# Patient Record
Sex: FEMALE | Race: ASIAN | NOT HISPANIC OR LATINO | Employment: FULL TIME | URBAN - METROPOLITAN AREA
[De-identification: names, ages, dates, MRNs, and addresses within clinical notes are randomized per-mention and may not be internally consistent; named-entity substitution may affect disease eponyms.]

---

## 2017-08-01 LAB — PAP SMEAR - HIM PATIENT REPORTED: NEGATIVE

## 2017-08-22 ENCOUNTER — TRANSFERRED RECORDS (OUTPATIENT)
Dept: HEALTH INFORMATION MANAGEMENT | Facility: CLINIC | Age: 42
End: 2017-08-22

## 2017-08-28 ENCOUNTER — TRANSFERRED RECORDS (OUTPATIENT)
Dept: HEALTH INFORMATION MANAGEMENT | Facility: CLINIC | Age: 42
End: 2017-08-28

## 2018-04-26 ENCOUNTER — TRANSFERRED RECORDS (OUTPATIENT)
Dept: HEALTH INFORMATION MANAGEMENT | Facility: CLINIC | Age: 43
End: 2018-04-26

## 2019-07-31 ENCOUNTER — OFFICE VISIT (OUTPATIENT)
Dept: FAMILY MEDICINE | Facility: CLINIC | Age: 44
End: 2019-07-31
Payer: COMMERCIAL

## 2019-07-31 VITALS
RESPIRATION RATE: 14 BRPM | BODY MASS INDEX: 20.59 KG/M2 | OXYGEN SATURATION: 100 % | TEMPERATURE: 97.4 F | HEIGHT: 64 IN | DIASTOLIC BLOOD PRESSURE: 90 MMHG | SYSTOLIC BLOOD PRESSURE: 158 MMHG | WEIGHT: 120.6 LBS | HEART RATE: 87 BPM

## 2019-07-31 DIAGNOSIS — N83.202 CYST OF LEFT OVARY: ICD-10-CM

## 2019-07-31 DIAGNOSIS — Z00.00 ROUTINE GENERAL MEDICAL EXAMINATION AT A HEALTH CARE FACILITY: Primary | ICD-10-CM

## 2019-07-31 DIAGNOSIS — B35.1 ONYCHOMYCOSIS: ICD-10-CM

## 2019-07-31 DIAGNOSIS — I10 BENIGN ESSENTIAL HYPERTENSION: ICD-10-CM

## 2019-07-31 DIAGNOSIS — Z80.3 FAMILY HISTORY OF BREAST CANCER IN FIRST DEGREE RELATIVE: ICD-10-CM

## 2019-07-31 LAB
ALBUMIN SERPL-MCNC: 3.6 G/DL (ref 3.4–5)
ALP SERPL-CCNC: 54 U/L (ref 40–150)
ALT SERPL W P-5'-P-CCNC: 16 U/L (ref 0–50)
ANION GAP SERPL CALCULATED.3IONS-SCNC: 10 MMOL/L (ref 3–14)
AST SERPL W P-5'-P-CCNC: 9 U/L (ref 0–45)
BASOPHILS # BLD AUTO: 0 10E9/L (ref 0–0.2)
BASOPHILS NFR BLD AUTO: 0.3 %
BILIRUB SERPL-MCNC: 0.5 MG/DL (ref 0.2–1.3)
BUN SERPL-MCNC: 10 MG/DL (ref 7–30)
CALCIUM SERPL-MCNC: 8.5 MG/DL (ref 8.5–10.1)
CHLORIDE SERPL-SCNC: 107 MMOL/L (ref 94–109)
CHOLEST SERPL-MCNC: 191 MG/DL
CO2 SERPL-SCNC: 25 MMOL/L (ref 20–32)
CREAT SERPL-MCNC: 0.5 MG/DL (ref 0.52–1.04)
DIFFERENTIAL METHOD BLD: NORMAL
EOSINOPHIL # BLD AUTO: 0.1 10E9/L (ref 0–0.7)
EOSINOPHIL NFR BLD AUTO: 0.8 %
ERYTHROCYTE [DISTWIDTH] IN BLOOD BY AUTOMATED COUNT: 13.2 % (ref 10–15)
GFR SERPL CREATININE-BSD FRML MDRD: >90 ML/MIN/{1.73_M2}
GLUCOSE SERPL-MCNC: 92 MG/DL (ref 70–99)
HCT VFR BLD AUTO: 38.8 % (ref 35–47)
HDLC SERPL-MCNC: 62 MG/DL
HGB BLD-MCNC: 12.7 G/DL (ref 11.7–15.7)
LDLC SERPL CALC-MCNC: 106 MG/DL
LYMPHOCYTES # BLD AUTO: 2.1 10E9/L (ref 0.8–5.3)
LYMPHOCYTES NFR BLD AUTO: 29.9 %
MCH RBC QN AUTO: 30.7 PG (ref 26.5–33)
MCHC RBC AUTO-ENTMCNC: 32.7 G/DL (ref 31.5–36.5)
MCV RBC AUTO: 94 FL (ref 78–100)
MONOCYTES # BLD AUTO: 0.6 10E9/L (ref 0–1.3)
MONOCYTES NFR BLD AUTO: 8 %
NEUTROPHILS # BLD AUTO: 4.3 10E9/L (ref 1.6–8.3)
NEUTROPHILS NFR BLD AUTO: 61 %
NONHDLC SERPL-MCNC: 129 MG/DL
PLATELET # BLD AUTO: 322 10E9/L (ref 150–450)
POTASSIUM SERPL-SCNC: 4.2 MMOL/L (ref 3.4–5.3)
PROT SERPL-MCNC: 7.7 G/DL (ref 6.8–8.8)
RBC # BLD AUTO: 4.14 10E12/L (ref 3.8–5.2)
SODIUM SERPL-SCNC: 142 MMOL/L (ref 133–144)
TRIGL SERPL-MCNC: 117 MG/DL
TSH SERPL DL<=0.005 MIU/L-ACNC: 1.56 MU/L (ref 0.4–4)
WBC # BLD AUTO: 7.1 10E9/L (ref 4–11)

## 2019-07-31 PROCEDURE — 99386 PREV VISIT NEW AGE 40-64: CPT | Performed by: NURSE PRACTITIONER

## 2019-07-31 PROCEDURE — 80053 COMPREHEN METABOLIC PANEL: CPT | Performed by: NURSE PRACTITIONER

## 2019-07-31 PROCEDURE — 84443 ASSAY THYROID STIM HORMONE: CPT | Performed by: NURSE PRACTITIONER

## 2019-07-31 PROCEDURE — 85025 COMPLETE CBC W/AUTO DIFF WBC: CPT | Performed by: NURSE PRACTITIONER

## 2019-07-31 PROCEDURE — 80061 LIPID PANEL: CPT | Performed by: NURSE PRACTITIONER

## 2019-07-31 PROCEDURE — 36415 COLL VENOUS BLD VENIPUNCTURE: CPT | Performed by: NURSE PRACTITIONER

## 2019-07-31 RX ORDER — CICLOPIROX 80 MG/ML
SOLUTION TOPICAL
Qty: 6 ML | Refills: 6 | Status: SHIPPED | OUTPATIENT
Start: 2019-07-31 | End: 2020-07-17

## 2019-07-31 RX ORDER — LISINOPRIL 5 MG/1
5 TABLET ORAL DAILY
Qty: 30 TABLET | Refills: 0 | Status: SHIPPED | OUTPATIENT
Start: 2019-07-31 | End: 2019-08-29

## 2019-07-31 SDOH — HEALTH STABILITY: MENTAL HEALTH: HOW OFTEN DO YOU HAVE A DRINK CONTAINING ALCOHOL?: NEVER

## 2019-07-31 ASSESSMENT — ENCOUNTER SYMPTOMS
PALPITATIONS: 0
BREAST MASS: 0
HEARTBURN: 0
CONSTIPATION: 0
PARESTHESIAS: 0
COUGH: 0
DYSURIA: 0
NAUSEA: 0
DIARRHEA: 0
ABDOMINAL PAIN: 0
HEMATURIA: 0
SHORTNESS OF BREATH: 0
NERVOUS/ANXIOUS: 0
MYALGIAS: 0
CHILLS: 0
ARTHRALGIAS: 0
HEMATOCHEZIA: 0
HEADACHES: 0
JOINT SWELLING: 0
FREQUENCY: 0
WEAKNESS: 0
DIZZINESS: 0
EYE PAIN: 0
SORE THROAT: 0
FEVER: 0

## 2019-07-31 ASSESSMENT — MIFFLIN-ST. JEOR: SCORE: 1193.07

## 2019-07-31 NOTE — LETTER
August 2, 2019      Piyush Feldman  [address] [address] [facility] [address]               Hi Piyush,   Your lab results are as below:   1)  TSH (thyroid level) 1.56 which is normal (range 0.4-4)   2)  Cholesterol is normal at 191, your LDL (bad cholesterol) and your HDL (good cholesterol) are also  within normal range. Continue to follow a low cholesterol diet and we will recheck this in 1 year.   3)  Glucose is normal at 92 (normal fasting is <100).   4)  CBC (checks for anemia and infection) is normal.          Resulted Orders   CBC with platelets differential   Result Value Ref Range    WBC 7.1 4.0 - 11.0 10e9/L    RBC Count 4.14 3.8 - 5.2 10e12/L    Hemoglobin 12.7 11.7 - 15.7 g/dL    Hematocrit 38.8 35.0 - 47.0 %    MCV 94 78 - 100 fl    MCH 30.7 26.5 - 33.0 pg    MCHC 32.7 31.5 - 36.5 g/dL    RDW 13.2 10.0 - 15.0 %    Platelet Count 322 150 - 450 10e9/L    % Neutrophils 61.0 %    % Lymphocytes 29.9 %    % Monocytes 8.0 %    % Eosinophils 0.8 %    % Basophils 0.3 %    Absolute Neutrophil 4.3 1.6 - 8.3 10e9/L    Absolute Lymphocytes 2.1 0.8 - 5.3 10e9/L    Absolute Monocytes 0.6 0.0 - 1.3 10e9/L    Absolute Eosinophils 0.1 0.0 - 0.7 10e9/L    Absolute Basophils 0.0 0.0 - 0.2 10e9/L    Diff Method Automated Method    Comprehensive metabolic panel   Result Value Ref Range    Sodium 142 133 - 144 mmol/L    Potassium 4.2 3.4 - 5.3 mmol/L    Chloride 107 94 - 109 mmol/L    Carbon Dioxide 25 20 - 32 mmol/L    Anion Gap 10 3 - 14 mmol/L    Glucose 92 70 - 99 mg/dL    Urea Nitrogen 10 7 - 30 mg/dL    Creatinine 0.50 (L) 0.52 - 1.04 mg/dL    GFR Estimate >90 >60 mL/min/[1.73_m2]      Comment:      Non  GFR Calc  Starting 12/18/2018, serum creatinine based estimated GFR (eGFR) will be   calculated using the Chronic Kidney Disease Epidemiology Collaboration   (CKD-EPI) equation.      GFR Estimate If Black >90 >60 mL/min/[1.73_m2]      Comment:       GFR Calc  Starting 12/18/2018, serum  creatinine based estimated GFR (eGFR) will be   calculated using the Chronic Kidney Disease Epidemiology Collaboration   (CKD-EPI) equation.      Calcium 8.5 8.5 - 10.1 mg/dL    Bilirubin Total 0.5 0.2 - 1.3 mg/dL    Albumin 3.6 3.4 - 5.0 g/dL    Protein Total 7.7 6.8 - 8.8 g/dL    Alkaline Phosphatase 54 40 - 150 U/L    ALT 16 0 - 50 U/L    AST 9 0 - 45 U/L   Lipid panel reflex to direct LDL Fasting   Result Value Ref Range    Cholesterol 191 <200 mg/dL    Triglycerides 117 <150 mg/dL    HDL Cholesterol 62 >49 mg/dL    LDL Cholesterol Calculated 106 (H) <100 mg/dL      Comment:      Above desirable:  100-129 mg/dl  Borderline High:  130-159 mg/dL  High:             160-189 mg/dL  Very high:       >189 mg/dl      Non HDL Cholesterol 129 <130 mg/dL   TSH with free T4 reflex   Result Value Ref Range    TSH 1.56 0.40 - 4.00 mU/L       If you have any questions or concerns, please call the clinic at the number listed above.       Sincerely,        Susan Haase, APRN CNP

## 2019-07-31 NOTE — PROGRESS NOTES
SUBJECTIVE:   CC: Piyush Feldman is an 43 year old woman who presents for preventive health visit.     Healthy Habits:     Getting at least 3 servings of Calcium per day:  Yes    Bi-annual eye exam:  Yes    Dental care twice a year:  Yes    Sleep apnea or symptoms of sleep apnea:  None    Diet:  Regular (no restrictions)    Frequency of exercise:  1 day/week    Duration of exercise:  15-30 minutes    Taking medications regularly:  Yes    Medication side effects:  None    PHQ-2 Total Score: 0    Additional concerns today:  Yes  1. Hypertension: has been elevated in the past, has never been on BP medication. Mother with history of HTN and stroke.  Denies headache, vision disturbance, chest pain.    2. Menses:  Every month, dysmenorrhea, taking aleve with slight improvement.    3.  Ovarian cyst:  Left ovarian cyst removed in ,last US 1 year ago with enlarged cyst, takes  Qlaira OCP for left ovarian cyst control.  Would like to establish care with a GYN.   4.  Cervical cancer screening:  Last pap in  2017, NIL  5. Mammogram:  Sister  of breast cancer at age 46, her last mammogram completed in 2017.   Social: moved to the  form the [facility] in the last year. Is an RN at [employer].   , 1 daughter age 16.      Today's PHQ-2 Score:   PHQ-2 (  [employer]) 2019   Q1: Little interest or pleasure in doing things 0   Q2: Feeling down, depressed or hopeless 0   PHQ-2 Score 0   Q1: Little interest or pleasure in doing things Not at all   Q2: Feeling down, depressed or hopeless Not at all   PHQ-2 Score 0       Abuse: Current or Past(Physical, Sexual or Emotional)- No  Do you feel safe in your environment? YES    Social History     Tobacco Use     Smoking status: Never Smoker     Smokeless tobacco: Never Used   Substance Use Topics     Alcohol use: Never     Frequency: Never         Alcohol Use 2019   Prescreen: >3 drinks/day or >7 drinks/week? Not Applicable       Reviewed orders with patient.   Reviewed health maintenance and updated orders accordingly - Yes  BP Readings from Last 3 Encounters:   07/31/19 (!) 176/91    Wt Readings from Last 3 Encounters:   07/31/19 54.7 kg (120 lb 9.6 oz)                  There is no problem list on file for this patient.    No past surgical history on file.    Social History     Tobacco Use     Smoking status: Never Smoker     Smokeless tobacco: Never Used   Substance Use Topics     Alcohol use: Never     Frequency: Never     No family history on file.      Current Outpatient Medications   Medication Sig Dispense Refill     ciclopirox (PENLAC) 8 % external solution Apply to adjacent skin and affected nails daily.  Remove with alcohol every 7 days, then repeat. 6 mL 6     lisinopril (PRINIVIL/ZESTRIL) 5 MG tablet Take 1 tablet (5 mg) by mouth daily 30 tablet 0     No Known Allergies    Mammogram Screening: Patient under age 50, mutual decision reflected in health maintenance.      Pertinent mammograms are reviewed under the imaging tab.  History of abnormal Pap smear: NO - age 30- 65 PAP every 3 years recommended     Reviewed and updated as needed this visit by clinical staff  Tobacco  Allergies  Soc Hx        Reviewed and updated as needed this visit by Provider            Review of Systems   Constitutional: Negative for chills and fever.   HENT: Negative for congestion, ear pain, hearing loss and sore throat.    Eyes: Negative for pain and visual disturbance.   Respiratory: Negative for cough and shortness of breath.    Cardiovascular: Negative for chest pain, palpitations and peripheral edema.   Gastrointestinal: Negative for abdominal pain, constipation, diarrhea, heartburn, hematochezia and nausea.   Breasts:  Negative for tenderness, breast mass and discharge.   Genitourinary: Negative for dysuria, frequency, genital sores, hematuria, pelvic pain, urgency, vaginal bleeding and vaginal discharge.   Musculoskeletal: Negative for arthralgias, joint swelling and  "myalgias.   Skin: Negative for rash.   Neurological: Negative for dizziness, weakness, headaches and paresthesias.   Psychiatric/Behavioral: Negative for mood changes. The patient is not nervous/anxious.       OBJECTIVE:   BP (!) 158/90   Pulse 87   Temp 97.4  F (36.3  C) (Oral)   Resp 14   Ht 1.619 m (5' 3.75\")   Wt 54.7 kg (120 lb 9.6 oz)   LMP 06/29/2019 (Exact Date)   SpO2 100%   BMI 20.86 kg/m    Physical Exam  GENERAL: healthy, alert and no distress  EYES: Eyes grossly normal to inspection, PERRL and conjunctivae and sclerae normal  HENT: ear canals and TM's normal, nose and mouth without ulcers or lesions  NECK: no adenopathy, no asymmetry, masses, or scars and thyroid normal to palpation  RESP: lungs clear to auscultation - no rales, rhonchi or wheezes  BREAST: normal without masses, tenderness or nipple discharge and no palpable axillary masses or adenopathy  CV: regular rate and rhythm, normal S1 S2, no S3 or S4, no murmur, click or rub, no peripheral edema and peripheral pulses strong  ABDOMEN: soft, nontender, no hepatosplenomegaly, no masses and bowel sounds normal  MS: no gross musculoskeletal defects noted, no edema  SKIN: no suspicious lesions or rashes  NEURO: Normal strength and tone, mentation intact and speech normal  PSYCH: mentation appears normal, affect normal/bright        ASSESSMENT/PLAN:   Piyush was seen today for physical.    Diagnoses and all orders for this visit:    Routine general medical examination at a health care facility  -     CBC with platelets differential  -     Comprehensive metabolic panel  -     Lipid panel reflex to direct LDL Fasting  -     TSH with free T4 reflex    Benign essential hypertension  -     lisinopril (PRINIVIL/ZESTRIL) 5 MG tablet; Take 1 tablet (5 mg) by mouth daily    Cyst of left ovary:  Set up an appointment with GYN for further evaluation, past US result from 2018 scanned into chart.     Family history of breast cancer in first degree " "relative:  sister diagnosed and  of breast cancer at age 46, no genetic testing completed. Another sister also diagnosed with breast cancer and had bilateral mastectomy.     Onychomycosis  -     ciclopirox (PENLAC) 8 % external solution; Apply to adjacent skin and affected nails daily.  Remove with alcohol every 7 days, then repeat.    COUNSELING:  Reviewed preventive health counseling, as reflected in patient instructions       Regular exercise       Healthy diet/nutrition    Estimated body mass index is 20.86 kg/m  as calculated from the following:    Height as of this encounter: 1.619 m (5' 3.75\").    Weight as of this encounter: 54.7 kg (120 lb 9.6 oz).         reports that she has never smoked. She has never used smokeless tobacco.      Counseling Resources:  ATP IV Guidelines  Pooled Cohorts Equation Calculator  Breast Cancer Risk Calculator  FRAX Risk Assessment  ICSI Preventive Guidelines  Dietary Guidelines for Americans, 2010  USDA's MyPlate  ASA Prophylaxis  Lung CA Screening  Follow up in 4 weeks, sooner as needed.   Susan Haase, APRN [facility]"

## 2019-08-12 ENCOUNTER — ALLIED HEALTH/NURSE VISIT (OUTPATIENT)
Dept: NURSING | Facility: CLINIC | Age: 44
End: 2019-08-12
Payer: COMMERCIAL

## 2019-08-12 VITALS — SYSTOLIC BLOOD PRESSURE: 138 MMHG | DIASTOLIC BLOOD PRESSURE: 78 MMHG

## 2019-08-12 DIAGNOSIS — Z80.3 FAMILY HISTORY OF BREAST CANCER IN FIRST DEGREE RELATIVE: Primary | ICD-10-CM

## 2019-08-12 DIAGNOSIS — Z01.30 BP CHECK: Primary | ICD-10-CM

## 2019-08-12 PROCEDURE — 99207 ZZC NO CHARGE NURSE ONLY: CPT

## 2019-08-12 NOTE — PROGRESS NOTES
Piyush Feldman is a 43 year old patient who comes in today for a Blood Pressure check.  Initial BP:  BP (!) 140/91 (BP Location: Right arm, Patient Position: Chair, Cuff Size: Adult Regular)    Second time manual bp was 138/78 okay per Megan RUEDA. Notified pt to make a  follow up appointment.  Data Unavailable  Disposition: Carol Iverson

## 2019-08-13 ENCOUNTER — TELEPHONE (OUTPATIENT)
Dept: ONCOLOGY | Facility: CLINIC | Age: 44
End: 2019-08-13

## 2019-08-13 NOTE — TELEPHONE ENCOUNTER
ONCOLOGY INTAKE: Records Information      APPT INFORMATION:  Referring provider:  Susan Haase  Referring provider s clinic:  [facility]  Reason for visit/diagnosis:  Family history of breast cancer in first degree relative [Z80.3]  Has patient been notified of appointment date and time?: NA    RECORDS INFORMATION:  Were the records received with the referral (via [employer])? Internal Referral    ADDITIONAL INFORMATION:  Left VM with hours and phone. Will try again on [id number] if Pt has not been scheduled. Referral in [facility].

## 2019-08-29 DIAGNOSIS — I10 BENIGN ESSENTIAL HYPERTENSION: ICD-10-CM

## 2019-08-30 RX ORDER — LISINOPRIL 5 MG/1
TABLET ORAL
Qty: 30 TABLET | Refills: 0 | Status: SHIPPED | OUTPATIENT
Start: 2019-08-30 | End: 2019-10-01

## 2019-08-30 NOTE — TELEPHONE ENCOUNTER
Next 5 appointments (look out 90 days)    Sep 03, 2019  9:55 AM CDT  Office Visit with Susan Rachele Haase, APRN CNP, CR EXAM ROOM 11  [facility] [address] [place] [id number] [phone]   Sep 20, 2019  2:00 PM CDT  Office Visit with Janice Galvez MD  [facility] [address] [place] [id number] [phone]

## 2019-09-20 ENCOUNTER — OFFICE VISIT (OUTPATIENT)
Dept: OBGYN | Facility: CLINIC | Age: 44
End: 2019-09-20
Payer: COMMERCIAL

## 2019-09-20 VITALS
HEART RATE: 103 BPM | DIASTOLIC BLOOD PRESSURE: 89 MMHG | SYSTOLIC BLOOD PRESSURE: 146 MMHG | HEIGHT: 64 IN | RESPIRATION RATE: 12 BRPM | BODY MASS INDEX: 20.66 KG/M2 | WEIGHT: 121 LBS

## 2019-09-20 DIAGNOSIS — N83.202 LEFT OVARIAN CYST: ICD-10-CM

## 2019-09-20 DIAGNOSIS — Z00.00 ANNUAL PHYSICAL EXAM: ICD-10-CM

## 2019-09-20 DIAGNOSIS — Z87.42 HISTORY OF ENDOMETRIOSIS: Primary | ICD-10-CM

## 2019-09-20 PROCEDURE — 99213 OFFICE O/P EST LOW 20 MIN: CPT | Mod: 25 | Performed by: OBSTETRICS & GYNECOLOGY

## 2019-09-20 PROCEDURE — 99386 PREV VISIT NEW AGE 40-64: CPT | Performed by: OBSTETRICS & GYNECOLOGY

## 2019-09-20 RX ORDER — ACETAMINOPHEN AND CODEINE PHOSPHATE 120; 12 MG/5ML; MG/5ML
0.35 SOLUTION ORAL DAILY
Qty: 84 TABLET | Refills: 3 | Status: SHIPPED | OUTPATIENT
Start: 2019-09-20 | End: 2019-12-16

## 2019-09-20 ASSESSMENT — MIFFLIN-ST. JEOR: SCORE: 1194.88

## 2019-09-20 NOTE — PROGRESS NOTES
SUBJECTIVE:                                                      Piyush is a 43 year old  female who presents for annual exam.     Patient's last menstrual period was 2019 (exact date).. Menses are regular q 28-30 days and crampy lasting 5-6 days.  Using oral contraceptives for contraception.  She is not currently considering pregnancy.  Besides routine health maintenance,  she would like to discuss family history breast cancer, personal history of endometriosis, and possibly planning pregnancy for next year.  She is status post left ovarian cystectomy for endometriosis in 2016. Follow up US in 2018 showed a complex left ovarian cyst again suspicious for possible endometrioma. This is from an ultrasound report from the [facility], which she brought with her today.  GYNECOLOGIC HISTORY:    Piyush is sexually active with 1 male partner(s) and is currently in a monogamous relationship. This is her second marriage, and they have no children together. She has a 17 year old daughter from a prior marriage.     History sexually transmitted infections:No STD history    History of abnormal Pap smear: NO - age 30-65 PAP every 5 years with negative HPV co-testing recommended  Family history of breast CA: Yes (Please explain): sister, age 46, . Referral for high risk cancer clinic is pending already.  Family history of uterine/ovarian CA: No    Family history of colon CA: No      HISTORY:  OB History    Para Term  AB Living   1 1 1 0 0 1   SAB TAB Ectopic Multiple Live Births   0 0 0 0 1      # Outcome Date GA Lbr Ronnell/2nd Weight Sex Delivery Anes PTL Lv   1 Term 02    F    MENA     History reviewed. No pertinent past medical history.  Past Surgical History:   Procedure Laterality Date     AS REMOVAL OF OVARIAN CYST(S) Left      Family History   Problem Relation Age of Onset     Breast Cancer Sister 46     Social History     Socioeconomic History     Marital status:       Spouse name: None     Number of children: None     Years of education: None     Highest education level: None   Occupational History     None   Social Needs     Financial resource strain: None     Food insecurity:     Worry: None     Inability: None     Transportation needs:     Medical: None     Non-medical: None   Tobacco Use     Smoking status: Never Smoker     Smokeless tobacco: Never Used   Substance and Sexual Activity     Alcohol use: Never     Frequency: Never     Drug use: Never     Sexual activity: Yes     Partners: Male     Birth control/protection: None   Lifestyle     Physical activity:     Days per week: None     Minutes per session: None     Stress: None   Relationships     Social connections:     Talks on phone: None     Gets together: None     Attends Protestant service: None     Active member of club or organization: None     Attends meetings of clubs or organizations: None     Relationship status: None     Intimate partner violence:     Fear of current or ex partner: None     Emotionally abused: None     Physically abused: None     Forced sexual activity: None   Other Topics Concern     None   Social History Narrative     None       Current Outpatient Medications:      ciclopirox (PENLAC) 8 % external solution, Apply to adjacent skin and affected nails daily.  Remove with alcohol every 7 days, then repeat., Disp: 6 mL, Rfl: 6     lisinopril (PRINIVIL/ZESTRIL) 5 MG tablet, TAKE ONE TABLET BY MOUTH ONCE DAILY, Disp: 30 tablet, Rfl: 0     norethindrone (MICRONOR) 0.35 MG tablet, Take 1 tablet (0.35 mg) by mouth daily, Disp: 84 tablet, Rfl: 3   No Known Allergies    Past medical, surgical, social and family history were reviewed and updated in EPIC.    ROS:   12 point review of systems negative other than symptoms noted below.      OBJECTIVE:                                                      EXAM:  BP (!) 146/89 (BP Location: Right arm, Cuff Size: Adult Regular)   Pulse 103   Resp 12   Ht 1.619 m  "(5' 3.75\")   Wt 54.9 kg (121 lb)   LMP 09/02/2019 (Exact Date)   Breastfeeding? No   BMI 20.93 kg/m     BMI: Body mass index is 20.93 kg/m .  General: Alert and oriented, no distress.  Psychiatric: Mood and affect within normal limits.  Skin: Warm and dry, no lesions, rashes or discolorations.  Neck: Neck supple. Thyroid palpbably normal in size and without nodularity.  Cardiovascular: Regular rate and rhythm, no murmurs, rubs or gallops.   Lungs:  Clear to auscultation bilaterally, breathing is unlabored.  Breasts:  Symmetric, no skin changes.  No dominant masses bilaterally.   Lymph:  No cervical, supraclavicular, infraclavicular, axillary or inguinal lymphadenopathy palpable.   Abdomen: Soft, nontender, no hepatosplenomegaly, no rebound or guarding, no masses, no hernias.   Vulva:  No external lesions, normal female hair distribution, no inguinal adenopathy.    Urethra:  Midline, non-tender, well supported, no discharge  Vagina:  Well-estrogenized, no abnormal discharge, no lesions  Cervix: no lesions, no discharge  Uterus:  retroverted, smooth contour, without enlargement, mobile, without tenderness  Ovaries:  No masses appreciated, non-tender, mobile  Rectal Exam: deferred  Musculoskeletal: extremities normal      COUNSELING:   Reviewed preventive health counseling, as reflected in patient instructions       Regular exercise       Healthy diet/nutrition       Family planning   reports that she has never smoked. She has never used smokeless tobacco.        ASSESSMENT/PLAN:                                                      43 year old female with satisfactory annual exam  (Z87.42) History of endometriosis  (primary encounter diagnosis)  Comment:   Plan: US Pelvic Complete with Transvaginal,         norethindrone (MICRONOR) 0.35 MG tablet        Repeat US. Discussed the impact on fertility, potentially, which may be more significant given her age. She was using combination oral contraceptive pills for " suppression and birth control until 1 month ago. Given her hypertension and use of lisinopril to control this, I prefer progesterone only pills, and she should follow up with her primary care provider regarding coming off lisinopril before she would attempt to conceive. AMA risks discussed, fertility problems with older age discussed. Discussed the differences between the progesterone-only pill and the combination oral contraceptive pills. Specifically, discussed taking this at the same time every day (within a 3 hour window), using back up contraception for 2-4 weeks after starting, using back up contraception for one week if she misses or is late taking a pill, and that there are no placebo pills in the pack. She states her understanding.      (N83.202) Left ovarian cyst  Comment:   Plan: US Pelvic Complete with Transvaginal            (Z00.00) Annual physical exam  Comment:   Plan: MA Screen Bilateral w/Roger, norethindrone         (MICRONOR) 0.35 MG tablet              Janice Galvez MD

## 2019-09-26 ENCOUNTER — ANCILLARY PROCEDURE (OUTPATIENT)
Dept: MAMMOGRAPHY | Facility: CLINIC | Age: 44
End: 2019-09-26
Payer: COMMERCIAL

## 2019-09-26 DIAGNOSIS — Z00.00 ANNUAL PHYSICAL EXAM: ICD-10-CM

## 2019-09-26 PROCEDURE — 77063 BREAST TOMOSYNTHESIS BI: CPT | Mod: TC

## 2019-09-26 PROCEDURE — 77067 SCR MAMMO BI INCL CAD: CPT | Mod: TC

## 2019-10-01 DIAGNOSIS — I10 BENIGN ESSENTIAL HYPERTENSION: ICD-10-CM

## 2019-10-01 RX ORDER — LISINOPRIL 5 MG/1
TABLET ORAL
Qty: 10 TABLET | Refills: 0 | Status: SHIPPED | OUTPATIENT
Start: 2019-10-01 | End: 2019-10-09 | Stop reason: ALTCHOICE

## 2019-10-01 NOTE — TELEPHONE ENCOUNTER
"Last Written Prescription Date:  8/30/19  Last Fill Quantity: 30 tablet,  # refills: 0   Last office visit: 7/31/2019 with prescribing provider:  Haase   Future Office Visit:   Next 5 appointments (look out 90 days)    Oct 09, 2019 10:35 AM CDT  Office Visit with Susan Rachele Haase, ROBEL CNP, CR EXAM ROOM 02  [facility] [address] [place] [id number] [phone]         Requested Prescriptions   Pending Prescriptions Disp Refills     lisinopril (PRINIVIL/ZESTRIL) 5 MG tablet [Pharmacy Med Name: LISINOPRIL 5MG TABS] 30 tablet 0     Sig: TAKE ONE TABLET BY MOUTH ONCE DAILY       ACE Inhibitors (Including Combos) Protocol Failed - 10/1/2019 10:02 AM        Failed - Blood pressure under 140/90 in past 12 months     BP Readings from Last 3 Encounters:   09/20/19 (!) 146/89   08/12/19 138/78   07/31/19 (!) 158/90                 Failed - Normal serum creatinine on file in past 12 months     Recent Labs   Lab Test 07/31/19  0943   CR 0.50*             Passed - Recent (12 mo) or future (30 days) visit within the authorizing provider's specialty     Patient has had an office visit with the authorizing provider or a provider within the authorizing providers department within the previous 12 mos or has a future within next 30 days. See \"Patient Info\" tab in inbasket, or \"Choose Columns\" in Meds & Orders section of the refill encounter.              Passed - Medication is active on med list        Passed - Patient is age 18 or older        Passed - No active pregnancy on record        Passed - Normal serum potassium on file in past 12 months     Recent Labs   Lab Test 07/31/19  0943   POTASSIUM 4.2             Passed - No positive pregnancy test within past 12 months          "

## 2019-10-01 NOTE — TELEPHONE ENCOUNTER
[place] refill given per RN protocol.   Patient has an appt scheduled for: 10/9/2019  Kiana Sandy, RN, BSN

## 2019-10-09 ENCOUNTER — OFFICE VISIT (OUTPATIENT)
Dept: FAMILY MEDICINE | Facility: CLINIC | Age: 44
End: 2019-10-09
Payer: COMMERCIAL

## 2019-10-09 VITALS
HEIGHT: 64 IN | HEART RATE: 99 BPM | RESPIRATION RATE: 20 BRPM | TEMPERATURE: 98.6 F | DIASTOLIC BLOOD PRESSURE: 83 MMHG | OXYGEN SATURATION: 100 % | SYSTOLIC BLOOD PRESSURE: 134 MMHG | BODY MASS INDEX: 20.66 KG/M2 | WEIGHT: 121 LBS

## 2019-10-09 DIAGNOSIS — I10 BENIGN ESSENTIAL HYPERTENSION: ICD-10-CM

## 2019-10-09 PROCEDURE — 99213 OFFICE O/P EST LOW 20 MIN: CPT | Performed by: NURSE PRACTITIONER

## 2019-10-09 RX ORDER — NIFEDIPINE 30 MG/1
30 TABLET, EXTENDED RELEASE ORAL DAILY
Qty: 90 TABLET | Refills: 1 | Status: SHIPPED | OUTPATIENT
Start: 2019-10-09 | End: 2019-12-29

## 2019-10-09 RX ORDER — LISINOPRIL 5 MG/1
5 TABLET ORAL DAILY
Qty: 10 TABLET | Refills: 0 | Status: CANCELLED | OUTPATIENT
Start: 2019-10-09

## 2019-10-09 ASSESSMENT — MIFFLIN-ST. JEOR: SCORE: 1194.88

## 2019-10-09 NOTE — PROGRESS NOTES
"Subjective     Piyush Feldman is a 43 year old female who presents to clinic today for the following health issues:    History of Present Illness        Hypertension: She presents for follow up of hypertension.  She does check blood pressure  regularly outside of the clinic. Outpatient blood pressures have not been over 140/90. She does not follow a low salt diet.    Taking lisinopril 5 mg every day.  Is thinking of pregnancy in the future, is wondering about changing to a different BP medication.    OCP:  Started on progesterone only OCP      Current Outpatient Medications   Medication Sig Dispense Refill     NIFEdipine ER OSMOTIC (PROCARDIA XL) 30 MG 24 hr tablet Take 1 tablet (30 mg) by mouth daily 90 tablet 1     ciclopirox (PENLAC) 8 % external solution Apply to adjacent skin and affected nails daily.  Remove with alcohol every 7 days, then repeat. 6 mL 6     norethindrone (MICRONOR) 0.35 MG tablet Take 1 tablet (0.35 mg) by mouth daily 84 tablet 3     No Known Allergies  BP Readings from Last 3 Encounters:   10/09/19 134/83   09/20/19 (!) 146/89   08/12/19 138/78    Wt Readings from Last 3 Encounters:   10/09/19 54.9 kg (121 lb)   09/20/19 54.9 kg (121 lb)   07/31/19 54.7 kg (120 lb 9.6 oz)      Reviewed and updated as needed this visit by Provider         Review of Systems   ROS COMP: Constitutional, HEENT, cardiovascular, pulmonary, gi and gu systems are negative, except as otherwise noted.      Objective    /83 (BP Location: Right arm, Patient Position: Chair, Cuff Size: Adult Regular)   Pulse 99   Temp 98.6  F (37  C) (Oral)   Resp 20   Ht 1.619 m (5' 3.75\")   Wt 54.9 kg (121 lb)   SpO2 100%   BMI 20.93 kg/m    Body mass index is 20.93 kg/m .  Physical Exam   GENERAL: healthy, alert and no distress  NECK: no adenopathy, no asymmetry, masses, or scars and thyroid normal to palpation  RESP: lungs clear to auscultation - no rales, rhonchi or wheezes  CV: regular rate and rhythm, normal S1 S2, no " S3 or S4, no murmur, click or rub, no peripheral edema  PSYCH: mentation appears normal, affect normal/bright            Assessment & Plan   Assessment      Plan  1. Benign essential hypertension:  /83, well controlled.  Will be trying to get pregnant in the future, will discontinue lisinopril and start on nifedipine which is safe to take during pregnancy.  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     - NIFEdipine ER OSMOTIC (PROCARDIA XL) 30 MG 24 hr tablet; Take 1 tablet (30 mg) by mouth daily  Dispense: 90 tablet; Refill: 1    Return in about 6 months (around 4/9/2020) for Routine Visit.    Susan Haase, APRN [facility]

## 2019-10-15 PROCEDURE — 76856 US EXAM PELVIC COMPLETE: CPT

## 2019-10-15 PROCEDURE — 76830 TRANSVAGINAL US NON-OB: CPT

## 2019-11-01 ENCOUNTER — TELEPHONE (OUTPATIENT)
Dept: OBGYN | Facility: CLINIC | Age: 44
End: 2019-11-01

## 2019-11-01 NOTE — TELEPHONE ENCOUNTER
Pt is scheduled for Dec 17 to go over results from U/s.    She wishes to be seen sooner than this, however there are no spots.      I did go over her results with her briefly.  Let me know if she can be seen on a call day or if she will need to wait for the Dec appt.    Azalia CUNNINGHAM R.N.  [facility]

## 2019-11-05 NOTE — TELEPHONE ENCOUNTER
Baylee, can you please look at Dr. Rivera's afternoon schedule for Friday at [place], and open as requested by her if possible.   Also, can you call the pt and see if she is able to come in on that day?    Thanks.   Pepper

## 2019-11-05 NOTE — TELEPHONE ENCOUNTER
Please add her Friday afternoon--not sure why I am blocked out in the afternoon (Baylee will know, maybe), but unless there is a reason I don't know about/remember, I'd like to open the afternoon in  starting at 130. Please let her know that the appointment is there, not in [place] (I actually think that's where I saw her initially anyway.)    Janice Galvez MD

## 2019-11-06 NOTE — TELEPHONE ENCOUNTER
Patient returned the call and an appointment was scheduled for her with Dr. Galvez on 11/8/19 @ 3:30.

## 2019-11-08 ENCOUNTER — OFFICE VISIT (OUTPATIENT)
Dept: OBGYN | Facility: CLINIC | Age: 44
End: 2019-11-08
Payer: COMMERCIAL

## 2019-11-08 VITALS
WEIGHT: 121 LBS | HEIGHT: 64 IN | HEART RATE: 108 BPM | BODY MASS INDEX: 20.66 KG/M2 | DIASTOLIC BLOOD PRESSURE: 91 MMHG | SYSTOLIC BLOOD PRESSURE: 160 MMHG

## 2019-11-08 DIAGNOSIS — D25.1 INTRAMURAL AND SUBSEROUS LEIOMYOMA OF UTERUS: ICD-10-CM

## 2019-11-08 DIAGNOSIS — D25.2 INTRAMURAL AND SUBSEROUS LEIOMYOMA OF UTERUS: ICD-10-CM

## 2019-11-08 DIAGNOSIS — N80.129 ENDOMETRIOMA OF OVARY: Primary | ICD-10-CM

## 2019-11-08 PROCEDURE — 99214 OFFICE O/P EST MOD 30 MIN: CPT | Performed by: OBSTETRICS & GYNECOLOGY

## 2019-11-08 ASSESSMENT — MIFFLIN-ST. JEOR: SCORE: 1189.88

## 2019-11-08 NOTE — NURSING NOTE
"Chief Complaint   Patient presents with     RECHECK       Initial BP (!) 160/91   Pulse 108   Ht 1.619 m (5' 3.75\")   Wt 54.9 kg (121 lb)   LMP 10/29/2019   Breastfeeding? No   BMI 20.93 kg/m   Estimated body mass index is 20.93 kg/m  as calculated from the following:    Height as of this encounter: 1.619 m (5' 3.75\").    Weight as of this encounter: 54.9 kg (121 lb).  BP completed using cuff size: regular    Questioned patient about current smoking habits.  Pt. has never smoked.          The following HM Due: NONE      The following patient reported/Care Every where data was sent to:  P ABSTRACT QUALITY INITIATIVES [[id number]]  Rossy Sung LPN             "

## 2019-11-08 NOTE — PROGRESS NOTES
SUBJECTIVE:                                                   Piyush Feldman is a 44 year old female who presents to clinic today to follow up for ultrasound results. Please see my last note for complete details.    She has a history of endometriosis, and is status post laparoscopic left ovarian cyst removal in 2016.  She is currently asymptomatic, without significant pain.  She does note slightly heavier periods in the last 6 months or so.  She is still uncertain about plans for future childbearing.    Recent US:    ULTRASOUND - PELVIC GYN     Referring MD: Janice Galvez MD   Primary Clinic: [facility]     ===================================     CLINICAL INFORMATION     Indications for ultrasound: Hx - endometriosis and left ovarian cyst     LMP: 02 Oct 2019    Hormones: OCP     Measurements:  Uterus: 7.6 x 5.9 x 5.9 cm.     Position is retroverted.  Contour is irreg w myomata:   1) anterior 1.7 x 1.0 cm, 2) right 1.9 x 1.5 cm,   3) left 4.2 x 3.2 cm, others smaller.     Endo cav: 2.9 mm         Smooth/regular/wnl  Cervix: Wnl     Right ovary: 3.3 x 2.3 x 2.3 cm. Complex cyst 2.5 x 1.6 x 2.0cm  Left ovary: 5.2 x 4.3 x 4.4 cm. Complex cyst 4.3 x 4.0 x 3.4cm     Cul de sac: no free fluid     ===================================  Complete pelvic ultrasound using realtime   transabdominal and transvaginal scanning.  Bladder appears normal.     Uterine fibroids, not affecting the uterine cavity  Complex bilateral ovarian cyst(s)  Normal endometrial lining  No free fluid in the cul de sac     The right ovary contains a small complex cyst, likely of functional origin.  The left ovary contains a complex cyst consistent with possible endometrioma.      Problem list and histories reviewed & adjusted, as indicated.  Additional history: as documented.    Patient Active Problem List   Diagnosis     Cyst of left ovary     Family history of breast cancer in first degree relative     Benign essential hypertension     Past  Surgical History:   Procedure Laterality Date     AS REMOVAL OF OVARIAN CYST(S) Left 2016      Social History     Tobacco Use     Smoking status: Never Smoker     Smokeless tobacco: Never Used   Substance Use Topics     Alcohol use: Never     Frequency: Never      Problem (# of Occurrences) Relation (Name,Age of Onset)    Breast Cancer (1) Sister (46)            ciclopirox (PENLAC) 8 % external solution, Apply to adjacent skin and affected nails daily.  Remove with alcohol every 7 days, then repeat.  NIFEdipine ER OSMOTIC (PROCARDIA XL) 30 MG 24 hr tablet, Take 1 tablet (30 mg) by mouth daily  norethindrone (MICRONOR) 0.35 MG tablet, Take 1 tablet (0.35 mg) by mouth daily    No current facility-administered medications on file prior to visit.     No Known Allergies    ROS:  5 point ROS negative except as noted above in HPI, including Gen., Resp., CV, GI &  system review.    OBJECTIVE:     No exam was necessary today as this was entirely a counseling appointment.    In-Clinic Test Results:  No results found for this or any previous visit (from the past 24 hour(s)).    ASSESSMENT/PLAN:                                                        ICD-10-CM    1. Endometrioma of ovary N80.1    2. Intramural and subserous leiomyoma of uterus D25.1     D25.2          We reviewed the ultrasound results in detail today.  After long discussion about options for treatment of endometriosis and the natural history of uterine fibroids, she will consider for options:    1.  Because she is potentially interested in conceiving in the very near future, we could elect to manage this expectantly, with a repeat ultrasound in 4 to 6 months.  If there were any changes in size or complexity to the left ovarian lesion, I would recommend moving ahead with surgery at that time.  2.  Second option would be to think about removal of at least the largest fibroid, as that could be contributing to her increase in bleeding with periods over the last 6  months.  This can be combined with the removal of the left ovarian cyst.  3.  Third option, if she is certain that she has completed childbearing, would be laparoscopic hysterectomy with left ovarian cystectomy versus left nephrectomy.  4.  The last option would be laparoscopic evaluation with plans to simply remove the left ovarian cyst or if needed the left ovary and tube, with treatment of any any endometriosis seen at that point in time.    She would like to think over these options, will let me know what her plans are.  If she has any questions or concerns, she will contact me.    Janice Galvez MD  [facility]

## 2019-11-18 ENCOUNTER — TELEPHONE (OUTPATIENT)
Dept: OBGYN | Facility: CLINIC | Age: 44
End: 2019-11-18

## 2019-11-18 NOTE — TELEPHONE ENCOUNTER
Patient calling:  Started Micronor Oct 1 Finished 1 pack.  Started 2nd pack 10/28  Started period 10/28. Very light first few days,  Then heavy,  almost stopped 11/8 (had appt with you)  Bleeding started 11/9  Very light.  stopped 11/14-15 and now heavy last 2 days.  Advised pt to continue pill for now.  Concerned about continued bleeding    Please advise.    Ashli Oleary RN

## 2019-11-19 NOTE — TELEPHONE ENCOUNTER
Plans going forward really depend on whether she is planning to attempt conception (see the plan from my last note), or whether she is interested in any surgical evaluation and treatment. Otherwise, I would continue the micronor for now.    Janice Galvez MD

## 2019-11-20 NOTE — TELEPHONE ENCOUNTER
Patient advised of message below.  Bleeding is less now.  She will continue Micronor for now.  Ashli Oleary RN

## 2019-12-13 ENCOUNTER — TELEPHONE (OUTPATIENT)
Dept: FAMILY MEDICINE | Facility: CLINIC | Age: 44
End: 2019-12-13

## 2019-12-13 NOTE — TELEPHONE ENCOUNTER
Summary:    Patient is due/failing the following:   BP CHECK    Reviewed:  [x] CARE EVERYWHERE  [x] LAST OV NOTE INCLUDING ENDO  [x] FYI TAB  [x] LAST PANEL ENCOUNTER  [x] FUTURE APTS  Action needed:   Patient needs nurse only appointment.    Type of outreach:    Sent [employer] message.                                                                               Jess Malone MA

## 2019-12-16 ENCOUNTER — MYC REFILL (OUTPATIENT)
Dept: OBGYN | Facility: CLINIC | Age: 44
End: 2019-12-16

## 2019-12-16 DIAGNOSIS — Z87.42 HISTORY OF ENDOMETRIOSIS: ICD-10-CM

## 2019-12-16 DIAGNOSIS — Z00.00 ANNUAL PHYSICAL EXAM: ICD-10-CM

## 2019-12-16 RX ORDER — ACETAMINOPHEN AND CODEINE PHOSPHATE 120; 12 MG/5ML; MG/5ML
0.35 SOLUTION ORAL DAILY
Qty: 84 TABLET | Refills: 0 | Status: SHIPPED | OUTPATIENT
Start: 2019-12-16 | End: 2020-07-17

## 2019-12-24 ENCOUNTER — ALLIED HEALTH/NURSE VISIT (OUTPATIENT)
Dept: FAMILY MEDICINE | Facility: CLINIC | Age: 44
End: 2019-12-24
Payer: COMMERCIAL

## 2019-12-24 VITALS — DIASTOLIC BLOOD PRESSURE: 78 MMHG | SYSTOLIC BLOOD PRESSURE: 138 MMHG

## 2019-12-24 DIAGNOSIS — I10 BENIGN ESSENTIAL HYPERTENSION: Primary | ICD-10-CM

## 2019-12-24 PROCEDURE — 99207 ZZC NO CHARGE NURSE ONLY: CPT

## 2020-01-02 ENCOUNTER — MYC REFILL (OUTPATIENT)
Dept: FAMILY MEDICINE | Facility: CLINIC | Age: 45
End: 2020-01-02

## 2020-01-02 DIAGNOSIS — I10 BENIGN ESSENTIAL HYPERTENSION: ICD-10-CM

## 2020-01-02 RX ORDER — NIFEDIPINE 30 MG/1
30 TABLET, EXTENDED RELEASE ORAL DAILY
Qty: 90 TABLET | Refills: 1 | Status: CANCELLED | OUTPATIENT
Start: 2020-01-02

## 2020-01-03 NOTE — TELEPHONE ENCOUNTER
"Requested Prescriptions   Pending Prescriptions Disp Refills     NIFEdipine ER OSMOTIC (PROCARDIA XL) 30 MG 24 hr tablet   90 tablet 1     Sig: Take 1 tablet (30 mg) by mouth daily       Calcium Channel Blockers Protocol  Failed - 1/2/2020  3:50 PM        Failed - Normal serum creatinine on file in past 12 months     Recent Labs   Lab Test 07/31/19  0943   CR 0.50*             Passed - Blood pressure under 140/90 in past 12 months     BP Readings from Last 3 Encounters:   12/24/19 138/78   11/08/19 (!) 160/91   10/09/19 134/83                 Passed - Recent (12 mo) or future (30 days) visit within the authorizing provider's specialty     Patient has had an office visit with the authorizing provider or a provider within the authorizing providers department within the previous 12 mos or has a future within next 30 days. See \"Patient Info\" tab in inbasket, or \"Choose Columns\" in Meds & Orders section of the refill encounter.              Passed - Medication is active on med list        Passed - Patient is age 18 or older        Passed - No active pregnancy on record        Passed - No positive pregnancy test in past 12 months        Medication refused, refill requested too soon. Prescription ordered on 12/31/19 for 90 day supply with 1 additional refill.    "

## 2020-06-26 ENCOUNTER — MYC REFILL (OUTPATIENT)
Dept: FAMILY MEDICINE | Facility: CLINIC | Age: 45
End: 2020-06-26

## 2020-06-26 DIAGNOSIS — I10 BENIGN ESSENTIAL HYPERTENSION: ICD-10-CM

## 2020-06-26 RX ORDER — NIFEDIPINE 30 MG/1
30 TABLET, EXTENDED RELEASE ORAL DAILY
Qty: 90 TABLET | Refills: 0 | Status: SHIPPED | OUTPATIENT
Start: 2020-06-26 | End: 2020-07-17

## 2020-06-26 NOTE — TELEPHONE ENCOUNTER
Left message for patient to call clinic back regarding Alma Delia's message.    Lubna Barbie, CMA

## 2020-06-26 NOTE — TELEPHONE ENCOUNTER
Routing refill request to provider for review/approval because:  Labs out of range:  Creatinine    Creatinine   Date Value Ref Range Status   07/31/2019 0.50 (L) 0.52 - 1.04 mg/dL Final     Britt SAUNDERS RN, BSN

## 2020-06-26 NOTE — TELEPHONE ENCOUNTER
Please let Jaires know she is due for a physical exam 8/1, please ask her to set this up now, arrive fasting for labs, I sent in a 90 day refill on nifedipine, she will need an appt prior to further refills.  Thanks,  Susan Haase, CNP

## 2020-07-16 ASSESSMENT — ENCOUNTER SYMPTOMS
COUGH: 0
ARTHRALGIAS: 0
CHILLS: 0
NERVOUS/ANXIOUS: 0
CONSTIPATION: 0
ABDOMINAL PAIN: 0
JOINT SWELLING: 0
MYALGIAS: 0
DIARRHEA: 0
BREAST MASS: 0
DIZZINESS: 0
HEMATOCHEZIA: 0
HEMATURIA: 0
PARESTHESIAS: 0
FREQUENCY: 0
NAUSEA: 0
DYSURIA: 0
HEADACHES: 0
WEAKNESS: 0
EYE PAIN: 0
HEARTBURN: 0
PALPITATIONS: 0
FEVER: 0
SHORTNESS OF BREATH: 0
SORE THROAT: 0

## 2020-07-17 ENCOUNTER — OFFICE VISIT (OUTPATIENT)
Dept: FAMILY MEDICINE | Facility: CLINIC | Age: 45
End: 2020-07-17
Payer: COMMERCIAL

## 2020-07-17 VITALS
BODY MASS INDEX: 20.49 KG/M2 | SYSTOLIC BLOOD PRESSURE: 136 MMHG | HEIGHT: 64 IN | WEIGHT: 120 LBS | RESPIRATION RATE: 20 BRPM | TEMPERATURE: 98.7 F | OXYGEN SATURATION: 100 % | DIASTOLIC BLOOD PRESSURE: 84 MMHG | HEART RATE: 97 BPM

## 2020-07-17 DIAGNOSIS — I10 BENIGN ESSENTIAL HYPERTENSION: ICD-10-CM

## 2020-07-17 DIAGNOSIS — Z12.4 CERVICAL CANCER SCREENING: ICD-10-CM

## 2020-07-17 DIAGNOSIS — N92.1 MENORRHAGIA WITH IRREGULAR CYCLE: ICD-10-CM

## 2020-07-17 DIAGNOSIS — Z00.00 ROUTINE GENERAL MEDICAL EXAMINATION AT A HEALTH CARE FACILITY: Primary | ICD-10-CM

## 2020-07-17 LAB
BASOPHILS # BLD AUTO: 0 10E9/L (ref 0–0.2)
BASOPHILS NFR BLD AUTO: 0.3 %
DIFFERENTIAL METHOD BLD: ABNORMAL
EOSINOPHIL # BLD AUTO: 0.1 10E9/L (ref 0–0.7)
EOSINOPHIL NFR BLD AUTO: 0.8 %
ERYTHROCYTE [DISTWIDTH] IN BLOOD BY AUTOMATED COUNT: 15.1 % (ref 10–15)
HCT VFR BLD AUTO: 38.3 % (ref 35–47)
HGB BLD-MCNC: 12.1 G/DL (ref 11.7–15.7)
LYMPHOCYTES # BLD AUTO: 1.8 10E9/L (ref 0.8–5.3)
LYMPHOCYTES NFR BLD AUTO: 20.4 %
MCH RBC QN AUTO: 28.8 PG (ref 26.5–33)
MCHC RBC AUTO-ENTMCNC: 31.6 G/DL (ref 31.5–36.5)
MCV RBC AUTO: 91 FL (ref 78–100)
MONOCYTES # BLD AUTO: 0.7 10E9/L (ref 0–1.3)
MONOCYTES NFR BLD AUTO: 7.7 %
NEUTROPHILS # BLD AUTO: 6.2 10E9/L (ref 1.6–8.3)
NEUTROPHILS NFR BLD AUTO: 70.8 %
PLATELET # BLD AUTO: 353 10E9/L (ref 150–450)
RBC # BLD AUTO: 4.2 10E12/L (ref 3.8–5.2)
SPECIMEN SOURCE: NORMAL
WBC # BLD AUTO: 8.7 10E9/L (ref 4–11)
WET PREP SPEC: NORMAL

## 2020-07-17 PROCEDURE — 87624 HPV HI-RISK TYP POOLED RSLT: CPT | Performed by: NURSE PRACTITIONER

## 2020-07-17 PROCEDURE — 99396 PREV VISIT EST AGE 40-64: CPT | Performed by: NURSE PRACTITIONER

## 2020-07-17 PROCEDURE — 80050 GENERAL HEALTH PANEL: CPT | Performed by: NURSE PRACTITIONER

## 2020-07-17 PROCEDURE — 80061 LIPID PANEL: CPT | Performed by: NURSE PRACTITIONER

## 2020-07-17 PROCEDURE — 83550 IRON BINDING TEST: CPT | Performed by: NURSE PRACTITIONER

## 2020-07-17 PROCEDURE — G0145 SCR C/V CYTO,THINLAYER,RESCR: HCPCS | Performed by: NURSE PRACTITIONER

## 2020-07-17 PROCEDURE — 87210 SMEAR WET MOUNT SALINE/INK: CPT | Performed by: NURSE PRACTITIONER

## 2020-07-17 PROCEDURE — 83540 ASSAY OF IRON: CPT | Performed by: NURSE PRACTITIONER

## 2020-07-17 PROCEDURE — 36415 COLL VENOUS BLD VENIPUNCTURE: CPT | Performed by: NURSE PRACTITIONER

## 2020-07-17 RX ORDER — NIFEDIPINE 30 MG/1
30 TABLET, EXTENDED RELEASE ORAL DAILY
Qty: 90 TABLET | Refills: 3 | Status: SHIPPED | OUTPATIENT
Start: 2020-07-17 | End: 2021-07-26

## 2020-07-17 ASSESSMENT — ENCOUNTER SYMPTOMS
PARESTHESIAS: 0
CHILLS: 0
COUGH: 0
BREAST MASS: 0
DIZZINESS: 0
SHORTNESS OF BREATH: 0
HEMATURIA: 0
FREQUENCY: 0
EYE PAIN: 0
DIARRHEA: 0
NAUSEA: 0
FEVER: 0
ABDOMINAL PAIN: 0
MYALGIAS: 0
CONSTIPATION: 0
PALPITATIONS: 0
HEARTBURN: 0
ARTHRALGIAS: 0
HEADACHES: 0
HEMATOCHEZIA: 0
SORE THROAT: 0
NERVOUS/ANXIOUS: 0
JOINT SWELLING: 0
DYSURIA: 0
WEAKNESS: 0

## 2020-07-17 ASSESSMENT — MIFFLIN-ST. JEOR: SCORE: 1185.35

## 2020-07-17 NOTE — PROGRESS NOTES
SUBJECTIVE:   CC: Piyush Feldman is an 44 year old woman who presents for preventive health visit.     Healthy Habits:     Getting at least 3 servings of Calcium per day:  Yes    Bi-annual eye exam:  Yes    Dental care twice a year:  Yes    Sleep apnea or symptoms of sleep apnea:  None    Diet:  Regular (no restrictions)    Frequency of exercise:  None    Taking medications regularly:  Yes    Medication side effects:  Not applicable    PHQ-2 Total Score: 0    Additional concerns today:  Yes    Cervical cancer screenin2017,NIL   Breast cancer screening:  Last 2019, family history of breast cancer, her sister  of breast cancer at age 46, has not went for genetic testing.   Hypertension:  /84.  Taking nifedipine ER 30 mg every day. Checks BP outside of the clinic, is well controlled.  Menses:  Tried Micronor, periods lasting 3 weeks.   Is not on any type of birth control.  LMP  lasted 7-10 days, heavy bleeding with cramping with migraine.  Has been taking OTC iron supplement.     Social:  , 17 year old daughter, works as an RN in [employer]    Today's PHQ-2 Score:   PHQ-2 (  [employer]) 2020   Q1: Little interest or pleasure in doing things 0   Q2: Feeling down, depressed or hopeless 0   PHQ-2 Score 0   Q1: Little interest or pleasure in doing things Not at all   Q2: Feeling down, depressed or hopeless Not at all   PHQ-2 Score 0   Abuse: Current or Past(Physical, Sexual or Emotional)- No  Do you feel safe in your environment? Yes    Social History     Tobacco Use     Smoking status: Never Smoker     Smokeless tobacco: Never Used   Substance Use Topics     Alcohol use: Never     Frequency: Never     If you drink alcohol do you typically have >3 drinks per day or >7 drinks per week? Not applicable    Alcohol Use 2020   Prescreen: >3 drinks/day or >7 drinks/week? Not Applicable       Reviewed orders with patient.  Reviewed health maintenance and updated orders accordingly - Yes  Lab work  is in process  BP Readings from Last 3 Encounters:   07/17/20 136/84   12/24/19 138/78   11/08/19 (!) 160/91    Wt Readings from Last 3 Encounters:   07/17/20 54.4 kg (120 lb)   11/08/19 54.9 kg (121 lb)   10/09/19 54.9 kg (121 lb)                  Patient Active Problem List   Diagnosis     Cyst of left ovary     Family history of breast cancer in first degree relative     Benign essential hypertension     Past Surgical History:   Procedure Laterality Date     AS REMOVAL OF OVARIAN CYST(S) Left 2016       Social History     Tobacco Use     Smoking status: Never Smoker     Smokeless tobacco: Never Used   Substance Use Topics     Alcohol use: Never     Frequency: Never     Family History   Problem Relation Age of Onset     Breast Cancer Sister 46         Current Outpatient Medications   Medication Sig Dispense Refill     ciclopirox (PENLAC) 8 % external solution Apply to adjacent skin and affected nails daily.  Remove with alcohol every 7 days, then repeat. 6 mL 6     NIFEdipine ER OSMOTIC (PROCARDIA XL) 30 MG 24 hr tablet Take 1 tablet (30 mg) by mouth daily 90 tablet 0     norethindrone (MICRONOR) 0.35 MG tablet Take 1 tablet (0.35 mg) by mouth daily 84 tablet 0       Mammogram Screening: Patient under age 50, mutual decision reflected in health maintenance.      Pertinent mammograms are reviewed under the imaging tab.  History of abnormal Pap smear: NO - age 30-65 PAP every 5 years with negative HPV co-testing recommended     Reviewed and updated as needed this visit by clinical staff  Tobacco  Allergies  Med Hx  Surg Hx  Fam Hx  Soc Hx        Reviewed and updated as needed this visit by Provider            Review of Systems   Constitutional: Negative for chills and fever.   HENT: Negative for congestion, ear pain, hearing loss and sore throat.    Eyes: Negative for pain and visual disturbance.   Respiratory: Negative for cough and shortness of breath.    Cardiovascular: Negative for chest pain, palpitations  "and peripheral edema.   Gastrointestinal: Negative for abdominal pain, constipation, diarrhea, heartburn, hematochezia and nausea.   Breasts:  Negative for tenderness, breast mass and discharge.   Genitourinary: Negative for dysuria, frequency, genital sores, hematuria, pelvic pain, urgency, vaginal bleeding and vaginal discharge.   Musculoskeletal: Negative for arthralgias, joint swelling and myalgias.   Skin: Negative for rash.   Neurological: Negative for dizziness, weakness, headaches and paresthesias.   Psychiatric/Behavioral: Negative for mood changes. The patient is not nervous/anxious.       OBJECTIVE:   /84 (BP Location: Right arm, Patient Position: Chair, Cuff Size: Adult Regular)   Pulse 97   Temp 98.7  F (37.1  C) (Oral)   Resp 20   Ht 1.619 m (5' 3.75\")   Wt 54.4 kg (120 lb)   SpO2 100%   BMI 20.76 kg/m    Physical Exam  GENERAL: healthy, alert and no distress  EYES: Eyes grossly normal to inspection,   HENT: ear canals and TM's normal, nose and mouth without ulcers or lesions  NECK: no adenopathy, no asymmetry, masses, or scars and thyroid normal to palpation  RESP: lungs clear to auscultation - no rales, rhonchi or wheezes  BREAST: normal without masses, tenderness or nipple discharge and no palpable axillary masses or adenopathy  CV: regular rate and rhythm, normal S1 S2, no S3 or S4, no murmur, click or rub, no peripheral edema and peripheral pulses strong  ABDOMEN: soft, nontender, no hepatosplenomegaly, no masses and bowel sounds normal   (female): normal female external genitalia, normal urethral meatus, vaginal mucosa pink, moist, well rugated, and normal cervix/adnexa/uterus without masses or discharge  MS: no gross musculoskeletal defects noted, no edema  SKIN: no suspicious lesions or rashes  NEURO: Normal strength and tone, mentation intact and speech normal  PSYCH: mentation appears normal, affect normal/bright      ASSESSMENT/PLAN:   Piyush was seen today for " "physical.    Diagnoses and all orders for this visit:    Routine general medical examination at a health care facility:  Again suggested having genetic testing completed to determine appropriate screening for breast cancer.  -     CBC with platelets differential  -     Comprehensive metabolic panel  -     Lipid panel reflex to direct LDL Fasting  -     Wet prep    Menorrhagia with irregular cycle  -     TSH with free T4 reflex  -     Iron and iron binding capacity    Cervical cancer screening  -     Pap imaged thin layer screen with HPV - recommended age 30 - 65 years (select HPV order below)  -     HPV High Risk Types DNA Cervical    Benign essential hypertension:  /84, well controlled.   -     NIFEdipine ER OSMOTIC (PROCARDIA XL) 30 MG 24 hr tablet; Take 1 tablet (30 mg) by mouth daily        COUNSELING:  Reviewed preventive health counseling, as reflected in patient instructions       Regular exercise       Healthy diet/nutrition       Contraception    Estimated body mass index is 20.76 kg/m  as calculated from the following:    Height as of this encounter: 1.619 m (5' 3.75\").    Weight as of this encounter: 54.4 kg (120 lb).         reports that she has never smoked. She has never used smokeless tobacco.      Counseling Resources:  ATP IV Guidelines  Pooled Cohorts Equation Calculator  Breast Cancer Risk Calculator  FRAX Risk Assessment  ICSI Preventive Guidelines  Dietary Guidelines for Americans, 2010  USDA's MyPlate  ASA Prophylaxis  Lung CA Screening  Follow up in 6 months for BP check, sooner as needed.  Susan Haase, APRN [facility] [place] [facility] [place]"

## 2020-07-19 NOTE — RESULT ENCOUNTER NOTE
Juan Pickett,  Your vaginal sample is negative for yeast and bacterial vaginosis.  Your CBC is normal (no indications of anemia or infection).  Sincerely,   Susan Haase, CNP

## 2020-07-20 LAB
ALBUMIN SERPL-MCNC: 3.8 G/DL (ref 3.4–5)
ALP SERPL-CCNC: 79 U/L (ref 40–150)
ALT SERPL W P-5'-P-CCNC: 24 U/L (ref 0–50)
ANION GAP SERPL CALCULATED.3IONS-SCNC: 7 MMOL/L (ref 3–14)
AST SERPL W P-5'-P-CCNC: 12 U/L (ref 0–45)
BILIRUB SERPL-MCNC: 0.3 MG/DL (ref 0.2–1.3)
BUN SERPL-MCNC: 13 MG/DL (ref 7–30)
CALCIUM SERPL-MCNC: 8.4 MG/DL (ref 8.5–10.1)
CHLORIDE SERPL-SCNC: 105 MMOL/L (ref 94–109)
CHOLEST SERPL-MCNC: 209 MG/DL
CO2 SERPL-SCNC: 23 MMOL/L (ref 20–32)
CREAT SERPL-MCNC: 0.48 MG/DL (ref 0.52–1.04)
GFR SERPL CREATININE-BSD FRML MDRD: >90 ML/MIN/{1.73_M2}
GLUCOSE SERPL-MCNC: 88 MG/DL (ref 70–99)
HDLC SERPL-MCNC: 75 MG/DL
IRON SATN MFR SERPL: 7 % (ref 15–46)
IRON SERPL-MCNC: 32 UG/DL (ref 35–180)
LDLC SERPL CALC-MCNC: 109 MG/DL
NONHDLC SERPL-MCNC: 134 MG/DL
POTASSIUM SERPL-SCNC: 3.7 MMOL/L (ref 3.4–5.3)
PROT SERPL-MCNC: 8.4 G/DL (ref 6.8–8.8)
SODIUM SERPL-SCNC: 135 MMOL/L (ref 133–144)
TIBC SERPL-MCNC: 442 UG/DL (ref 240–430)
TRIGL SERPL-MCNC: 126 MG/DL
TSH SERPL DL<=0.005 MIU/L-ACNC: 1.81 MU/L (ref 0.4–4)

## 2020-07-22 LAB
COPATH REPORT: NORMAL
PAP: NORMAL

## 2020-07-24 NOTE — RESULT ENCOUNTER NOTE
Juan Pickett,  Your lab results are as below:  1)  TSH (thyroid level) 1.81 which is normal (range 0.4-4)  2)  Cholesterol is slightly elevated at 209, your LDL (bad cholesterol) and your HDL (good cholesterol) were within normal range. Continue to follow a low cholesterol diet and we will recheck this in 1 year.  3)  Glucose is normal at 88 (normal fasting is <100).  4)  Your iron saturation is slightly low at 7%, I would recommend a multivitamin with iron on a daily basis to increase your iron storage.    If you have any questions do not hesitate to call the clinic to discuss the results with me further.     Sincerely,    Susan Haase, CNP

## 2020-07-27 LAB
FINAL DIAGNOSIS: NORMAL
HPV HR 12 DNA CVX QL NAA+PROBE: NEGATIVE
HPV16 DNA SPEC QL NAA+PROBE: NEGATIVE
HPV18 DNA SPEC QL NAA+PROBE: NEGATIVE
SPECIMEN DESCRIPTION: NORMAL
SPECIMEN SOURCE CVX/VAG CYTO: NORMAL

## 2020-08-07 DIAGNOSIS — Z00.00 ANNUAL PHYSICAL EXAM: ICD-10-CM

## 2020-08-07 DIAGNOSIS — Z87.42 HISTORY OF ENDOMETRIOSIS: ICD-10-CM

## 2020-08-07 RX ORDER — ACETAMINOPHEN AND CODEINE PHOSPHATE 120; 12 MG/5ML; MG/5ML
SOLUTION ORAL
Qty: 84 TABLET | Refills: 0 | Status: SHIPPED | OUTPATIENT
Start: 2020-08-07 | End: 2021-08-27

## 2020-08-07 NOTE — TELEPHONE ENCOUNTER
Pt returned call.  She had not been taking the Micronor for 4 months due to prolonged bleeding, but realized it helps with her pain from her endometriosis.  She wants to re-start it.  She does not believe she is pregnant, her LMP was 7/26/20.      Can we fill? Please advise.    Qiana Sousa RN

## 2020-08-07 NOTE — TELEPHONE ENCOUNTER
The patient  expresses a desire to restart Micronor.  The patient should have a pregnancy test done to make sure that she is not pregnant.  Typically women start the birth control pill on the Sunday following the onset of her menses.  She should use a backup contraceptive for the first 2 weeks on the pill.  I did send a refill for Micronor to her listed pharmacy.  Please relay this information as well as instructions in the use of OCPs to the patient

## 2020-08-07 NOTE — TELEPHONE ENCOUNTER
Left message to call back on voicemail on cell phone.  This mediation was discontinued in epic on 7/17/0 by Susan Haase.   Please verify with the pt that she is still taking it.      SARITA Rasmussen RN

## 2020-08-18 ENCOUNTER — VIRTUAL VISIT (OUTPATIENT)
Dept: ONCOLOGY | Facility: CLINIC | Age: 45
End: 2020-08-18
Attending: NURSE PRACTITIONER
Payer: COMMERCIAL

## 2020-08-18 DIAGNOSIS — Z80.3 FAMILY HISTORY OF MALIGNANT NEOPLASM OF BREAST: Primary | ICD-10-CM

## 2020-08-18 PROCEDURE — 96040 ZZH GENETIC COUNSELING, EACH 30 MINUTES: CPT | Mod: GT,ZF | Performed by: GENETIC COUNSELOR, MS

## 2020-08-18 NOTE — LETTER
Cancer Risk Management  Program Locations    [facility]  [facility]  [facility]  [facility]  Mailing Address  Cancer Risk Management Program  [facility]  [address]  [facility] [address]  [place] [address]    New patient appointments  [phone]  August 18, 2020    Piyush Feldman  [address]   [facility] [address]      Dear Piyush,    It was a pleasure speaking with you on the phone for genetic counseling on 8/18/2020. Here is a copy of the progress note from our discussion. If you have any additional questions, please feel free to call.    Referring Provider: Susan Rachele Haase, APRN CNP     Presenting Information:   I spoke with Piyush Feldman over the phone today for genetic counseling to discuss her family history of cancer. With her permission, this appointment was conducted over the phone due to COVID-19 precautions. We talked today to review this history, cancer screening recommendations, and available genetic testing options.    Personal History:  Piyush is a 44 year old female. She does not have any personal history of cancer.    She had her first menstrual period at age 12, her first child at age 26, and is premenopausal. Piyush has her ovaries, fallopian tubes and uterus in place. A pelvic ultrasound on 10/15/19 showed uterine fibroids and complex bilateral ovarian cyst(s). She reports that she has not used hormone replacement therapy or infertility medications. She has used oral contraceptives. She has clinical breast exams and mammograms; her most recent mammogram on 9/26/19 was negative. Piyush has not had a colonoscopy due to her age. Piyush reported no history of tobacco use and no alcohol use.    Family History: (Please see scanned pedigree for detailed family history information)  Siblings:  Her sister is 53 years old and was  first diagnosed with breast cancer (stage 0) in her 40s. This was treated with surgery. She was then diagnosed with breast cancer again in her 50s (unknown if this was a recurrence or second primary cancer). She underwent bilateral mastectomy. Her uterus and ovaries are intact. Piyush is not sure if she has had any genetic testing. She will find out more about this.    Her other sister was diagnosed with breast cancer at age 42 and passed away at 42. She was treated with radiation and chemotherapy. Her uterus and ovaries were intact. She did not have any genetic testing.   She has one brother who is 46 years old with no known history of cancer. He has one healthy 5 year old daughter. Neither of her sisters had any children.  Maternal:  Her mother passed away at age 56 due to a stroke. She did not have any known history of cancer. Her uterus and ovaries were intact.   Her maternal aunt passed away in her 60s possibly due to colon cancer. She has limited details about this.   Her mother had 7 other siblings. No known history of cancer in these relatives.   Her maternal grandmother passed away in her 30s during childbirth.  Her maternal grandfather passed away in his 90s. He may have had colon cancer. She has limited details about this.   Paternal:  Her father is in his mid-late 70s with no known history of cancer.    Her paternal aunt passed away in her 40s-50s due to leukemia.   Her paternal half-aunt (through her grandfather) was diagnosed with breast cancer in her 40s and passed away.  Her father had 3 other full siblings and 4 or 5 paternal half-siblings with no known history of cancer.  Her paternal grandmother passed away in her early 30s (unknown cause).   Her paternal grandfather passed away in his early 90s with no known history of cancer.     Her maternal ethnicity is [place]. Her paternal ethnicity is [place]. There is no known Ashkenazi Advent ancestry on either side of her family.      Discussion:    Piyush's family history of cancer is suggestive of a hereditary cancer syndrome.    We reviewed the features of sporadic, familial, and hereditary cancers. In looking at Piyush's family history, it is possible that a cancer susceptibility gene is present due to her family history of young breast cancer in her sisters.    We discussed the BRCA1 and BRCA2 genes. Mutations in these genes cause a condition known as Hereditary Breast and Ovarian Cancer syndrome (HBOC). Women with a mutation in either of these genes are at increased risk for breast and ovarian cancer. There is also an increased risk for a second primary breast cancer. Men with a mutation in either of these genes are at increased risk for breast and prostate cancer. Both women and men may also be at increased risk for pancreatic cancer and melanoma. A detailed handout regarding these genes and other genes in which mutations are associated with an increased risk for breast cancer will be provided to Piyush and can be found in the after visit summary. Topics included: inheritance pattern, cancer risks, cancer screening recommendations, and also risks, benefits and limitations of testing.    Based on her personal and family history, Piyush meets current National Comprehensive Cancer Network (NCCN) criteria for genetic testing of high-penetrance breast and/or ovarian cancer susceptibility genes, which often includes BRCA1, BRCA2, CDH1, PALB2, PTEN, and TP53 among others.     We discussed that there are additional genes that could cause increased risk for breast cancer. As many of these genes present with overlapping features in a family and accurate cancer risk cannot always be established based upon the pedigree analysis alone, it would be reasonable for Piyush to consider panel genetic testing to analyze multiple genes at once.    We reviewed genetic testing options for hereditary breast and gynecologic cancers: actionable high/moderate  breast and gynecologic cancer risk custom panel (CustomNext-Cancer, 19 genes, a combination of BRCANext +  STK11) and expanded high and moderate risk panel (BRCANext-Expanded, 23 genes), or expanded panel options to also include genes associated with other types of cancer. Piyush expressed an interest in only the actionable high/moderate genes. She opted for the CustomNext-Cancer 19-gene panel.  The CustomNext-Cancer high/moderate risk breast and gynecologic cancer panel includes the following 19 genes: ILENE, BRCA1, BRCA2, BRIP1, CDH1, CHEK2, EPCAM, MLH1, MSH2, MSH6, NBN, NF1, PALB2, PMS2, PTEN, RAD51C, RAD51D, STK11, and TP53. This custom panel is a combination of the BRCANext panel + STK11 gene.  Some of the genes on this custom panel are associated with specific hereditary cancer syndromes and have published management guidelines: Hereditary Breast and Ovarian Cancer syndrome (BRCA1, BRCA2), Colbert syndrome (EPCAM, MLH1, MSH2, MSH6, PMS2), Hereditary Diffuse Gastric Cancer (CDH1), Cowden Syndrome (PTEN), Peutz-Jeghers syndrome, Neurofibromatosis type 1 (NF1), and Li Fraumeni syndrome (TP53).     Risk-reducing salpingo-oophorectomy can be considered in women with mutations in BRIP1, RAD51C, or RAD51D. ILENE, CHEK2, NBN, and PALB2 are associated with breast cancer risk and have published screening guidelines.  Due to COVID-19 precautions, this appointment was conducted entirely over the phone, therefore consent was obtained over the phone. This is indicated on the consent form, which also includes my signature (as the provider who obtained consent). Genetic testing via the CustomNext-Cancer 19-gene panel will be sent to [facility]. Piyush opted to have a saliva sample collection kit shipped directly to her home from [facility]. She will ship the kit back to [facility] for analysis. Turnaround time from date when sample is received at the lab: approximately 3-4 weeks.    Medical Management: For Piyush, we  reviewed that the information from genetic testing may determine:    additional cancer screening for which Piyush may qualify (i.e. mammogram and breast MRI, more frequent colonoscopies, more frequent dermatologic exams, etc.),    options for risk reducing surgeries Piyush could consider (i.e. bilateral mastectomy, surgery to remove her ovaries and/or uterus, etc.),      and targeted chemotherapies if she were to develop certain cancers in the future (i.e. immunotherapy for individuals with Colbert syndrome, PARP inhibitors, etc.).     These recommendations will be discussed in detail once genetic testing is completed.     Plan:  1) Today Piyush elected to proceed with genetic testing via the CustomNext-Cancer high/moderate risk breast and gynecologic cancer panel offered by [facility].  2) This information should be available in 4-5 weeks.  3) Piyush will receive a phone call to discuss the results.    Gertrudis White MS, [facility]  Licensed Genetic Counselor  Office: [phone]              Assessing Cancer Risk  Only about 5-10% of cancers are thought to be due to an inherited cancer susceptibility gene.    These families often have:    Several people with the same or related types of cancer    Cancers diagnosed at a young age (before age 50)    Individuals with more than one primary cancer    Multiple generations of the family affected with cancer    Some people may be candidates for genetic testing of more than one gene.  For these families, genetic testing using a cancer panel may be offered.  These panels will test different genes known to increase the risk for breast, ovarian, uterine, and/or other cancers. All of the genes discussed below have published clinical management guidelines for individuals who are found to carry a mutation. The purpose of this handout is to serve as a brief summary of the genes analyzed by the panels used to inquire about hereditary breast and gynecologic cancer:  ILENE, BRCA1, BRCA2,  BRIP1, CDH1, CHEK2, MLH1, MSH2, MSH6, PMS2, EPCAM, PTEN, PALB2, RAD51C, RAD51D, and TP53.  ______________________________________________________________________________  Hereditary Breast and Ovarian Cancer Syndrome   (BRCA1 and BRCA2)  A single mutation in one of the copies of BRCA1 or BRCA2 increases the risk for breast and ovarian cancer, among others.  The risk for pancreatic cancer and melanoma may also be slightly increased in some families.  The chart below shows the chance that someone with a BRCA mutation would develop cancer in his or her lifetime1,2,3,4.        A person s ethnic background is also important to consider, as individuals of Ashkenazi Scientologist ancestry have a higher chance of having a BRCA gene mutation.  There are three BRCA mutations that occur more frequently in this population.    Colbert Syndrome   (MLH1, MSH2, MSH6, PMS2, and EPCAM)  Currently five genes are known to cause Colbert Syndrome: MLH1, MSH2, MSH6, PMS2, and EPCAM.  A single mutation in one of the Colbert Syndrome genes increases the risk for colon, endometrial, ovarian, and stomach cancers.  Other cancers that occur less commonly in Colbert Syndrome include urinary tract, skin, and brain cancers.  The chart below shows the chance that a person with Colbert syndrome would develop cancer in his or her lifetime5.      *Cancer risk varies depending on Colbert syndrome gene found    Cowden Syndrome   (PTEN)  Cowden syndrome is a hereditary condition that increases the risk for breast, thyroid, endometrial, colon, and kidney cancer.  Cowden syndrome is caused by a mutation in the PTEN gene.  A single mutation in one of the copies of PTEN causes Cowden syndrome and increases cancer risk.  The chart below shows the chance that someone with a PTEN mutation would develop cancer in their lifetime6,7.  Other benign features seen in some individuals with Cowden syndrome include benign skin lesions (facial papules, keratoses, lipomas), learning  disability, autism, thyroid nodules, colon polyps, and larger head size.      *One recent study found breast cancer risk to be increased to 85%    Li-Fraumeni Syndrome   (TP53)  Li-Fraumeni Syndrome (LFS) is a cancer predisposition syndrome caused by a mutation in the TP53 gene. A single mutation in one of the copies of TP53 increases the risk for multiple cancers. Individuals with LFS are at an increased risk for developing cancer at a young age. The lifetime risk for development of a LFS-associated cancer is 50% by age 30 and 90% by age 60.   Core Cancers: Sarcomas, Breast, Brain, Lung, Leukemias/Lymphomas, Adrenocortical carcinomas  Other Cancers: Gastrointestinal, Thyroid, Skin, Genitourinary    Hereditary Diffuse Gastric Cancer   (CDH1)  Currently, one gene is known to cause hereditary diffuse gastric cancer (HDGC): CDH1.  Individuals with HDGC are at increased risk for diffuse gastric cancer and lobular breast cancer. Of people diagnosed with HDGC, 30-50% have a mutation in the CDH1 gene.  This suggests there are likely other genes that may cause HDGC that have not been identified yet.      Lifetime Cancer Risks    General Population HDGC    Diffuse Gastric  <1% ~80%   Breast 12% 39-52%         Additional Genes  ILENE  ILENE is a moderate-risk breast cancer gene. Women who have a mutation in ILENE can have between a 2-4 fold increased risk for breast cancer compared to the general population8. ILENE mutations have also been associated with increased risk for pancreatic cancer, however an estimate of this cancer risk is not well understood9. Individuals who inherit two ILENE mutations have a condition called ataxia-telangiectasia (AT).  This rare autosomal recessive condition affects the nervous system and immune system, and is associated with progressive cerebellar ataxia beginning in childhood.  Individuals with ataxia-telangiectasia often have a weakened immune system and have an increased risk for childhood  cancers.    PALB2  Mutations in PALB2 have been shown to increase the risk of breast cancer up to 33-58% in some families; where individuals fall within this risk range is dependent upon family [id number]. PALB2 mutations have also been associated with increased risk for pancreatic cancer, although this risk has not been quantified yet.  Individuals who inherit two PALB2 mutations--one from their mother and one from their father--have a condition called Fanconi Anemia.  This rare autosomal recessive condition is associated with short stature, developmental delay, bone marrow failure, and increased risk for childhood cancers.    CHEK2   CHEK2 is a moderate-risk breast cancer gene.  Women who have a mutation in CHEK2 have around a 2-fold increased risk for breast cancer compared to the general population, and this risk may be higher depending upon family history.11,12,13 Mutations in CHEK2 have also been shown to increase the risk of a number of other cancers, including colon and prostate, however these cancer risks are currently not well understood.    BRIP1, RAD51C and RAD51D  Mutations in BRIP1, RAD51C, and RAD51D have been shown to increase the risk of ovarian cancer and possibly female breast cancer as well14,15 .       Lifetime Cancer Risk    General Population BRIP1 RAD51C RAD51D   Ovarian 1-2% ~5-8% ~5-9% ~7-15%           Inheritance  All of the cancer syndromes reviewed above are inherited in an autosomal dominant pattern.  This means that if a parent has a mutation, each of his or her children will have a 50% chance of inheriting that same mutation.  Therefore, each child--male or female--would have a 50% chance of being at increased risk for developing cancer.      Image obtained from [employer], 2013     Mutations in some genes can occur de vaishnavi, which means that a person s mutation occurred for the first time in them and was not inherited from a parent.  Now that they have the mutation,  however, it can be passed on to future generations.    Genetic Testing  Genetic testing involves a blood test and will look at the genetic information in the ILENE, BRCA1, BRCA2, BRIP1, CDH1, CHEK2, MLH1, MSH2, MSH6, PMS2, EPCAM, PTEN, PALB2, RAD51C, RAD51D, and TP53 genes for any harmful mutations that are associated with increased cancer risk.  If possible, it is recommended that the person(s) who has had cancer be tested before other family members.  That person will give us the most useful information about whether or not a specific gene is associated with the cancer in the family.    Results  There are three possible results of genetic testing:    Positive--a harmful mutation was identified in one or more of the genes    Negative--no mutation was identified in any of the genes on this panel    Variant of unknown significance--a variation in one of the genes was identified, but it is unclear how this impacts cancer risk in the family    Advantages and Disadvantages   There are advantages and disadvantages to genetic testing.    Advantages    May clarify your cancer risk    Can help you make medical decisions    May explain the cancers in your family    May give useful information to your family members (if you share your results)    Disadvantages    Possible negative emotional impact of learning about inherited cancer risk    Uncertainty in interpreting a negative test result in some situations    Possible genetic discrimination concerns (see below)    Genetic Information Nondiscrimination Act (RAMA)  RAMA is a federal law that protects individuals from health insurance or employment discrimination based on a genetic test result alone.  Although rare, there are currently no legal discrimination protections in terms of life insurance, long term care, or disability insurances.  Visit the National Human Genome Research Beacon website to learn more.    Reducing Cancer Risk  All of the genes described above have  nationally recognized cancer screening guidelines that would be recommended for individuals who test positive.  In addition to increased cancer screening, surgeries may be offered or recommended to reduce cancer risk.  Recommendations are based upon an individual s genetic test result as well as their personal and family history of cancer.    Questions to Think About Regarding Genetic Testing:    What effect will the test result have on me and my relationship with my family members if I have an inherited gene mutation?  If I don t have a gene mutation?    Should I share my test results, and how will my family react to this news, which may also affect them?    Are my children ready to learn new information that may one day affect their own health?    Hereditary Cancer Resources    FORCE: Facing Our Risk of Cancer Empowered facingourrisk.org   Bright Pink bebrightpink.org   Li-Fraumeni Syndrome Association lfsassociation.org   PTEN World PTENworld.com   No stomach for cancer, Inc. nostomachforcancer.org   Stomach cancer relief network Scrnet.org   Collaborative Group of the Americas on Inherited Colorectal Cancer (CGA) cgaicc.com    Cancer Care cancercare.org   American Cancer Society (ACS) cancer.org   National Cancer [place] (NCI) cancer.gov     Please call us if you have any questions or concerns.   Cancer Risk Management Program [phone] ([phone])  ? Gustabo Ch, MS, [facility] [phone]  ? Tosha Jackson, MS, [facility]  [phone]  ? Natalya Julien, MS, [facility]  [phone]  ? Noreen Bains, MS, [facility] [phone]  ? Haydee De La Torre, MS, [facility] [phone]  ? [place], MS, [facility]  [phone]  ? Gertrudis White, MS, [facility]  [phone]    References  1. Arturo FLANNERY, Faizan PDP, Renetta S, Yamilet ZARAGOZA, Elli JE, Patricia JL, Rosi N, Frances H, Thaddeus O, Fidencio A, Lester B, Maryse P, Brandi S, Sharonda DM, Joe N, Josie E, Sheryl H, Lorne E, Ra J, Ama J, Shell ESCOBAR, Rene H, Andres S, Beth PATHAK, Gerardo  H, Martinez K, Gina ARMIJO. Average risks of breast and ovarian cancer associated with BRCA1 or BRCA2 mutations detected in case series unselected for family history: a combined analysis of 222 studies. Am J Hum Hanna. 2003;72:1117-30.  2. Jermaine CALDERÓN, Geovanna WOODY, Reginald G.  BRCA1 and BRCA2 Hereditary Breast and Ovarian Cancer. Gene Reviews online. 2013.  3. Juanpablo YC, Shellie S, Sergio G, Brewer S. Breast cancer risk among male BRCA1 and BRCA2 mutation carriers. J Natl Cancer Inst. 2007;99:1811-4.  4. George SHARIF, Kevin I, Fletcher J, Liliana E, Francis ER, Emeli F. Risk of breast cancer in male BRCA2 carriers. J Med Hanna. 2010;47:710-1.  5. National Comprehensive Cancer Network. Clinical practice guidelines in oncology, colorectal cancer screening. Available online (registration required). 2015.  6. Kyree MH, Qian J, Meg J, Lanie SIDDIQI, Kelly MS, Eng C. Lifetime cancer risks in individuals with germline PTEN mutations. Clin Cancer Res. 2012;18:400-7.  7. Pilarski R. Cowden Syndrome: A Critical Review of the Clinical Literature. J Hanna . 2009:18:13-27.  8. Arely A, Trino D, Elena S, Mercy P, Ismaeli T, Corey M, Lux B, Abdiaziz H, Viktor R, Brennan K, Mary Carmen L, George SHARIF, Sharonda D, Tucker DF, Madeline MR, The Breast Cancer Susceptibility Collaboration (UK) & Brigido N. ILENE mutations that cause ataxia-telangiectasia are breast cancer susceptibility alleles. Nature Genetics. 2006;38:873-875  9. Bharat N , Bossman Y, Karen J, Miguel L, Gianfranco GM , Lynda ML, Gallinger S, Arrieta AG, Syngal S, Jayshree ML, Becky J , Jennifer R, Mini SZ, Karina JR, Rod VE, Berry M, Vodiliastein B, Jose N, Mery RH, Rena KW, and Justice AP. ILENE mutations in patients with hereditary pancreatic cancer. Cancer Discover. 2012;2:41-46  10. Arturo PEÑA et al. Breast-Cancer Risk in Families with Mutations in PALB2. NEJM. 2014; 371(6):497-506.  11. CHEK2 Breast Cancer Case-Control Consortium.  CHEK2*1100delC and susceptibility to breast cancer: A collaborative analysis involving 10,860 breast cancer cases and 9,065 controls from 10 studies. Am J Hum Hanna, 74 (2004), pp. 8981-0579  12. Daniel BRAVO, Hiren S, Suzette K, et al. Spectrum of Mutations in BRCA1, BRCA2, CHEK2, and TP53 in Families at High Risk of Breast Cancer. ASIM. 2006;295(12):2878-9947.   13. Td C, Iva D, Argelia FLANNERY, et al. Risk of breast cancer in women with a CHEK2 mutation with and without a family history of breast cancer. J Clin Oncol. 2011;29:8087-0868.  14. Darek H, Gilberto E, Charanjit SJ, et al. Contribution of germline mutations in the RAD51B, RAD51C, and RAD51D genes to ovarian cancer in the population. J Clin Oncol. 2015;33(26):2971-1019. Doi:10.1200/JCO.2015.61.2408.  15. Katherine T, German RAMIREZ, Nhi P, et al. Mutations in BRIP1 confer high risk of ovarian cancer. Madalyn Hanna. 2011;43(11):4324-2047. doi:10.1038/ng.955.

## 2020-08-18 NOTE — PATIENT INSTRUCTIONS
Assessing Cancer Risk  Only about 5-10% of cancers are thought to be due to an inherited cancer susceptibility gene.    These families often have:    Several people with the same or related types of cancer    Cancers diagnosed at a young age (before age 50)    Individuals with more than one primary cancer    Multiple generations of the family affected with cancer    Some people may be candidates for genetic testing of more than one gene.  For these families, genetic testing using a cancer panel may be offered.  These panels will test different genes known to increase the risk for breast, ovarian, uterine, and/or other cancers. All of the genes discussed below have published clinical management guidelines for individuals who are found to carry a mutation. The purpose of this handout is to serve as a brief summary of the genes analyzed by the panels used to inquire about hereditary breast and gynecologic cancer:  ILENE, BRCA1, BRCA2, BRIP1, CDH1, CHEK2, MLH1, MSH2, MSH6, PMS2, EPCAM, PTEN, PALB2, RAD51C, RAD51D, and TP53.  ______________________________________________________________________________  Hereditary Breast and Ovarian Cancer Syndrome   (BRCA1 and BRCA2)  A single mutation in one of the copies of BRCA1 or BRCA2 increases the risk for breast and ovarian cancer, among others.  The risk for pancreatic cancer and melanoma may also be slightly increased in some families.  The chart below shows the chance that someone with a BRCA mutation would develop cancer in his or her lifetime1,2,3,4.        A person s ethnic background is also important to consider, as individuals of Ashkenazi Baptist ancestry have a higher chance of having a BRCA gene mutation.  There are three BRCA mutations that occur more frequently in this population.    Colbert Syndrome   (MLH1, MSH2, MSH6, PMS2, and EPCAM)  Currently five genes are known to cause Colbert Syndrome: MLH1, MSH2, MSH6, PMS2, and EPCAM.  A single mutation in one of the  Colbert Syndrome genes increases the risk for colon, endometrial, ovarian, and stomach cancers.  Other cancers that occur less commonly in Colbert Syndrome include urinary tract, skin, and brain cancers.  The chart below shows the chance that a person with Colbert syndrome would develop cancer in his or her lifetime5.      *Cancer risk varies depending on Colbert syndrome gene found    Cowden Syndrome   (PTEN)  Cowden syndrome is a hereditary condition that increases the risk for breast, thyroid, endometrial, colon, and kidney cancer.  Cowden syndrome is caused by a mutation in the PTEN gene.  A single mutation in one of the copies of PTEN causes Cowden syndrome and increases cancer risk.  The chart below shows the chance that someone with a PTEN mutation would develop cancer in their lifetime6,7.  Other benign features seen in some individuals with Cowden syndrome include benign skin lesions (facial papules, keratoses, lipomas), learning disability, autism, thyroid nodules, colon polyps, and larger head size.      *One recent study found breast cancer risk to be increased to 85%    Li-Fraumeni Syndrome   (TP53)  Li-Fraumeni Syndrome (LFS) is a cancer predisposition syndrome caused by a mutation in the TP53 gene. A single mutation in one of the copies of TP53 increases the risk for multiple cancers. Individuals with LFS are at an increased risk for developing cancer at a young age. The lifetime risk for development of a LFS-associated cancer is 50% by age 30 and 90% by age 60.   Core Cancers: Sarcomas, Breast, Brain, Lung, Leukemias/Lymphomas, Adrenocortical carcinomas  Other Cancers: Gastrointestinal, Thyroid, Skin, Genitourinary    Hereditary Diffuse Gastric Cancer   (CDH1)  Currently, one gene is known to cause hereditary diffuse gastric cancer (HDGC): CDH1.  Individuals with HDGC are at increased risk for diffuse gastric cancer and lobular breast cancer. Of people diagnosed with HDGC, 30-50% have a mutation in the CDH1  gene.  This suggests there are likely other genes that may cause HDGC that have not been identified yet.      Lifetime Cancer Risks    General Population HDGC    Diffuse Gastric  <1% ~80%   Breast 12% 39-52%         Additional Genes  ILENE  ILENE is a moderate-risk breast cancer gene. Women who have a mutation in ILENE can have between a 2-4 fold increased risk for breast cancer compared to the general population8. ILENE mutations have also been associated with increased risk for pancreatic cancer, however an estimate of this cancer risk is not well understood9. Individuals who inherit two ILENE mutations have a condition called ataxia-telangiectasia (AT).  This rare autosomal recessive condition affects the nervous system and immune system, and is associated with progressive cerebellar ataxia beginning in childhood.  Individuals with ataxia-telangiectasia often have a weakened immune system and have an increased risk for childhood cancers.    PALB2  Mutations in PALB2 have been shown to increase the risk of breast cancer up to 33-58% in some families; where individuals fall within this risk range is dependent upon family [id number]. PALB2 mutations have also been associated with increased risk for pancreatic cancer, although this risk has not been quantified yet.  Individuals who inherit two PALB2 mutations--one from their mother and one from their father--have a condition called Fanconi Anemia.  This rare autosomal recessive condition is associated with short stature, developmental delay, bone marrow failure, and increased risk for childhood cancers.    CHEK2   CHEK2 is a moderate-risk breast cancer gene.  Women who have a mutation in CHEK2 have around a 2-fold increased risk for breast cancer compared to the general population, and this risk may be higher depending upon family history.11,12,13 Mutations in CHEK2 have also been shown to increase the risk of a number of other cancers, including colon and prostate, however  these cancer risks are currently not well understood.    BRIP1, RAD51C and RAD51D  Mutations in BRIP1, RAD51C, and RAD51D have been shown to increase the risk of ovarian cancer and possibly female breast cancer as well14,15 .       Lifetime Cancer Risk    General Population BRIP1 RAD51C RAD51D   Ovarian 1-2% ~5-8% ~5-9% ~7-15%           Inheritance  All of the cancer syndromes reviewed above are inherited in an autosomal dominant pattern.  This means that if a parent has a mutation, each of his or her children will have a 50% chance of inheriting that same mutation.  Therefore, each child--male or female--would have a 50% chance of being at increased risk for developing cancer.      Image obtained from Genetics Home Reference, 2013     Mutations in some genes can occur de vaishnavi, which means that a person s mutation occurred for the first time in them and was not inherited from a parent.  Now that they have the mutation, however, it can be passed on to future generations.    Genetic Testing  Genetic testing involves a blood test and will look at the genetic information in the ILENE, BRCA1, BRCA2, BRIP1, CDH1, CHEK2, MLH1, MSH2, MSH6, PMS2, EPCAM, PTEN, PALB2, RAD51C, RAD51D, and TP53 genes for any harmful mutations that are associated with increased cancer risk.  If possible, it is recommended that the person(s) who has had cancer be tested before other family members.  That person will give us the most useful information about whether or not a specific gene is associated with the cancer in the family.    Results  There are three possible results of genetic testing:    Positive--a harmful mutation was identified in one or more of the genes    Negative--no mutation was identified in any of the genes on this panel    Variant of unknown significance--a variation in one of the genes was identified, but it is unclear how this impacts cancer risk in the family    Advantages and Disadvantages   There are advantages and  disadvantages to genetic testing.    Advantages    May clarify your cancer risk    Can help you make medical decisions    May explain the cancers in your family    May give useful information to your family members (if you share your results)    Disadvantages    Possible negative emotional impact of learning about inherited cancer risk    Uncertainty in interpreting a negative test result in some situations    Possible genetic discrimination concerns (see below)    Genetic Information Nondiscrimination Act (RAMA)  RAMA is a federal law that protects individuals from health insurance or employment discrimination based on a genetic test result alone.  Although rare, there are currently no legal discrimination protections in terms of life insurance, long term care, or disability insurances.  Visit the [employer] [place] website to learn more.    Reducing Cancer Risk  All of the genes described above have nationally recognized cancer screening guidelines that would be recommended for individuals who test positive.  In addition to increased cancer screening, surgeries may be offered or recommended to reduce cancer risk.  Recommendations are based upon an individual s genetic test result as well as their personal and family history of cancer.    Questions to Think About Regarding Genetic Testing:    What effect will the test result have on me and my relationship with my family members if I have an inherited gene mutation?  If I don t have a gene mutation?    Should I share my test results, and how will my family react to this news, which may also affect them?    Are my children ready to learn new information that may one day affect their own health?    Hereditary Cancer Resources    FORCE: Facing Our Risk of Cancer Empowered facingourrisk.org   Bright Pink bebrightpink.org   Li-Fraumeni Syndrome Association lfsassociation.org   PTEN World PTENworld.com   No stomach for cancer, Inc.  nostomachforcancer.org   Stomach cancer relief network Scrnet.org   Collaborative Group of the Americas on Inherited Colorectal Cancer (CGA) cgaicc.com    Cancer Care cancercare.org   American Cancer Society (ACS) cancer.org   National Cancer Hopkins (NCI) cancer.gov     Please call us if you have any questions or concerns.   Cancer Risk Management Program [phone]-CANCER ([phone])  ? Gustabo Ch, MS, Located within [facility] [phone]  ? Tosha Jackson, MS, Located within [facility]  [phone]  ? Natalya Julien, MS, Located within [facility]  [phone]  ? Noreen Bains, MS, Located within [facility] [phone]  ? Haydee Britt, MS, Located within [facility] [phone]  ? [place], MS, Located within [facility]  [phone]  ? Gertrudis White, MS, Located within [facility]  [phone]    References  1. Arturo FLANNERY, Faizan PDP, Renetta S, Yamilet ZARAGOZA, Elli JE, Patricia JL, Rosi N, Frances H, Thaddeus O, Fidencio A, Coreyini B, Radibasia P, Manmikekimily S, Sharonda DM, Diaz N, Josie E, Sheryl H, Lorne E, Ra J, Gronwayne J, Shell B, Milius H, Thorlacius S, Eerola H, Nevmilylinna H, Martinez K, Gina OP. Average risks of breast and ovarian cancer associated with BRCA1 or BRCA2 mutations detected in case series unselected for family history: a combined analysis of 222 studies. Am J Hum Hanna. 2003;72:1117-30.  2. Jermaine N, Geovanna M, Montelongo G.  BRCA1 and BRCA2 Hereditary Breast and Ovarian Cancer. Gene Reviews online. 2013.  3. Juanpablo YC, Shellie S, Sergio G, Brewer S. Breast cancer risk among male BRCA1 and BRCA2 mutation carriers. J Natl Cancer Inst. 2007;99:1811-4.  4. George SHARIF, Kevin I, Fletcher J, Liliana E, Francis ER, Emeli F. Risk of breast cancer in male BRCA2 carriers. J Med Hanna. 2010;47:710-1.  5. National Comprehensive Cancer Network. Clinical practice guidelines in oncology, colorectal cancer screening. Available online (registration required). 2015.  6. Kyree BENSON, Qian J, Meg J, Lanie LA, Kelly BERRIOS, Mitali C. Lifetime cancer risks in individuals with germline PTEN mutations. Clin Cancer Res. 2012;18:400-7.  7. Pilarski R. Cowden  Syndrome: A Critical Review of the Clinical Literature. J Hanna . 2009:18:13-27.  8. Arely A, Trino D, Elena S, Mercy P, Justo T, Corey M, Lux B, Abdiaziz H, Viktor R, Brennan K, Mary Carmen L, George DG, Sharonda D, Tucker DF, Madeline MR, The Breast Cancer Susceptibility Collaboration (UK) & Brigido CALDERÓN. ILENE mutations that cause ataxia-telangiectasia are breast cancer susceptibility alleles. Nature Genetics. 2006;38:873-875  9. Bharat N , Bossman Y, Karen J, Miguel L, Gianfranco GM , Lynda ML, Gallinger S, Arrieta AG, Syngal S, Jayshree ML, Becky J , Jennifer R, Mini SZ, Karina JR, Rod VE, Berry M, Votenzin B, Jose N, Mery RH, Rena KW, and Vinh AP. ILENE mutations in patients with hereditary pancreatic cancer. Cancer Discover. 2012;2:41-46  10. Arturo PEÑA, et al. Breast-Cancer Risk in Families with Mutations in PALB2. NEJM. 2014; 371(6):497-506.  11. CHEK2 Breast Cancer Case-Control Consortium. CHEK2*1100delC and susceptibility to breast cancer: A collaborative analysis involving 10,860 breast cancer cases and 9,065 controls from 10 studies. Am J Hum Hanna, 74 (2004), pp. 2198-1147  12. Daniel T, Hiren S, Suzette K, et al. Spectrum of Mutations in BRCA1, BRCA2, CHEK2, and TP53 in Families at High Risk of Breast Cancer. ASIM. 2006;295(12):6780-5952.   13. Td C, Iva D, Argelia A, et al. Risk of breast cancer in women with a CHEK2 mutation with and without a family history of breast cancer. J Clin Oncol. 2011;29:3327-2552.  14. Darek H, Gilberto E, Charanjit SJ, et al. Contribution of germline mutations in the RAD51B, RAD51C, and RAD51D genes to ovarian cancer in the population. J Clin Oncol. 2015;33(26):9575-5544. Doi:10.1200/JCO.2015.61.2408.  15. Katherine T, German RAMIREZ, Nhi P, et al. Mutations in BRIP1 confer high risk of ovarian cancer. Madalyn Hanna. 2011;43(11):6431-7001. doi:10.1038/ng.955.

## 2020-08-18 NOTE — PROGRESS NOTES
"8/18/2020    Piyush Feldman is a 44 year old female who is being evaluated via a billable telephone visit.      The patient has been notified of following:     \"This telephone visit will be conducted via a call between you and your physician/provider. We have found that certain health care needs can be provided without the need for a physical exam.  This service lets us provide the care you need with a short phone conversation.  If a prescription is necessary we can send it directly to your pharmacy.  If lab work is needed we can place an order for that and you can then stop by our lab to have the test done at a later time.    Telephone visits are billed at different rates depending on your insurance coverage. During this emergency period, for some insurers they may be billed the same as an in-person visit.  Please reach out to your insurance provider with any questions.    If during the course of the call the physician/provider feels a telephone visit is not appropriate, you will not be charged for this service.\"    Patient has given verbal consent for Telephone visit?  Yes    Phone call duration: 65 minutes    *Of note, this appointment was scheduled as a video visit, but was converted to phone due to technological issues.     Referring Provider: Susan Rachele Haase, APRN CNP     Presenting Information:   I spoke with Piyush Feldman over the phone today for genetic counseling to discuss her family history of cancer. With her permission, this appointment was conducted over the phone due to COVID-19 precautions. We talked today to review this history, cancer screening recommendations, and available genetic testing options.    Personal History:  Piyush is a 44 year old female. She does not have any personal history of cancer.    She had her first menstrual period at age 12, her first child at age 26, and is premenopausal. Piyush has her ovaries, fallopian tubes and uterus in place. A pelvic ultrasound on 10/15/19 " showed uterine fibroids and complex bilateral ovarian cyst(s). She reports that she has not used hormone replacement therapy or infertility medications. She has used oral contraceptives. She has clinical breast exams and mammograms; her most recent mammogram on 9/26/19 was negative. Piyush has not had a colonoscopy due to her age. Piyush reported no history of tobacco use and no alcohol use.    Family History: (Please see scanned pedigree for detailed family history information)  Siblings:  Her sister is 53 years old and was first diagnosed with breast cancer (stage 0) in her 40s. This was treated with surgery. She was then diagnosed with breast cancer again in her 50s (unknown if this was a recurrence or second primary cancer). She underwent bilateral mastectomy. Her uterus and ovaries are intact. Piyush is not sure if she has had any genetic testing. She will find out more about this.    Her other sister was diagnosed with breast cancer at age 42 and passed away at 42. She was treated with radiation and chemotherapy. Her uterus and ovaries were intact. She did not have any genetic testing.   She has one brother who is 46 years old with no known history of cancer. He has one healthy 5 year old daughter. Neither of her sisters had any children.  Maternal:  Her mother passed away at age 56 due to a stroke. She did not have any known history of cancer. Her uterus and ovaries were intact.   Her maternal aunt passed away in her 60s possibly due to colon cancer. She has limited details about this.   Her mother had 7 other siblings. No known history of cancer in these relatives.   Her maternal grandmother passed away in her 30s during childbirth.  Her maternal grandfather passed away in his 90s. He may have had colon cancer. She has limited details about this.   Paternal:  Her father is in his mid-late 70s with no known history of cancer.    Her paternal aunt passed away in her 40s-50s due to leukemia.   Her paternal  half-aunt (through her grandfather) was diagnosed with breast cancer in her 40s and passed away.  Her father had 3 other full siblings and 4 or 5 paternal half-siblings with no known history of cancer.  Her paternal grandmother passed away in her early 30s (unknown cause).   Her paternal grandfather passed away in his early 90s with no known history of cancer.     Her maternal ethnicity is [place]. Her paternal ethnicity is [place]. There is no known Ashkenazi Mandaen ancestry on either side of her family.     Discussion:    Piyush's family history of cancer is suggestive of a hereditary cancer syndrome.    We reviewed the features of sporadic, familial, and hereditary cancers. In looking at Piyush's family history, it is possible that a cancer susceptibility gene is present due to her family history of young breast cancer in her sisters.    We discussed the BRCA1 and BRCA2 genes. Mutations in these genes cause a condition known as Hereditary Breast and Ovarian Cancer syndrome (HBOC). Women with a mutation in either of these genes are at increased risk for breast and ovarian cancer. There is also an increased risk for a second primary breast cancer. Men with a mutation in either of these genes are at increased risk for breast and prostate cancer. Both women and men may also be at increased risk for pancreatic cancer and melanoma. A detailed handout regarding these genes and other genes in which mutations are associated with an increased risk for breast cancer will be provided to Piyush and can be found in the after visit summary. Topics included: inheritance pattern, cancer risks, cancer screening recommendations, and also risks, benefits and limitations of testing.    Based on her personal and family history, Piyush meets current National Comprehensive Cancer Network (NCCN) criteria for genetic testing of high-penetrance breast and/or ovarian cancer susceptibility genes, which often includes BRCA1, BRCA2, CDH1,  PALB2, PTEN, and TP53 among others.     We discussed that there are additional genes that could cause increased risk for breast cancer. As many of these genes present with overlapping features in a family and accurate cancer risk cannot always be established based upon the pedigree analysis alone, it would be reasonable for Piyush to consider panel genetic testing to analyze multiple genes at once.    We reviewed genetic testing options for hereditary breast and gynecologic cancers: actionable high/moderate breast and gynecologic cancer risk custom panel (CustomNext-Cancer, 19 genes, a combination of BRCANext +  STK11) and expanded high and moderate risk panel (BRCANext-Expanded, 23 genes), or expanded panel options to also include genes associated with other types of cancer. Piyush expressed an interest in only the actionable high/moderate genes. She opted for the CustomNext-Cancer 19-gene panel.  The CustomNext-Cancer high/moderate risk breast and gynecologic cancer panel includes the following 19 genes: ILENE, BRCA1, BRCA2, BRIP1, CDH1, CHEK2, EPCAM, MLH1, MSH2, MSH6, NBN, NF1, PALB2, PMS2, PTEN, RAD51C, RAD51D, STK11, and TP53. This custom panel is a combination of the BRCANext panel + STK11 gene.  Some of the genes on this custom panel are associated with specific hereditary cancer syndromes and have published management guidelines: Hereditary Breast and Ovarian Cancer syndrome (BRCA1, BRCA2), Colbert syndrome (EPCAM, MLH1, MSH2, MSH6, PMS2), Hereditary Diffuse Gastric Cancer (CDH1), Cowden Syndrome (PTEN), Peutz-Jeghers syndrome, Neurofibromatosis type 1 (NF1), and Li Fraumeni syndrome (TP53).     Risk-reducing salpingo-oophorectomy can be considered in women with mutations in BRIP1, RAD51C, or RAD51D. ILENE, CHEK2, NBN, and PALB2 are associated with breast cancer risk and have published screening guidelines.  Due to COVID-19 precautions, this appointment was conducted entirely over the phone, therefore consent was  obtained over the phone. This is indicated on the consent form, which also includes my signature (as the provider who obtained consent). Genetic testing via the Complete Solar-Cancer 19-gene panel will be sent to [facility]. Piyush opted to have a saliva sample collection kit shipped directly to her home from [facility]. She will ship the kit back to [facility] for analysis. Turnaround time from date when sample is received at the lab: approximately 3-4 weeks.    Medical Management: For Piyush, we reviewed that the information from genetic testing may determine:    additional cancer screening for which Piyush may qualify (i.e. mammogram and breast MRI, more frequent colonoscopies, more frequent dermatologic exams, etc.),    options for risk reducing surgeries Piyush could consider (i.e. bilateral mastectomy, surgery to remove her ovaries and/or uterus, etc.),      and targeted chemotherapies if she were to develop certain cancers in the future (i.e. immunotherapy for individuals with Colbert syndrome, PARP inhibitors, etc.).     These recommendations will be discussed in detail once genetic testing is completed.     Plan:  1) Today Piyush elected to proceed with genetic testing via the CustomNext-Cancer high/moderate risk breast and gynecologic cancer panel offered by [facility].  2) This information should be available in 4-5 weeks.  3) Piyush will receive a phone call to discuss the results.    Time spent over the phone: 65 minutes    Gertrudis White MS, [facility]  Licensed Genetic Counselor  Office: [phone]

## 2020-09-01 DIAGNOSIS — Z80.3 FAMILY HISTORY OF MALIGNANT NEOPLASM OF BREAST: ICD-10-CM

## 2020-09-15 LAB
LAB SCANNED RESULT: NORMAL
MISCELLANEOUS TEST: NORMAL

## 2020-09-24 ENCOUNTER — MYC REFILL (OUTPATIENT)
Dept: FAMILY MEDICINE | Facility: CLINIC | Age: 45
End: 2020-09-24

## 2020-09-24 DIAGNOSIS — I10 BENIGN ESSENTIAL HYPERTENSION: ICD-10-CM

## 2020-09-24 RX ORDER — NIFEDIPINE 30 MG/1
30 TABLET, EXTENDED RELEASE ORAL DAILY
Qty: 90 TABLET | Refills: 3 | Status: CANCELLED | OUTPATIENT
Start: 2020-09-24

## 2020-09-29 ENCOUNTER — VIRTUAL VISIT (OUTPATIENT)
Dept: ONCOLOGY | Facility: CLINIC | Age: 45
End: 2020-09-29
Attending: GENETIC COUNSELOR, MS
Payer: COMMERCIAL

## 2020-09-29 DIAGNOSIS — Z80.3 FAMILY HISTORY OF MALIGNANT NEOPLASM OF BREAST: Primary | ICD-10-CM

## 2020-09-29 PROCEDURE — 40000072 ZZH STATISTIC GENETIC COUNSELING, < 16 MIN: Mod: 95,ZF | Performed by: GENETIC COUNSELOR, MS

## 2020-09-29 NOTE — LETTER
Cancer Risk Management  Program Locations    [facility]  [facility]  [facility]  [facility]  Mailing Address  Cancer Risk Management Program  [facility]  [address]  [facility] [address]  [place] [address]    New patient appointments  [phone]  September 30, 2020    Piyush Feldman  [address]   [facility] [address]      Dear Piyush,    It was a pleasure speaking with you on the phone for genetic counseling on 9/29/2020. Here is a copy of the progress note from our discussion. If you have any additional questions, please feel free to call.    Referring Provider: Susan Rachele Haase, APRN CNP     Presenting Information:  I spoke to Piyush by phone today to discuss her genetic testing results. Testing was performed on a saliva sample collected by Piyush at her home. The CustomNext-Cancer high/moderate risk breast and gynecologic cancer panel was ordered from [employer]. This testing was done because of Piyush's family history of cancer.    Genetic Testing Result: NEGATIVE  Piyush is negative for mutations in the 19 genes analyzed: ILENE, BRCA1, BRCA2, BRIP1, CDH1, CHEK2, MLH1, MSH2, MSH6, NBN, NF1, PALB2, PMS2, PTEN, RAD51C, RAD51D, STK11 and TP53 (sequencing and deletion/duplication); EPCAM (deletion/duplication only).     Interpretation:  We discussed several different interpretations of this negative test result.    1. One explanation may be that there is a different gene or combination of genes and environment that are associated with the cancers in this family.  2. It is possible that her relatives have a mutation in one of these genes and she did not inherit it.  3. There is also a small possibility that there is a mutation in one of these genes, and the testing laboratory could not find it with their current testing methods.        Screening:  Based on this negative test result, it is important for Piyush and her relatives to refer back to the family history for appropriate cancer screening.      Based on the personal and family history information she provided, Piyush has an estimated 30.8% lifetime risk of developing breast cancer based on the CHRISTAL Risk Evaluator v8 model. As such, Piyush meets current National Comprehensive Cancer Network (NCCN) guidelines for high risk breast screening. This includes annual breast MRI in addition to annual mammogram (alternating every 6 months). In addition, Piyush should be receiving clinical breast exams by her physician. We discussed that Piyush could participate in our Cancer Risk Management Program in which our clinical nurse specialist provides an individual screening plan and assists with medical management. A referral was made to see ROBEL Diaz, for this service.    She should also continue with her other routine cancer screenings, including gynecologic exams and colonoscopies. Other population cancer screening options, such as those recommended by the American Cancer Society and the National Comprehensive Cancer Network (NCCN), are also appropriate for Piyush and her family. These screening recommendations may change if there are changes to Piyush's personal and/or family history of cancer. Final screening recommendations should be made by each individual's managing physician.    Inheritance:  We reviewed the autosomal dominant inheritance of mutations in these genes. We discussed that Piyush cannot/did not pass on an identifiable mutation in these genes to her children based on this test result.  Mutations in these genes do not skip generations.      Additional Testing Considerations:  It is possible Piyush does carry a currently identifiable gene or combination of genes and environment that increase her risk for breast cancer. We again reviewed the option of larger gene panels  covering more moderate risk genes associated with breast or other types of cancer. Piyush opted to proceed with reflex testing to include all of the genes on the    BRCANext-Expanded panel.    Genetic testing is available for 23 genes associated with hereditary gynecologic, breast, and related cancers: BRCANext-Expanded (ILENE, BARD1, BRCA1, BRCA2, BRIP1, CDH1, CHEK2, DICER1, EPCAM, MLH1, MSH2, MSH6, NBN, NF1, PALB2, PMS2, PTEN, RAD51C, RAD51D, RECQL, SMARCA4, STK11, TP53).    We discussed that some of the genes in the BRCANext-Expanded panel are associated with specific hereditary cancer syndromes and published management guidelines: Hereditary Breast and Ovarian Cancer syndrome (BRCA1, BRCA2), Colbert syndrome (MLH1, MSH2, MSH6, PMS2, EPCAM), Hereditary Diffuse Gastric Cancer (CDH1), Cowden syndrome (PTEN), Li Fraumeni syndrome (TP53), Peutz-Jeghers syndrome (STK11), and Neurofibromatosis type 1 (NF1).      Risk-reducing salpingo-oophorectomy can be considered in women with mutations in BRIP1, RAD51C, or RAD51D. Breast and/or other cancer risk management guidelines are available for ILENE, CHEK2, PALB2, NF1, and NBN.    The remaining genes (BARD1, DICER1, RECQL, and SMARCA4) are associated with increased cancer risk and may allow us to make medical recommendations when mutations are identified.      Although Piyush's genetic testing result was negative, other relatives may still carry a gene mutation associated with an increased risk for cancer. Genetic counseling is recommended for her siblings, father, and paternal relatives to discuss genetic testing options. If any of these relatives do pursue genetic testing, Piyush is encouraged to contact me so that we may review the impact of their test results on her.    Summary:  We do not have an explanation for Piyush's family history of breast cancer. While no genetic changes were identified, Piyush may still be at risk for certain cancers due to family history,  environmental factors, or other genetic causes not identified by this test.  Because of that, it is important that she continue with cancer screening based on her personal and family history as discussed above.    Genetic testing is rapidly advancing, and new cancer susceptibility genes will most likely be identified in the future.  Therefore, I encouraged Piyush to contact me annually or if there are changes in her personal or family history.  This may change how we assess her cancer risk, screening, and the testing we would offer.    Plan:  1. A copy of the test results will be mailed to Piyush.  2. She plans to follow-up with ROBEL Diaz and her physicians.  3. I will contact her once I have the results of the additional reflex testing discussed above.    If Piyush has any further questions, I encouraged her to contact me at [phone].    Gertrudis White MS, [facility]  Licensed Genetic Counselor  Office: [phone]

## 2020-09-29 NOTE — Clinical Note
Please send copy of letter to patient with test results. Please enclose test results: Send outs misc test [[id number]] (Order [id number])

## 2020-09-29 NOTE — PROGRESS NOTES
"9/29/2020    Referring Provider: Susan Rachele Haase, APRN CNP     Presenting Information:  I spoke to Piyush by phone today to discuss her genetic testing results. Testing was performed on a saliva sample collected by Piyush at her home. The CustomNext-Cancer high/moderate risk breast and gynecologic cancer panel was ordered from [employer]. This testing was done because of Piyush's family history of cancer.    Genetic Testing Result: NEGATIVE  Piyush is negative for mutations in the 19 genes analyzed: ILENE, BRCA1, BRCA2, BRIP1, CDH1, CHEK2, MLH1, MSH2, MSH6, NBN, NF1, PALB2, PMS2, PTEN, RAD51C, RAD51D, STK11 and TP53 (sequencing and deletion/duplication); EPCAM (deletion/duplication only).     A copy of the test report can be found in the Laboratory tab, dated 9/1/20, and named \"SEND OUTS MISC TEST\". The report is scanned in as a linked document.    Interpretation:  We discussed several different interpretations of this negative test result.    1. One explanation may be that there is a different gene or combination of genes and environment that are associated with the cancers in this family.  2. It is possible that her relatives have a mutation in one of these genes and she did not inherit it.  3. There is also a small possibility that there is a mutation in one of these genes, and the testing laboratory could not find it with their current testing methods.       Screening:  Based on this negative test result, it is important for Piyush and her relatives to refer back to the family history for appropriate cancer screening.      Based on the personal and family history information she provided, Piyush has an estimated 30.8% lifetime risk of developing breast cancer based on the CHRISTAL Risk Evaluator v8 model. As such, Piyush meets current National Comprehensive Cancer Network (NCCN) guidelines for high risk breast screening. This includes annual breast MRI in addition to annual mammogram (alternating every 6 " months). In addition, Piyush should be receiving clinical breast exams by her physician. We discussed that Piyush could participate in our Cancer Risk Management Program in which our clinical nurse specialist provides an individual screening plan and assists with medical management. A referral was made to see ROBEL Diaz, for this service.    She should also continue with her other routine cancer screenings, including gynecologic exams and colonoscopies. Other population cancer screening options, such as those recommended by the American Cancer Society and the National Comprehensive Cancer Network (NCCN), are also appropriate for Piyush and her family. These screening recommendations may change if there are changes to Piyush's personal and/or family history of cancer. Final screening recommendations should be made by each individual's managing physician.    Inheritance:  We reviewed the autosomal dominant inheritance of mutations in these genes. We discussed that Piyush cannot/did not pass on an identifiable mutation in these genes to her children based on this test result.  Mutations in these genes do not skip generations.      Additional Testing Considerations:  It is possible Piyush does carry a currently identifiable gene or combination of genes and environment that increase her risk for breast cancer. We again reviewed the option of larger gene panels covering more moderate risk genes associated with breast or other types of cancer. Piyush opted to proceed with reflex testing to include all of the genes on the    BRCANext-Expanded panel.    Genetic testing is available for 23 genes associated with hereditary gynecologic, breast, and related cancers: BRCANext-Expanded (ILENE, BARD1, BRCA1, BRCA2, BRIP1, CDH1, CHEK2, DICER1, EPCAM, MLH1, MSH2, MSH6, NBN, NF1, PALB2, PMS2, PTEN, RAD51C, RAD51D, RECQL, SMARCA4, STK11, TP53).    We discussed that some of the genes in the BRCANext-Expanded panel are  associated with specific hereditary cancer syndromes and published management guidelines: Hereditary Breast and Ovarian Cancer syndrome (BRCA1, BRCA2), Colbert syndrome (MLH1, MSH2, MSH6, PMS2, EPCAM), Hereditary Diffuse Gastric Cancer (CDH1), Cowden syndrome (PTEN), Li Fraumeni syndrome (TP53), Peutz-Jeghers syndrome (STK11), and Neurofibromatosis type 1 (NF1).      Risk-reducing salpingo-oophorectomy can be considered in women with mutations in BRIP1, RAD51C, or RAD51D. Breast and/or other cancer risk management guidelines are available for ILENE, CHEK2, PALB2, NF1, and NBN.    The remaining genes (BARD1, DICER1, RECQL, and SMARCA4) are associated with increased cancer risk and may allow us to make medical recommendations when mutations are identified.      Although Piyush's genetic testing result was negative, other relatives may still carry a gene mutation associated with an increased risk for cancer. Genetic counseling is recommended for her siblings, father, and paternal relatives to discuss genetic testing options. If any of these relatives do pursue genetic testing, Piyush is encouraged to contact me so that we may review the impact of their test results on her.    Summary:  We do not have an explanation for Piyush's family history of breast cancer. While no genetic changes were identified, Piyush may still be at risk for certain cancers due to family history, environmental factors, or other genetic causes not identified by this test.  Because of that, it is important that she continue with cancer screening based on her personal and family history as discussed above.    Genetic testing is rapidly advancing, and new cancer susceptibility genes will most likely be identified in the future.  Therefore, I encouraged Piyush to contact me annually or if there are changes in her personal or family history.  This may change how we assess her cancer risk, screening, and the testing we would offer.    Plan:  1. A copy of  the test results will be mailed to Piyush.  2. She plans to follow-up with ROBEL Diaz and her physicians.  3. I will contact her once I have the results of the additional reflex testing discussed above.    If Piyush has any further questions, I encouraged her to contact me at [phone].    Time spent on the phone: 10 minutes.    Gertrudis White MS, [facility]  Licensed Genetic Counselor  Office: [phone]

## 2020-10-01 ENCOUNTER — TELEPHONE (OUTPATIENT)
Dept: ONCOLOGY | Facility: CLINIC | Age: 45
End: 2020-10-01

## 2020-10-02 ENCOUNTER — TELEPHONE (OUTPATIENT)
Dept: ONCOLOGY | Facility: CLINIC | Age: 45
End: 2020-10-02

## 2020-10-02 NOTE — TELEPHONE ENCOUNTER
Called patient.Left  with hours and phone. Letter sent for follow up. Referral in [facility].

## 2020-12-07 DIAGNOSIS — Z12.31 VISIT FOR SCREENING MAMMOGRAM: ICD-10-CM

## 2021-05-11 ENCOUNTER — OFFICE VISIT (OUTPATIENT)
Dept: OBGYN | Facility: CLINIC | Age: 46
End: 2021-05-11
Payer: COMMERCIAL

## 2021-05-11 VITALS — BODY MASS INDEX: 21.99 KG/M2 | DIASTOLIC BLOOD PRESSURE: 74 MMHG | WEIGHT: 127.1 LBS | SYSTOLIC BLOOD PRESSURE: 120 MMHG

## 2021-05-11 DIAGNOSIS — N94.6 DYSMENORRHEA: Primary | ICD-10-CM

## 2021-05-11 PROCEDURE — 99214 OFFICE O/P EST MOD 30 MIN: CPT | Performed by: OBSTETRICS & GYNECOLOGY

## 2021-05-11 RX ORDER — NORETHINDRONE ACETATE 5 MG
5 TABLET ORAL DAILY
Qty: 30 TABLET | Refills: 11 | Status: SHIPPED | OUTPATIENT
Start: 2021-05-11 | End: 2022-11-07

## 2021-05-11 NOTE — NURSING NOTE
"Chief Complaint   Patient presents with     Consult     for endometriosis and fibroids. Patient was put on Micronor to help with this, and it just makes her bleeding worse. Bleeding will last up to 3 weeks, and flow heavy daily.        Initial /74   Wt 57.7 kg (127 lb 1.6 oz)   LMP 2021   BMI 21.99 kg/m   Estimated body mass index is 21.99 kg/m  as calculated from the following:    Height as of 20: 1.619 m (5' 3.75\").    Weight as of this encounter: 57.7 kg (127 lb 1.6 oz).  BP completed using cuff size: regular    Questioned patient about current smoking habits.  Pt. has never smoked.          The following HM Due: NONE      Dave Garza CMA               "

## 2021-05-11 NOTE — PATIENT INSTRUCTIONS
Take 2 tabs of aleve every 8-12 hours as soon as menstrual cramps start and continue until the end of your period.    If this is not adequate improvement, you can start the aygestin with 5mg nightly.     Return after 2 cycles of aygestin for evaluation of medication start. We should test your cholesterol again after 6  Months of aygestin use.     Continue condoms for contraception.

## 2021-05-11 NOTE — PROGRESS NOTES
SUBJECTIVE:   CC: Dysmenorrhea                                               Piyush Feldman is a 45 year old  female who presents to clinic today for evaluation of worsening HUB and dysmenorrhea in the setting of known endometriosis and fibroid uterus. She was put on micronor for managmeent in 2019 but developed prolonged bleeding and stopped after 5 months. She then developed worsening dysmenorrhea and tried micronor again, but again developed 3 weeks of bleeding with each menses.   - periods now 5 days, not too heavy, but pain has increased. Missing work every other month due to pain, which lasts throughout menses and can start prior to bleeding.   - has tried NSAIDs in the past with some benefit, but has never taken them in a scheduled way.  - denies hot flashes and night sweats.  - using condoms for contraception  - denies bulk symptoms.       Problem list and histories reviewed & adjusted, as indicated.  Additional history: as documented.    Patient Active Problem List   Diagnosis     Cyst of left ovary     Family history of breast cancer in first degree relative     Benign essential hypertension     Past Surgical History:   Procedure Laterality Date     AS REMOVAL OF OVARIAN CYST(S) Left       Social History     Tobacco Use     Smoking status: Never Smoker     Smokeless tobacco: Never Used   Substance Use Topics     Alcohol use: Never     Frequency: Never      Problem (# of Occurrences) Relation (Name,Age of Onset)    Breast Cancer (1) Sister (46)            NIFEdipine ER OSMOTIC (PROCARDIA XL) 30 MG 24 hr tablet, Take 1 tablet (30 mg) by mouth daily  norethindrone (MICRONOR) 0.35 MG tablet, TAKE ONE TABLET BY MOUTH ONCE DAILY    No current facility-administered medications on file prior to visit.     No Known Allergies    OBJECTIVE:   /74   Wt 57.7 kg (127 lb 1.6 oz)   LMP 2021   BMI 21.99 kg/m     Const: sitting in chair in no acute distress, comfortable  Eyes: no scleral icterus,  EOMI  CV: regular rate, well perfused  Pulm: no increased work of breathing, no cough  Skin: warm and dry, no rashes/lesions  Psych: mood stable, appropriate affect  Abd: soft, no hepatosplenomegaly, non-tender to palpation  Neuro: A+Ox3   : External genitalia normal well-estrogenized, healthy tissue.  No obvious excoriations, lesions, or rashes. Bartholins, urethra, normal.  Normal moist pink vaginal mucosa.  Bimanual: No CMT, enlarged 10w sized uterus, large posterior MAYCO fibroid, mobile. No adnexal masses or tenderness appreciated.     ASSESSMENT/PLAN:                                                    Piyush Feldman is a 45 year old female  here for evaluation and treatment of worsening dysmenorrhea. Failed POP and not eligible for oral contraceptives given hypertension and age >35. Likely both endometriosis and fibroids are contributing to dysmenorrhea. We reviewed options for treatment including aygestin, GnRH agonists vs antagonists, and hysterectomy. While UAE or endometrial ablation might help with bleeding they would be unlikely to resolve dysmenorrhea given contribution from endometriosis. I believe that ultimately she may benefit from hysterectomy, however she is interested in attempting further medical management.     1. Dysmenorrhea  - she will first try scheduled NSAIDs from the onset of premenstrual cramping until the end of her menses. If insufficient resolution of dysmenorrhea, will then try daily aygestin after obtaining lipid panel.   - Lipid panel reflex to direct LDL Fasting; Future  - norethindrone (AYGESTIN) 5 MG tablet; Take 1 tablet (5 mg) by mouth daily  Dispense: 30 tablet; Refill: 11     F/u after 2 cycles of aygestin, sooner if poor resolution of symptoms. Can increase aygestin by 2.5mg/day up to a max of 15mg as needed for control of dysmenorrhea.       Citlali Xie MD  Obstetrics and Gynecology   [facility]

## 2021-07-25 DIAGNOSIS — I10 BENIGN ESSENTIAL HYPERTENSION: ICD-10-CM

## 2021-07-26 RX ORDER — NIFEDIPINE 30 MG/1
TABLET, EXTENDED RELEASE ORAL
Qty: 90 TABLET | Refills: 0 | Status: SHIPPED | OUTPATIENT
Start: 2021-07-26 | End: 2021-08-27

## 2021-07-26 NOTE — TELEPHONE ENCOUNTER
Medication is being filled for 1 time refill only due to:  Patient needs to be seen because it has been more than one year since last visit.  Prescription approved per [facility] Refill Protocol.  Routed to  for scheduling  Lety Vázquez RN, BSN  Message handled by CLINIC NURSE.

## 2021-08-25 ASSESSMENT — ENCOUNTER SYMPTOMS
PARESTHESIAS: 0
PALPITATIONS: 0
CHILLS: 0
ARTHRALGIAS: 0
ABDOMINAL PAIN: 0
FEVER: 0
HEMATURIA: 0
DYSURIA: 0
SHORTNESS OF BREATH: 0
CONSTIPATION: 0
FREQUENCY: 0
NERVOUS/ANXIOUS: 0
HEARTBURN: 0
BREAST MASS: 0
MYALGIAS: 0
COUGH: 0
NAUSEA: 0
HEADACHES: 0
WEAKNESS: 0
JOINT SWELLING: 0
EYE PAIN: 0
SORE THROAT: 0
DIARRHEA: 0
DIZZINESS: 0
HEMATOCHEZIA: 0

## 2021-08-27 ENCOUNTER — OFFICE VISIT (OUTPATIENT)
Dept: FAMILY MEDICINE | Facility: CLINIC | Age: 46
End: 2021-08-27
Payer: COMMERCIAL

## 2021-08-27 ENCOUNTER — MYC MEDICAL ADVICE (OUTPATIENT)
Dept: FAMILY MEDICINE | Facility: CLINIC | Age: 46
End: 2021-08-27

## 2021-08-27 VITALS
HEART RATE: 81 BPM | WEIGHT: 128 LBS | BODY MASS INDEX: 21.85 KG/M2 | OXYGEN SATURATION: 99 % | SYSTOLIC BLOOD PRESSURE: 134 MMHG | HEIGHT: 64 IN | TEMPERATURE: 98.2 F | RESPIRATION RATE: 18 BRPM | DIASTOLIC BLOOD PRESSURE: 76 MMHG

## 2021-08-27 DIAGNOSIS — Z11.4 SCREENING FOR HIV (HUMAN IMMUNODEFICIENCY VIRUS): ICD-10-CM

## 2021-08-27 DIAGNOSIS — Z00.00 ROUTINE GENERAL MEDICAL EXAMINATION AT A HEALTH CARE FACILITY: Primary | ICD-10-CM

## 2021-08-27 DIAGNOSIS — Z11.59 NEED FOR HEPATITIS C SCREENING TEST: ICD-10-CM

## 2021-08-27 DIAGNOSIS — I10 BENIGN ESSENTIAL HYPERTENSION: ICD-10-CM

## 2021-08-27 DIAGNOSIS — Z80.3 FAMILY HISTORY OF BREAST CANCER IN FIRST DEGREE RELATIVE: ICD-10-CM

## 2021-08-27 LAB
BASOPHILS # BLD AUTO: 0 10E3/UL (ref 0–0.2)
BASOPHILS NFR BLD AUTO: 1 %
EOSINOPHIL # BLD AUTO: 0.1 10E3/UL (ref 0–0.7)
EOSINOPHIL NFR BLD AUTO: 1 %
ERYTHROCYTE [DISTWIDTH] IN BLOOD BY AUTOMATED COUNT: 14 % (ref 10–15)
HCT VFR BLD AUTO: 38.3 % (ref 35–47)
HGB BLD-MCNC: 12.4 G/DL (ref 11.7–15.7)
LYMPHOCYTES # BLD AUTO: 2 10E3/UL (ref 0.8–5.3)
LYMPHOCYTES NFR BLD AUTO: 29 %
MCH RBC QN AUTO: 29.4 PG (ref 26.5–33)
MCHC RBC AUTO-ENTMCNC: 32.4 G/DL (ref 31.5–36.5)
MCV RBC AUTO: 91 FL (ref 78–100)
MONOCYTES # BLD AUTO: 0.6 10E3/UL (ref 0–1.3)
MONOCYTES NFR BLD AUTO: 8 %
NEUTROPHILS # BLD AUTO: 4.2 10E3/UL (ref 1.6–8.3)
NEUTROPHILS NFR BLD AUTO: 61 %
PLATELET # BLD AUTO: 333 10E3/UL (ref 150–450)
RBC # BLD AUTO: 4.22 10E6/UL (ref 3.8–5.2)
WBC # BLD AUTO: 6.9 10E3/UL (ref 4–11)

## 2021-08-27 PROCEDURE — 87389 HIV-1 AG W/HIV-1&-2 AB AG IA: CPT | Performed by: NURSE PRACTITIONER

## 2021-08-27 PROCEDURE — 80053 COMPREHEN METABOLIC PANEL: CPT | Performed by: NURSE PRACTITIONER

## 2021-08-27 PROCEDURE — 36415 COLL VENOUS BLD VENIPUNCTURE: CPT | Performed by: NURSE PRACTITIONER

## 2021-08-27 PROCEDURE — 80061 LIPID PANEL: CPT | Performed by: NURSE PRACTITIONER

## 2021-08-27 PROCEDURE — 86803 HEPATITIS C AB TEST: CPT | Performed by: NURSE PRACTITIONER

## 2021-08-27 PROCEDURE — 99213 OFFICE O/P EST LOW 20 MIN: CPT | Mod: 25 | Performed by: NURSE PRACTITIONER

## 2021-08-27 PROCEDURE — 99396 PREV VISIT EST AGE 40-64: CPT | Performed by: NURSE PRACTITIONER

## 2021-08-27 PROCEDURE — 85025 COMPLETE CBC W/AUTO DIFF WBC: CPT | Performed by: NURSE PRACTITIONER

## 2021-08-27 RX ORDER — NIFEDIPINE 30 MG/1
30 TABLET, EXTENDED RELEASE ORAL DAILY
Qty: 90 TABLET | Refills: 3 | Status: SHIPPED | OUTPATIENT
Start: 2021-08-27 | End: 2023-01-12

## 2021-08-27 ASSESSMENT — ENCOUNTER SYMPTOMS
HEMATURIA: 0
CHILLS: 0
COUGH: 0
PALPITATIONS: 0
ABDOMINAL PAIN: 0
FREQUENCY: 0
SORE THROAT: 0
JOINT SWELLING: 0
DYSURIA: 0
DIARRHEA: 0
MYALGIAS: 0
WEAKNESS: 0
HEADACHES: 0
DIZZINESS: 0
NERVOUS/ANXIOUS: 0
HEARTBURN: 0
NAUSEA: 0
BREAST MASS: 0
FEVER: 0
PARESTHESIAS: 0
HEMATOCHEZIA: 0
SHORTNESS OF BREATH: 0
CONSTIPATION: 0
EYE PAIN: 0
ARTHRALGIAS: 0

## 2021-08-27 ASSESSMENT — MIFFLIN-ST. JEOR: SCORE: 1220.6

## 2021-08-27 ASSESSMENT — PAIN SCALES - GENERAL: PAINLEVEL: NO PAIN (0)

## 2021-08-27 NOTE — PROGRESS NOTES
SUBJECTIVE:   CC: Piyush Feldman is an 45 year old woman who presents for preventive health visit.   {  Patient has been advised of split billing requirements and indicates understanding: Yes  Healthy Habits:     Getting at least 3 servings of Calcium per day:  Yes    Bi-annual eye exam:  Yes    Dental care twice a year:  NO    Sleep apnea or symptoms of sleep apnea:  None    Diet:  Regular (no restrictions)    Frequency of exercise:  1 day/week    Duration of exercise:  15-30 minutes    Taking medications regularly:  Yes    Medication side effects:  Not applicable and None    PHQ-2 Total Score: 0    Additional concerns today:  No  Menses:  Regular, cramping, followed by GYN for menorrhagia.    Breast cancer screening:  Last mammogram 12/7/2020.  Has 2 sisters with breast cancer was seen for genetic counseling, should have breast mammogram alternating with mammogram yearly, due for mammogram.  Hypertension:  Taking nifedipine ER 30 mg daily.  /70's at home.  Denies headache, vision disturbance, BLE edema, chest pain/palpitations.  Today's PHQ-2 Score:   PHQ-2 ( 1999 Pfizer) 8/25/2021   Q1: Little interest or pleasure in doing things 0   Q2: Feeling down, depressed or hopeless 0   PHQ-2 Score 0   Q1: Little interest or pleasure in doing things Not at all   Q2: Feeling down, depressed or hopeless Not at all   PHQ-2 Score 0       Abuse: Current or Past (Physical, Sexual or Emotional) - No  Do you feel safe in your environment? Yes    Have you ever done Advance Care Planning? (For example, a Health Directive, POLST, or a discussion with a medical provider or your loved ones about your wishes): No, advance care planning information given to patient to review.  Patient declined advance care planning discussion at this time.    Social History     Tobacco Use     Smoking status: Never Smoker     Smokeless tobacco: Never Used   Substance Use Topics     Alcohol use: Never         Alcohol Use 8/25/2021   Prescreen:  >3 drinks/day or >7 drinks/week? No   Prescreen: >3 drinks/day or >7 drinks/week? -       Reviewed orders with patient.  Reviewed health maintenance and updated orders accordingly - Yes  Lab work is in process  BP Readings from Last 3 Encounters:   08/27/21 (!) 149/93   05/11/21 120/74   07/17/20 136/84    Wt Readings from Last 3 Encounters:   08/27/21 58.1 kg (128 lb)   05/11/21 57.7 kg (127 lb 1.6 oz)   07/17/20 54.4 kg (120 lb)                  Patient Active Problem List   Diagnosis     Cyst of left ovary     Family history of breast cancer in first degree relative     Benign essential hypertension     Past Surgical History:   Procedure Laterality Date     AS REMOVAL OF OVARIAN CYST(S) Left 2016       Social History     Tobacco Use     Smoking status: Never Smoker     Smokeless tobacco: Never Used   Substance Use Topics     Alcohol use: Never     Family History   Problem Relation Age of Onset     Breast Cancer Sister 46     Diabetes Father          Current Outpatient Medications   Medication Sig Dispense Refill     NIFEdipine ER OSMOTIC (PROCARDIA XL) 30 MG 24 hr tablet TAKE ONE TABLET BY MOUTH ONCE DAILY 90 tablet 0     norethindrone (AYGESTIN) 5 MG tablet Take 1 tablet (5 mg) by mouth daily 30 tablet 11       Breast Cancer Screening:  Any new diagnosis of family breast, ovarian, or bowel cancer? No    FHS-7: No flowsheet data found.    Mammogram Screening - Alternate mammogram schedule due to breast cancer history  Pertinent mammograms are reviewed under the imaging tab.    History of abnormal Pap smear: NO - age 30- 65 PAP every 3 years recommended  PAP / HPV Latest Ref Rng & Units 7/17/2020   PAP (Historical) - NIL   HPV16 NEG:Negative Negative   HPV18 NEG:Negative Negative   HRHPV NEG:Negative Negative     Reviewed and updated as needed this visit by clinical staff                 Reviewed and updated as needed this visit by Provider                    Review of Systems   Constitutional: Negative for chills  "and fever.   HENT: Negative for congestion, ear pain, hearing loss and sore throat.    Eyes: Negative for pain and visual disturbance.   Respiratory: Negative for cough and shortness of breath.    Cardiovascular: Negative for chest pain, palpitations and peripheral edema.   Gastrointestinal: Negative for abdominal pain, constipation, diarrhea, heartburn, hematochezia and nausea.   Breasts:  Negative for tenderness, breast mass and discharge.   Genitourinary: Negative for dysuria, frequency, genital sores, hematuria, pelvic pain, urgency, vaginal bleeding and vaginal discharge.   Musculoskeletal: Negative for arthralgias, joint swelling and myalgias.   Skin: Negative for rash.   Neurological: Negative for dizziness, weakness, headaches and paresthesias.   Psychiatric/Behavioral: Negative for mood changes. The patient is not nervous/anxious.       OBJECTIVE:   BP (!) 149/93 (BP Location: Right arm, Patient Position: Sitting, Cuff Size: Adult Large)   Pulse 81   Temp 98.2  F (36.8  C) (Oral)   Resp 18   Ht 1.626 m (5' 4\")   Wt 58.1 kg (128 lb)   LMP 08/04/2021   SpO2 99%   BMI 21.97 kg/m    Physical Exam  GENERAL: healthy, alert and no distress  EYES: Eyes grossly normal to inspection, PERRL and conjunctivae and sclerae normal  HENT: ear canals and TM's normal, nose and mouth without ulcers or lesions  NECK: no adenopathy, no asymmetry, masses, or scars and thyroid normal to palpation  RESP: lungs clear to auscultation - no rales, rhonchi or wheezes  BREAST: normal without masses, tenderness or nipple discharge and no palpable axillary masses or adenopathy  CV: regular rate and rhythm, normal S1 S2, no S3 or S4, no murmur, click or rub, no peripheral edema  ABDOMEN: soft, nontender, no hepatosplenomegaly, no masses and bowel sounds normal  MS: no gross musculoskeletal defects noted, no edema  SKIN: no suspicious lesions or rashes  NEURO: Normal strength and tone, mentation intact and speech normal  PSYCH: " "mentation appears normal, affect normal/bright      ASSESSMENT/PLAN:   Piyush was seen today for physical.    Diagnoses and all orders for this visit:    Routine general medical examination at a health care facility:-     CBC with Platelets & Differential; Future  -     Comprehensive metabolic panel; Future  -     Lipid panel reflex to direct LDL Fasting; Future  -     CBC with Platelets & Differential  -     Comprehensive metabolic panel  -     Lipid panel reflex to direct LDL Fasting    Benign essential hypertension:  /76, well controlled, refill below  -     NIFEdipine ER OSMOTIC (PROCARDIA XL) 30 MG 24 hr tablet; Take 1 tablet (30 mg) by mouth daily    Screening for HIV (human immunodeficiency virus)  -     HIV Antigen Antibody Combo; Future  -     HIV Antigen Antibody Combo    Need for hepatitis C screening test  -     Hepatitis C Screen Reflex to HCV RNA Quant and Genotype; Future  -     Hepatitis C Screen Reflex to HCV RNA Quant and Genotype    Family history of breast cancer in first degree relative:   is due to breast mammogram      Other orders  -     REVIEW OF HEALTH MAINTENANCE PROTOCOL ORDERS        Patient has been advised of split billing requirements and indicates understanding:   COUNSELING:  Reviewed preventive health counseling, as reflected in patient instructions       Regular exercise       Healthy diet/nutrition    Estimated body mass index is 21.99 kg/m  as calculated from the following:    Height as of 7/17/20: 1.619 m (5' 3.75\").    Weight as of 5/11/21: 57.7 kg (127 lb 1.6 oz).        She reports that she has never smoked. She has never used smokeless tobacco.      Counseling Resources:  ATP IV Guidelines  Pooled Cohorts Equation Calculator  Breast Cancer Risk Calculator  BRCA-Related Cancer Risk Assessment: FHS-7 Tool  FRAX Risk Assessment  ICSI Preventive Guidelines  Dietary Guidelines for Americans, 2010  USDA's MyPlate  ASA Prophylaxis  Lung CA Screening  Follow up in 1 year, " sooner as needed.  Susan Haase, APRN CNP  [facility]

## 2021-08-28 LAB
ALBUMIN SERPL-MCNC: 3.5 G/DL (ref 3.4–5)
ALP SERPL-CCNC: 69 U/L (ref 40–150)
ALT SERPL W P-5'-P-CCNC: 17 U/L (ref 0–50)
ANION GAP SERPL CALCULATED.3IONS-SCNC: 7 MMOL/L (ref 3–14)
AST SERPL W P-5'-P-CCNC: 14 U/L (ref 0–45)
BILIRUB SERPL-MCNC: 0.4 MG/DL (ref 0.2–1.3)
BUN SERPL-MCNC: 10 MG/DL (ref 7–30)
CALCIUM SERPL-MCNC: 8.6 MG/DL (ref 8.5–10.1)
CHLORIDE BLD-SCNC: 106 MMOL/L (ref 94–109)
CHOLEST SERPL-MCNC: 182 MG/DL
CO2 SERPL-SCNC: 24 MMOL/L (ref 20–32)
CREAT SERPL-MCNC: 0.56 MG/DL (ref 0.52–1.04)
FASTING STATUS PATIENT QL REPORTED: YES
GFR SERPL CREATININE-BSD FRML MDRD: >90 ML/MIN/1.73M2
GLUCOSE BLD-MCNC: 91 MG/DL (ref 70–99)
HCV AB SERPL QL IA: NONREACTIVE
HDLC SERPL-MCNC: 62 MG/DL
HIV 1+2 AB+HIV1 P24 AG SERPL QL IA: NONREACTIVE
LDLC SERPL CALC-MCNC: 108 MG/DL
NONHDLC SERPL-MCNC: 120 MG/DL
POTASSIUM BLD-SCNC: 3.6 MMOL/L (ref 3.4–5.3)
PROT SERPL-MCNC: 7.6 G/DL (ref 6.8–8.8)
SODIUM SERPL-SCNC: 137 MMOL/L (ref 133–144)
TRIGL SERPL-MCNC: 58 MG/DL

## 2021-10-10 ENCOUNTER — HEALTH MAINTENANCE LETTER (OUTPATIENT)
Age: 46
End: 2021-10-10

## 2022-01-09 ENCOUNTER — E-VISIT (OUTPATIENT)
Dept: FAMILY MEDICINE | Facility: CLINIC | Age: 47
End: 2022-01-09
Payer: COMMERCIAL

## 2022-01-09 DIAGNOSIS — J02.9 SORE THROAT: ICD-10-CM

## 2022-01-09 DIAGNOSIS — Z20.822 SUSPECTED COVID-19 VIRUS INFECTION: ICD-10-CM

## 2022-01-09 PROCEDURE — 99421 OL DIG E/M SVC 5-10 MIN: CPT | Performed by: NURSE PRACTITIONER

## 2022-01-10 NOTE — PATIENT INSTRUCTIONS
Jasana,    Your symptoms show that you may have coronavirus (COVID-19). This illness can cause fever, cough and trouble breathing. Many people get a mild case and get better on their own. Some people can get very sick.    Because you also reported sore throat, I would like to also test you for Strep Throat to determine if we need to treat you for that as well.    What should I do?  For all employees or close contacts (except [employer] and [employer] - see below), go to your [employer] home page and scroll down to the section that says  You have an appointment that needs to be scheduled  and click the large green button that says  Schedule Now  and follow the steps to find the next available opening.  It is important that when you are asked what the reason for your appointment is that you mention you need BOTH Covid and Strep tests.  tests.     If you are unable to complete these steps or if you cannot find any available times, please call [phone] to schedule employee testing.     [employer] employees or close contacts, please call [phone].   [place] ([employer]) employees or close contacts call [phone].    Return to work/school/ guidance:   Please let your workplace manager and staffing office know when your isolation ends.       If you receive a positive COVID-19 test result, follow the guidance of the those who are giving you the results. Usually the return to work is 10 days from symptom onset or positive test date (or in some cases 20 days if you are immunocompromised). If your symptoms started after your positive test, the 10 days should start when your symptoms started.   o If you work at [employer], you must also be cleared by Employee Occupational Health and Safety to return to work.      If you receive a negative COVID-19 test result and did not have a high risk exposure to someone with a known positive COVID-19 test, you can return to work once you're free of fever for 24 hours  without fever-reducing medication and your symptoms are improving or resolved.    If you receive a negative COVID-19 test and if you had a high risk exposure to someone who has tested positive for COVID-19 then you can return to work 14 days after your last contact with the positive individual. Follow quarantine guidance given by your doctor or public health officials.    Sign up for [employer].   We know it's scary to hear that you might have COVID-19. We want to track your symptoms to make sure you're okay over the next 2 weeks. Please look for an email from [employer]--this is a free, online program that we'll use to keep in touch. To sign up, follow the link in the email you will receive. Learn more at [email or web address]    How can I take care of myself?  Over the counter medications may help with your symptoms like congestion, cough, chills, or fever.    There are not many effective prescription treatments for early COVID-19. Hydroxychloroquine, ivermectin, and azithromycin are not effective or recommended for COVID-19.    If your symptoms started in the last 10 days, you may be able to receive a treatment with monoclonal antibodies. This treatment can lower your risk of severe illness and going to the hospital. It is given through an IV or under your skin (subcutaneous) and must be given at an infusion center. You must be 12 or older, weight at least 88 pounds, and have a positive COVID-19 test.      If you would like to sign up to be considered to receive the monoclonal antibody medicine, please complete a participation form through the [facility] here: MNRAP ([email or web address]). You may also call the [facility] COVID-19 Public Hotline at 1-361.858.4667 (open Mon-Fri: 9am-7pm and Sat: 10am-6pm).     Not all people who are eligible will receive the medicine, since supply is limited. You will be contacted in the next 1 to 2 business  days only if you are selected. If you do not receive a call, you have not been selected to receive the medicine. If you have any questions about this medication, please contact your primary care provider. For more information, see [email or web address]      Get lots of rest. Drink extra fluids (unless a doctor has told you not to)    Take Tylenol (acetaminophen) or ibuprofen for fever or pain. If you have liver or kidney problems, ask your family doctor if it's okay to take Tylenol or ibuprofen    Take over the counter medications for your symptoms, as directed by your doctor. You may also talk to your pharmacist.      If you have other health problems (like cancer, heart failure, an organ transplant or severe kidney disease): Call your specialty clinic if you don't feel better in the next 2 days.    Know when to call 911. Emergency warning signs include:  o Trouble breathing or shortness of breath  o Pain or pressure in the chest that doesn't go away  o Feeling confused like you haven't felt before, or not being able to wake up  o Bluish-colored lips or face    Where can I get more information?    [facility] - About COVID-19: [email or web address]     CDC - What to Do If You're Sick:  www.cdc.gov/coronavirus/2019-ncov/about/steps-when-sick.html    CDC - Ending Home Isolation:  https://www.cdc.gov/coronavirus/2019-ncov/your-health/quarantine-isolation.html     CDC - Caring for Someone:  www.cdc.gov/coronavirus/2019-ncov/if-you-are-sick/care-for-someone.html    [facility] clinical trials (COVID-19 research studies): clinicalaffairs.[facility]/n-clinical-trials    Below are the COVID-19 hotlines at the [facility] of [facility] ([facility]). Interpreters are available.  o For health questions: Call 185-621-6449 or 1-917.647.2547 (7 a.m. to 7 p.m.)  o For questions about schools and childcare: Call 435-728-6626 or 1-988.756.5152 (7 a.m. to 7 p.m.)

## 2022-01-12 ENCOUNTER — LAB (OUTPATIENT)
Dept: URGENT CARE | Facility: URGENT CARE | Age: 47
End: 2022-01-12
Attending: NURSE PRACTITIONER
Payer: COMMERCIAL

## 2022-01-12 DIAGNOSIS — J02.9 SORE THROAT: ICD-10-CM

## 2022-01-12 DIAGNOSIS — Z20.822 SUSPECTED COVID-19 VIRUS INFECTION: ICD-10-CM

## 2022-01-12 LAB
DEPRECATED S PYO AG THROAT QL EIA: NEGATIVE
GROUP A STREP BY PCR: NOT DETECTED

## 2022-01-12 PROCEDURE — U0005 INFEC AGEN DETEC AMPLI PROBE: HCPCS

## 2022-01-12 PROCEDURE — 87651 STREP A DNA AMP PROBE: CPT

## 2022-01-12 PROCEDURE — U0003 INFECTIOUS AGENT DETECTION BY NUCLEIC ACID (DNA OR RNA); SEVERE ACUTE RESPIRATORY SYNDROME CORONAVIRUS 2 (SARS-COV-2) (CORONAVIRUS DISEASE [COVID-19]), AMPLIFIED PROBE TECHNIQUE, MAKING USE OF HIGH THROUGHPUT TECHNOLOGIES AS DESCRIBED BY CMS-2020-01-R: HCPCS

## 2022-01-13 LAB — SARS-COV-2 RNA RESP QL NAA+PROBE: POSITIVE

## 2022-02-09 ENCOUNTER — ANCILLARY PROCEDURE (OUTPATIENT)
Dept: MAMMOGRAPHY | Facility: CLINIC | Age: 47
End: 2022-02-09
Attending: NURSE PRACTITIONER
Payer: COMMERCIAL

## 2022-02-09 DIAGNOSIS — Z12.31 VISIT FOR SCREENING MAMMOGRAM: ICD-10-CM

## 2022-02-09 PROCEDURE — 77067 SCR MAMMO BI INCL CAD: CPT | Mod: TC | Performed by: RADIOLOGY

## 2022-02-09 PROCEDURE — 77063 BREAST TOMOSYNTHESIS BI: CPT | Mod: TC | Performed by: RADIOLOGY

## 2022-02-17 PROBLEM — Z80.3 FAMILY HISTORY OF BREAST CANCER IN FIRST DEGREE RELATIVE: Status: ACTIVE | Noted: 2019-07-31

## 2022-09-18 ENCOUNTER — HEALTH MAINTENANCE LETTER (OUTPATIENT)
Age: 47
End: 2022-09-18

## 2022-11-07 ENCOUNTER — OFFICE VISIT (OUTPATIENT)
Dept: FAMILY MEDICINE | Facility: CLINIC | Age: 47
End: 2022-11-07
Payer: COMMERCIAL

## 2022-11-07 VITALS
WEIGHT: 132.7 LBS | DIASTOLIC BLOOD PRESSURE: 84 MMHG | OXYGEN SATURATION: 100 % | TEMPERATURE: 98.3 F | RESPIRATION RATE: 12 BRPM | BODY MASS INDEX: 22.65 KG/M2 | HEIGHT: 64 IN | SYSTOLIC BLOOD PRESSURE: 132 MMHG | HEART RATE: 98 BPM

## 2022-11-07 DIAGNOSIS — Z23 NEED FOR VACCINATION: ICD-10-CM

## 2022-11-07 DIAGNOSIS — Z00.00 ROUTINE GENERAL MEDICAL EXAMINATION AT A HEALTH CARE FACILITY: Primary | ICD-10-CM

## 2022-11-07 DIAGNOSIS — Z12.31 VISIT FOR SCREENING MAMMOGRAM: ICD-10-CM

## 2022-11-07 DIAGNOSIS — Z12.11 SCREEN FOR COLON CANCER: ICD-10-CM

## 2022-11-07 PROBLEM — N80.9 ENDOMETRIOSIS: Status: ACTIVE | Noted: 2022-11-07

## 2022-11-07 PROBLEM — N92.0 MENORRHAGIA: Status: ACTIVE | Noted: 2022-11-07

## 2022-11-07 LAB
ALBUMIN SERPL-MCNC: 3.9 G/DL (ref 3.4–5)
ALP SERPL-CCNC: 98 U/L (ref 40–150)
ALT SERPL W P-5'-P-CCNC: 35 U/L (ref 0–50)
ANION GAP SERPL CALCULATED.3IONS-SCNC: 4 MMOL/L (ref 3–14)
AST SERPL W P-5'-P-CCNC: 20 U/L (ref 0–45)
BILIRUB SERPL-MCNC: 0.5 MG/DL (ref 0.2–1.3)
BUN SERPL-MCNC: 12 MG/DL (ref 7–30)
CALCIUM SERPL-MCNC: 8.9 MG/DL (ref 8.5–10.1)
CHLORIDE BLD-SCNC: 104 MMOL/L (ref 94–109)
CHOLEST SERPL-MCNC: 219 MG/DL
CO2 SERPL-SCNC: 29 MMOL/L (ref 20–32)
CREAT SERPL-MCNC: 0.49 MG/DL (ref 0.52–1.04)
ERYTHROCYTE [DISTWIDTH] IN BLOOD BY AUTOMATED COUNT: 13 % (ref 10–15)
FASTING STATUS PATIENT QL REPORTED: YES
GFR SERPL CREATININE-BSD FRML MDRD: >90 ML/MIN/1.73M2
GLUCOSE BLD-MCNC: 108 MG/DL (ref 70–99)
HBA1C MFR BLD: 5.4 % (ref 0–5.6)
HCT VFR BLD AUTO: 40 % (ref 35–47)
HDLC SERPL-MCNC: 65 MG/DL
HGB BLD-MCNC: 12.8 G/DL (ref 11.7–15.7)
LDLC SERPL CALC-MCNC: 130 MG/DL
MCH RBC QN AUTO: 30.1 PG (ref 26.5–33)
MCHC RBC AUTO-ENTMCNC: 32 G/DL (ref 31.5–36.5)
MCV RBC AUTO: 94 FL (ref 78–100)
NONHDLC SERPL-MCNC: 154 MG/DL
PLATELET # BLD AUTO: 374 10E3/UL (ref 150–450)
POTASSIUM BLD-SCNC: 3.7 MMOL/L (ref 3.4–5.3)
PROT SERPL-MCNC: 7.8 G/DL (ref 6.8–8.8)
RBC # BLD AUTO: 4.25 10E6/UL (ref 3.8–5.2)
SODIUM SERPL-SCNC: 137 MMOL/L (ref 133–144)
TRIGL SERPL-MCNC: 121 MG/DL
WBC # BLD AUTO: 8.3 10E3/UL (ref 4–11)

## 2022-11-07 PROCEDURE — 90471 IMMUNIZATION ADMIN: CPT | Performed by: FAMILY MEDICINE

## 2022-11-07 PROCEDURE — 85027 COMPLETE CBC AUTOMATED: CPT | Performed by: FAMILY MEDICINE

## 2022-11-07 PROCEDURE — 90746 HEPB VACCINE 3 DOSE ADULT IM: CPT | Performed by: FAMILY MEDICINE

## 2022-11-07 PROCEDURE — 36415 COLL VENOUS BLD VENIPUNCTURE: CPT | Performed by: FAMILY MEDICINE

## 2022-11-07 PROCEDURE — 80053 COMPREHEN METABOLIC PANEL: CPT | Performed by: FAMILY MEDICINE

## 2022-11-07 PROCEDURE — 80061 LIPID PANEL: CPT | Performed by: FAMILY MEDICINE

## 2022-11-07 PROCEDURE — 83036 HEMOGLOBIN GLYCOSYLATED A1C: CPT | Performed by: FAMILY MEDICINE

## 2022-11-07 PROCEDURE — 99396 PREV VISIT EST AGE 40-64: CPT | Mod: 25 | Performed by: FAMILY MEDICINE

## 2022-11-07 RX ORDER — FERROUS SULFATE 325(65) MG
TABLET ORAL
COMMUNITY

## 2022-11-07 SDOH — ECONOMIC STABILITY: INCOME INSECURITY: HOW HARD IS IT FOR YOU TO PAY FOR THE VERY BASICS LIKE FOOD, HOUSING, MEDICAL CARE, AND HEATING?: NOT HARD AT ALL

## 2022-11-07 SDOH — HEALTH STABILITY: PHYSICAL HEALTH: ON AVERAGE, HOW MANY MINUTES DO YOU ENGAGE IN EXERCISE AT THIS LEVEL?: 0 MIN

## 2022-11-07 SDOH — ECONOMIC STABILITY: FOOD INSECURITY: WITHIN THE PAST 12 MONTHS, YOU WORRIED THAT YOUR FOOD WOULD RUN OUT BEFORE YOU GOT MONEY TO BUY MORE.: NEVER TRUE

## 2022-11-07 SDOH — ECONOMIC STABILITY: TRANSPORTATION INSECURITY
IN THE PAST 12 MONTHS, HAS THE LACK OF TRANSPORTATION KEPT YOU FROM MEDICAL APPOINTMENTS OR FROM GETTING MEDICATIONS?: NO

## 2022-11-07 SDOH — ECONOMIC STABILITY: TRANSPORTATION INSECURITY
IN THE PAST 12 MONTHS, HAS LACK OF TRANSPORTATION KEPT YOU FROM MEETINGS, WORK, OR FROM GETTING THINGS NEEDED FOR DAILY LIVING?: NO

## 2022-11-07 SDOH — ECONOMIC STABILITY: FOOD INSECURITY: WITHIN THE PAST 12 MONTHS, THE FOOD YOU BOUGHT JUST DIDN'T LAST AND YOU DIDN'T HAVE MONEY TO GET MORE.: NEVER TRUE

## 2022-11-07 SDOH — ECONOMIC STABILITY: INCOME INSECURITY: IN THE LAST 12 MONTHS, WAS THERE A TIME WHEN YOU WERE NOT ABLE TO PAY THE MORTGAGE OR RENT ON TIME?: NO

## 2022-11-07 SDOH — HEALTH STABILITY: PHYSICAL HEALTH: ON AVERAGE, HOW MANY DAYS PER WEEK DO YOU ENGAGE IN MODERATE TO STRENUOUS EXERCISE (LIKE A BRISK WALK)?: 0 DAYS

## 2022-11-07 ASSESSMENT — ENCOUNTER SYMPTOMS
HEADACHES: 0
SHORTNESS OF BREATH: 0
FREQUENCY: 0
JOINT SWELLING: 0
ABDOMINAL PAIN: 0
DIZZINESS: 0
FEVER: 0
CONSTIPATION: 0
EYE PAIN: 0
PALPITATIONS: 0
DYSURIA: 0
HEMATOCHEZIA: 0
CHILLS: 0
SORE THROAT: 0
WEAKNESS: 0
MYALGIAS: 0
DIARRHEA: 0
HEMATURIA: 0
ARTHRALGIAS: 0
NERVOUS/ANXIOUS: 0
PARESTHESIAS: 0
COUGH: 0
HEARTBURN: 0
BREAST MASS: 0
NAUSEA: 0

## 2022-11-07 ASSESSMENT — LIFESTYLE VARIABLES
HOW MANY STANDARD DRINKS CONTAINING ALCOHOL DO YOU HAVE ON A TYPICAL DAY: PATIENT DOES NOT DRINK
SKIP TO QUESTIONS 9-10: 1
AUDIT-C TOTAL SCORE: 0
HOW OFTEN DO YOU HAVE SIX OR MORE DRINKS ON ONE OCCASION: NEVER
HOW OFTEN DO YOU HAVE A DRINK CONTAINING ALCOHOL: NEVER

## 2022-11-07 ASSESSMENT — SOCIAL DETERMINANTS OF HEALTH (SDOH)
HOW OFTEN DO YOU GET TOGETHER WITH FRIENDS OR RELATIVES?: ONCE A WEEK
DO YOU BELONG TO ANY CLUBS OR ORGANIZATIONS SUCH AS CHURCH GROUPS UNIONS, FRATERNAL OR ATHLETIC GROUPS, OR SCHOOL GROUPS?: NO
IN A TYPICAL WEEK, HOW MANY TIMES DO YOU TALK ON THE PHONE WITH FAMILY, FRIENDS, OR NEIGHBORS?: TWICE A WEEK
HOW OFTEN DO YOU ATTEND CHURCH OR RELIGIOUS SERVICES?: MORE THAN 4 TIMES PER YEAR

## 2022-11-07 NOTE — PROGRESS NOTES
SUBJECTIVE:   CC: Piyush is an 47 year old who presents for preventive health visit.     Here for physical with labs.    Of note:  High blood pressure:  Seems controlled, but her heart rate will get elevated with activity, seems to be stress related, but can get as high as 137 without exercise.  Does move a lot, walk a lot, etc.    Patient has been advised of split billing requirements and indicates understanding: Yes  Healthy Habits:     Getting at least 3 servings of Calcium per day:  Yes    Bi-annual eye exam:  Yes    Dental care twice a year:  NO    Sleep apnea or symptoms of sleep apnea:  None    Diet:  Regular (no restrictions)    Frequency of exercise:  None    Taking medications regularly:  Yes    Medication side effects:  None    PHQ-2 Total Score: 0    Additional concerns today:  No        Today's PHQ-2 Score:   PHQ-2 ( 1999 Pfizer) 11/7/2022   Q1: Little interest or pleasure in doing things 0   Q2: Feeling down, depressed or hopeless 0   PHQ-2 Score 0   PHQ-2 Total Score (12-17 Years)- Positive if 3 or more points; Administer PHQ-A if positive -   Q1: Little interest or pleasure in doing things Not at all   Q2: Feeling down, depressed or hopeless Not at all   PHQ-2 Score 0       Abuse: Current or Past (Physical, Sexual or Emotional) - No  Do you feel safe in your environment? Yes        Social History     Tobacco Use     Smoking status: Never     Smokeless tobacco: Never   Substance Use Topics     Alcohol use: Never         Alcohol Use 11/7/2022   Prescreen: >3 drinks/day or >7 drinks/week? Not Applicable   Prescreen: >3 drinks/day or >7 drinks/week? -       Reviewed orders with patient.  Reviewed health maintenance and updated orders accordingly - Yes  Lab work is in process  Labs reviewed in EPIC  BP Readings from Last 3 Encounters:   11/07/22 132/84   08/27/21 134/76   05/11/21 120/74    Wt Readings from Last 3 Encounters:   11/07/22 60.2 kg (132 lb 11.2 oz)   08/27/21 58.1 kg (128 lb)   05/11/21  57.7 kg (127 lb 1.6 oz)                  Patient Active Problem List   Diagnosis     Cyst of left ovary     Family history of breast cancer in first degree relative     Benign essential hypertension     Endometriosis     Menorrhagia     Past Surgical History:   Procedure Laterality Date     AS REMOVAL OF OVARIAN CYST(S) Left 2016       Social History     Tobacco Use     Smoking status: Never     Smokeless tobacco: Never   Substance Use Topics     Alcohol use: Never     Family History   Problem Relation Age of Onset     Hypertension Mother      Diabetes Father      Hypertension Father      Breast Cancer Sister      Breast Cancer Sister      Hypertension Brother            Current Outpatient Medications   Medication Sig Dispense Refill     Ascorbic Acid (VITAMIN C PO)        Cyanocobalamin (VITAMIN B-12 PO)        NIFEdipine ER OSMOTIC (PROCARDIA XL) 30 MG 24 hr tablet Take 1 tablet (30 mg) by mouth daily 90 tablet 3     VITAMIN D PO        ferrous sulfate (FEROSUL) 325 (65 Fe) MG tablet        Multiple Vitamin (MULTIVITAMIN ADULT PO)          No Known Allergies     Recent Labs   Lab Test 08/27/21  1127 07/17/20  1608 07/31/19  0943   * 109* 106*   HDL 62 75 62   TRIG 58 126 117   ALT 17 24 16   CR 0.56 0.48* 0.50*   GFRESTIMATED >90 >90 >90   GFRESTBLACK  --  >90 >90   POTASSIUM 3.6 3.7 4.2   TSH  --  1.81 1.56        Breast Cancer Screening:    FHS-7:   Breast CA Risk Assessment (FHS-7) 2/9/2022   Did any of your first-degree relatives have breast or ovarian cancer? Yes   Did any of your relatives have bilateral breast cancer? Yes   Did any woman in your family have breast and ovarian cancer? No   Did any woman in your family have breast cancer before age 50 y? Yes   Do you have 2 or more relatives with breast and/or ovarian cancer? No   Do you have 2 or more relatives with breast and/or bowel cancer? No       Mammogram Screening: Recommended annual mammography  Pertinent mammograms are reviewed under the  "imaging tab.    History of abnormal Pap smear: NO - age 30-65 PAP every 5 years with negative HPV co-testing recommended  PAP / HPV Latest Ref Rng & Units 7/17/2020   PAP (Historical) - NIL   HPV16 NEG:Negative Negative   HPV18 NEG:Negative Negative   HRHPV NEG:Negative Negative     Reviewed and updated as needed this visit by clinical staff   Tobacco  Allergies  Meds  Problems  Med Hx  Surg Hx  Fam Hx  Soc   Hx        Reviewed and updated as needed this visit by Provider   Tobacco  Allergies  Meds  Problems  Med Hx  Surg Hx  Fam Hx         Past Medical History:   Diagnosis Date     Hypertension       Past Surgical History:   Procedure Laterality Date     AS REMOVAL OF OVARIAN CYST(S) Left 2016       Review of Systems   Constitutional: Negative for chills and fever.   HENT: Negative for congestion, ear pain, hearing loss and sore throat.    Eyes: Negative for pain and visual disturbance.   Respiratory: Negative for cough and shortness of breath.    Cardiovascular: Negative for chest pain, palpitations and peripheral edema.   Gastrointestinal: Negative for abdominal pain, constipation, diarrhea, heartburn, hematochezia and nausea.   Breasts:  Negative for tenderness, breast mass and discharge.   Genitourinary: Negative for dysuria, frequency, genital sores, hematuria, pelvic pain, urgency, vaginal bleeding and vaginal discharge.   Musculoskeletal: Negative for arthralgias, joint swelling and myalgias.   Skin: Negative for rash.   Neurological: Negative for dizziness, weakness, headaches and paresthesias.   Psychiatric/Behavioral: Negative for mood changes. The patient is not nervous/anxious.           OBJECTIVE:   /84 (BP Location: Right arm, Patient Position: Sitting, Cuff Size: Adult Regular)   Pulse 98   Temp 98.3  F (36.8  C) (Oral)   Resp 12   Ht 1.626 m (5' 4\")   Wt 60.2 kg (132 lb 11.2 oz)   LMP 10/28/2022 (Exact Date)   SpO2 100%   BMI 22.78 kg/m    Physical Exam  GENERAL: healthy, " "alert and no distress  EYES: Eyes grossly normal to inspection, PERRL and conjunctivae and sclerae normal  HENT: ear canals and TM's normal, nose and mouth without ulcers or lesions  NECK: no adenopathy, no asymmetry, masses, or scars and thyroid normal to palpation  RESP: lungs clear to auscultation - no rales, rhonchi or wheezes  BREAST: normal without masses, tenderness or nipple discharge and no palpable axillary masses or adenopathy  CV: regular rate and rhythm, normal S1 S2, no S3 or S4, no murmur, click or rub, no peripheral edema and peripheral pulses strong  ABDOMEN: soft, nontender, no hepatosplenomegaly, no masses and bowel sounds normal  MS: no gross musculoskeletal defects noted, no edema  SKIN: no suspicious lesions or rashes  NEURO: Normal strength and tone, mentation intact and speech normal  PSYCH: mentation appears normal, affect normal/bright    Diagnostic Test Results:  Labs reviewed in Epic    ASSESSMENT/PLAN:       ICD-10-CM    1. Routine general medical examination at a health care facility  Z00.00 Lipid panel reflex to direct LDL Fasting     Comprehensive metabolic panel (BMP + Alb, Alk Phos, ALT, AST, Total. Bili, TP)     CBC with platelets     Hemoglobin A1c      2. Screen for colon cancer  Z12.11 Colonoscopy Screening  Referral      3. Visit for screening mammogram  Z12.31 MA Screen Bilateral w/Roger      4. Need for vaccination  Z23 HEPATITIS B VACCINE,  ADULT (ENGERIX-B/RECOMBIVAX HB)          Patient has been advised of split billing requirements and indicates understanding: Yes      COUNSELING:  Reviewed preventive health counseling, as reflected in patient instructions    Estimated body mass index is 22.78 kg/m  as calculated from the following:    Height as of this encounter: 1.626 m (5' 4\").    Weight as of this encounter: 60.2 kg (132 lb 11.2 oz).        She reports that she has never smoked. She has never used smokeless tobacco.      Counseling Resources:  ATP IV " Guidelines  Pooled Cohorts Equation Calculator  Breast Cancer Risk Calculator  BRCA-Related Cancer Risk Assessment: FHS-7 Tool  FRAX Risk Assessment  ICSI Preventive Guidelines  Dietary Guidelines for Americans, 2010  USDA's MyPlate  ASA Prophylaxis  Lung CA Screening    April J. Lucero Lazaro MD  [facility]

## 2022-12-05 ENCOUNTER — ALLIED HEALTH/NURSE VISIT (OUTPATIENT)
Dept: FAMILY MEDICINE | Facility: CLINIC | Age: 47
End: 2022-12-05
Payer: COMMERCIAL

## 2022-12-05 DIAGNOSIS — Z23 NEED FOR VACCINATION: Primary | ICD-10-CM

## 2022-12-05 PROCEDURE — 99207 PR NO CHARGE NURSE ONLY: CPT

## 2022-12-05 PROCEDURE — 90746 HEPB VACCINE 3 DOSE ADULT IM: CPT

## 2022-12-05 PROCEDURE — 90471 IMMUNIZATION ADMIN: CPT

## 2022-12-13 ENCOUNTER — OFFICE VISIT (OUTPATIENT)
Dept: OBGYN | Facility: CLINIC | Age: 47
End: 2022-12-13
Payer: COMMERCIAL

## 2022-12-13 VITALS
BODY MASS INDEX: 23.39 KG/M2 | SYSTOLIC BLOOD PRESSURE: 128 MMHG | HEIGHT: 64 IN | WEIGHT: 137 LBS | DIASTOLIC BLOOD PRESSURE: 70 MMHG

## 2022-12-13 DIAGNOSIS — N94.6 DYSMENORRHEA: ICD-10-CM

## 2022-12-13 DIAGNOSIS — N83.202 CYST OF LEFT OVARY: Primary | ICD-10-CM

## 2022-12-13 DIAGNOSIS — Z87.42 HISTORY OF ENDOMETRIOSIS: ICD-10-CM

## 2022-12-13 PROCEDURE — 99396 PREV VISIT EST AGE 40-64: CPT | Performed by: OBSTETRICS & GYNECOLOGY

## 2022-12-13 PROCEDURE — 99213 OFFICE O/P EST LOW 20 MIN: CPT | Mod: 25 | Performed by: OBSTETRICS & GYNECOLOGY

## 2022-12-13 NOTE — NURSING NOTE
"Chief Complaint   Patient presents with     Gyn Exam       Initial /70 (BP Location: Right arm, Patient Position: Chair, Cuff Size: Adult Regular)   Ht 1.626 m (5' 4\")   Wt 62.1 kg (137 lb)   LMP 2022   Breastfeeding No   BMI 23.52 kg/m   Estimated body mass index is 23.52 kg/m  as calculated from the following:    Height as of this encounter: 1.626 m (5' 4\").    Weight as of this encounter: 62.1 kg (137 lb).  BP completed using cuff size: regular    Questioned patient about current smoking habits.  Pt. has never smoked.          The following HM Due: NONE    Suzi Fu CMA    "

## 2022-12-13 NOTE — PROGRESS NOTES
Piyush is a 47 year old  female who presents for annual gyn exam.     Menses are regular q 28-30 days and  lasting 5 days.  Menses flow: heavy.  Patient's last menstrual period was 2022.. Using condoms for contraception.  She is not currently considering pregnancy.  Besides routine health maintenance,  she would like to discuss ovarian cysts and heavy periods.  History of  x1 in  and a previous relationship  Currently using condoms for birth control  History of laparoscopic surgery in 2016 to remove a left endometrioma, had imaging in 2019 showing bilateral ovarian cysts possibly consistent with endometrioma, as well as a fibroid uterus.  States that currently her periods are so painful but sometimes they reach a maximum of 10 out of 10 pain and she needs to miss work due to the pain.  She most commonly manages the pain using Aleve taking 2 tabs every 8 hours for the first 1 to 2 days of her period.  She has previously been counseled on the use of norethindrone, which resulted in having abnormal bleeding for 3 to 4 weeks and so she self discontinued, partially due to her history of anemia due to heavy bleeding with periods.  Then after that she had a sister-in-law who saw an obstetrician in [place], who then she also saw and was recommended for hysterectomy, however she felt that it was too drastic step at that time and did not follow-up.  Today she is looking for some more options which might be more definitive and less bothersome than taking norethindrone, but not as aggressive as going to a full hysterectomy.  She is in a new relationship, they have been  since , however they are not planning on having any children together now that she is 47 years old.    GYNECOLOGIC HISTORY:  Piyush is sexually active with 1 male partner(s) and is currently in monogamous relationship.    History sexually transmitted infections:No STD history  STI testing offered?  Declined  History of abnormal  Pap smear: NO - age 30-65 PAP every 5 years with negative HPV co-testing recommended  Family history of breast CA: No  Family history of uterine/ovarian CA: No      HEALTH MAINTENANCE:  Mammo: 2022 . History of abnormal Mammo: No.    TSH: (  TSH   Date Value Ref Range Status   2020 1.81 0.40 - 4.00 mU/L Final    )  Pap; (  Lab Results   Component Value Date    PAP NIL 2020    )  PHQ2:   PHQ-2 (  [employer]) 2022   Q1: Little interest or pleasure in doing things 0 0   Q2: Feeling down, depressed or hopeless 0 0   PHQ-2 Score 0 0   PHQ-2 Total Score (12-17 Years)- Positive if 3 or more points; Administer PHQ-A if positive - -   Q1: Little interest or pleasure in doing things - Not at all   Q2: Feeling down, depressed or hopeless - Not at all   PHQ-2 Score - 0         HISTORY:  OB History    Para Term  AB Living   1 1 1 0 0 1   SAB IAB Ectopic Multiple Live Births   0 0 0 0 1      # Outcome Date GA Lbr Ronnell/2nd Weight Sex Delivery Anes PTL Lv   1 Term 02    F    MENA     Past Medical History:   Diagnosis Date     Hypertension      Past Surgical History:   Procedure Laterality Date     AS REMOVAL OF OVARIAN CYST(S) Left      Family History   Problem Relation Age of Onset     Hypertension Mother      Diabetes Father      Hypertension Father      Breast Cancer Sister      Breast Cancer Sister      Hypertension Brother      Social History     Socioeconomic History     Marital status:      Spouse name: None     Number of children: None     Years of education: None     Highest education level: None   Tobacco Use     Smoking status: Never     Smokeless tobacco: Never   Vaping Use     Vaping Use: Never used   Substance and Sexual Activity     Alcohol use: Never     Drug use: Never     Sexual activity: Yes     Partners: Male     Birth control/protection: Condom   Other Topics Concern     Parent/sibling w/ CABG, MI or angioplasty before 65F 55M? No     Social  "Determinants of Health     Financial Resource Strain: Low Risk      Difficulty of Paying Living Expenses: Not hard at all   Food Insecurity: No Food Insecurity     Worried About Running Out of Food in the Last Year: Never true     Ran Out of Food in the Last Year: Never true   Transportation Needs: No Transportation Needs     Lack of Transportation (Medical): No     Lack of Transportation (Non-Medical): No   Physical Activity: Inactive     Days of Exercise per Week: 0 days     Minutes of Exercise per Session: 0 min   Stress: No Stress Concern Present     Feeling of Stress : Not at all   Social Connections: Moderately Integrated     Frequency of Communication with Friends and Family: Twice a week     Frequency of Social Gatherings with Friends and Family: Once a week     Attends Anabaptism Services: More than 4 times per year     Active Member of Clubs or Organizations: No     Marital Status:    Housing Stability: Low Risk      Unable to Pay for Housing in the Last Year: No     Number of Places Lived in the Last Year: 2     Unstable Housing in the Last Year: No       Current Outpatient Medications:      Ascorbic Acid (VITAMIN C PO), , Disp: , Rfl:      Cyanocobalamin (VITAMIN B-12 PO), , Disp: , Rfl:      ferrous sulfate (FEROSUL) 325 (65 Fe) MG tablet, , Disp: , Rfl:      Multiple Vitamin (MULTIVITAMIN ADULT PO), , Disp: , Rfl:      NIFEdipine ER OSMOTIC (PROCARDIA XL) 30 MG 24 hr tablet, Take 1 tablet (30 mg) by mouth daily, Disp: 90 tablet, Rfl: 3     VITAMIN D PO, , Disp: , Rfl:    No Known Allergies    Past medical, surgical, social and family history were reviewed and updated in EPIC.    EXAM:  /70 (BP Location: Right arm, Patient Position: Chair, Cuff Size: Adult Regular)   Ht 1.626 m (5' 4\")   Wt 62.1 kg (137 lb)   LMP 11/23/2022   Breastfeeding No   BMI 23.52 kg/m     BMI: Body mass index is 23.52 kg/m .  Constitutional: healthy, alert and no distress  Breast: symmetrical and No nodularity, " asymmetry or nipple discharge bilaterally.  Gastrointestinal: Abdomen soft, non-tender, non-distended. No masses, organomegaly.  :  Vulva:  No external lesions, normal female hair distribution, no inguinal adenopathy.    Urethra:  Midline, non-tender, well supported, no discharge  Vagina:  Moist, pink, no abnormal discharge, no lesions  Uterus:  Slightly enlarged with a fullness posteriorly , non-tender, mobile  Ovaries:  Mild tenderness on bilateral adnexa, mobile  Rectal Exam: deferred  Musculoskeletal: extremities normal  Skin: no suspicious lesions or rashes  Psychiatric: Affect appropriate, cooperative,mentation appears normal.     COUNSELING:   Reviewed preventive health counseling, as reflected in patient instructions       Contraception       Family planning       (Stephanie)menopause management   reports that she has never smoked. She has never used smokeless tobacco.    Body mass index is 23.52 kg/m .    FRAX Risk Assessment    ASSESSMENT:  47 year old female with satisfactory annual exam, and with several other gyn complaints.     1. Cyst of left ovary  See #2  - US Pelvic Transabdominal and Transvaginal; Future    2. History of endometriosis  Reviewed dysmenorrhea and ovarian cysts, reviewed her history of painful periods as well as treatment measures for these symptoms.  Could consider hormonal mgmt, surgical mgmt with excision/fulguration and removal of ovarian cysts (and could combine with robot myomectomy, see #3), lupron, hysterectomy with BSO.  Options discussed in detail, questions answered.  She will return after pelvic US to review images and make a plan.   - US Pelvic Transabdominal and Transvaginal; Future    3. Dysmenorrhea and fibroids  Discussed dx and options for mgmt of fibroids.  Fibroids are a benign condition of overgrowth of the muscle cells of the uterus.  They can cause problems when they contribute to heavy bleeding, dysmenorrhea, or bulk symptoms.  There are several treatment  strategies beginning with medical mgmt (OCP, ppx NSAIDs, lupron, other newer hormone suppressive medications with add-back therapy, even LNG IUD can be helpful with certain symptoms), non invasive surgical options (UFE, hysteroscopic myomectomy), more advanced surgical options (RFA, abdominal myomectomy) or definitive treatment (hysterectomy).  Fibroids tend to be hormonally responsive, so can grow throughout the reproductive life span, but tend to become quiescent and can even shrink in menopause.  She will RTC in 1-2 months after discussing these options with her family and getting a follow-up ultrasound.   - US Pelvic Transabdominal and Transvaginal; Future     Ce Rodríguez MD    Total time spent discussing dx 1-3 apart from a normal preventive pelvic and breast exam was 25 minutes; this includes pre-work time, intra-service time, and post-work time including time spent on documentation which occurred on the date of service.

## 2022-12-13 NOTE — PROGRESS NOTES
"  SUBJECTIVE:                                                   CC:  Patient presents with:  Gyn Exam      HPI:  Piyush Feldman is a 47 year old      Gyn History:  Patient's last menstrual period was 2022.       Using {PLC CONTRACEPTION:918638} for contraception.    Last 3 Pap and HPV Results:   PAP / HPV Latest Ref Rng & Units 2020   PAP (Historical) - NIL   HPV16 NEG:Negative Negative   HPV18 NEG:Negative Negative   HRHPV NEG:Negative Negative       PMH, PSH, Soc Hx, Fam Hx, Meds, and allergies reviewed in Epic.    OBJECTIVE:     /70 (BP Location: Right arm, Patient Position: Chair, Cuff Size: Adult Regular)   Ht 1.626 m (5' 4\")   Wt 62.1 kg (137 lb)   LMP 2022   Breastfeeding No   BMI 23.52 kg/m      Gen: Healthy appearing *** female, no acute distress, comfortable  Psych: mentation appears normal and affect bright  : Normal external female genitalia.  No external lesions.   SSE: Speculum exam reveals vaginal epithelium well rugated with normal physiologic discharge. Cervix appears smooth, pink, with no visible lesions.     Test Results:  No results found for this or any previous visit (from the past 24 hour(s)).    ASSESSMENT/PLAN:                                                      1. Cyst of left ovary  ***  - US Pelvic Transabdominal and Transvaginal; Future    2. History of endometriosis  ***  - US Pelvic Transabdominal and Transvaginal; Future    3. Dysmenorrhea  ***  - US Pelvic Transabdominal and Transvaginal; Future        Ce Rodríguez MD, MPH  Obstetrics and Gynecology    "

## 2023-01-10 ENCOUNTER — ANCILLARY PROCEDURE (OUTPATIENT)
Dept: ULTRASOUND IMAGING | Facility: CLINIC | Age: 48
End: 2023-01-10
Attending: OBSTETRICS & GYNECOLOGY
Payer: COMMERCIAL

## 2023-01-10 DIAGNOSIS — Z87.42 HISTORY OF ENDOMETRIOSIS: ICD-10-CM

## 2023-01-10 DIAGNOSIS — I10 BENIGN ESSENTIAL HYPERTENSION: ICD-10-CM

## 2023-01-10 DIAGNOSIS — N83.202 CYST OF LEFT OVARY: ICD-10-CM

## 2023-01-10 DIAGNOSIS — N94.6 DYSMENORRHEA: ICD-10-CM

## 2023-01-10 PROCEDURE — 76830 TRANSVAGINAL US NON-OB: CPT | Performed by: OBSTETRICS & GYNECOLOGY

## 2023-01-10 PROCEDURE — 76856 US EXAM PELVIC COMPLETE: CPT | Performed by: OBSTETRICS & GYNECOLOGY

## 2023-01-12 RX ORDER — NIFEDIPINE 30 MG/1
30 TABLET, EXTENDED RELEASE ORAL DAILY
Qty: 90 TABLET | Refills: 3 | Status: SHIPPED | OUTPATIENT
Start: 2023-01-12 | End: 2024-04-18

## 2023-01-12 NOTE — TELEPHONE ENCOUNTER
Prescription approved per [facility] Refill Protocol.    Belia Downing RN   PAL (Patient [facility]ocate [facility])  [facility]

## 2023-01-17 ENCOUNTER — OFFICE VISIT (OUTPATIENT)
Dept: OBGYN | Facility: CLINIC | Age: 48
End: 2023-01-17
Payer: COMMERCIAL

## 2023-01-17 VITALS
BODY MASS INDEX: 23.22 KG/M2 | HEIGHT: 64 IN | WEIGHT: 136 LBS | DIASTOLIC BLOOD PRESSURE: 78 MMHG | SYSTOLIC BLOOD PRESSURE: 118 MMHG

## 2023-01-17 DIAGNOSIS — D25.9 UTERINE LEIOMYOMA, UNSPECIFIED LOCATION: ICD-10-CM

## 2023-01-17 DIAGNOSIS — N94.6 DYSMENORRHEA: Primary | ICD-10-CM

## 2023-01-17 PROCEDURE — 99214 OFFICE O/P EST MOD 30 MIN: CPT | Performed by: OBSTETRICS & GYNECOLOGY

## 2023-01-17 RX ORDER — NORGESTIMATE AND ETHINYL ESTRADIOL 0.25-0.035
1 KIT ORAL DAILY
Qty: 84 TABLET | Refills: 4 | Status: SHIPPED | OUTPATIENT
Start: 2023-01-17 | End: 2024-05-17

## 2023-01-17 NOTE — PROGRESS NOTES
"  SUBJECTIVE:                                                   CC:  Patient presents with:  Follow Up: U/S 1/10 Myomata      HPI:  Piyush Feldman is a 47 year old  with a history of uterine fibroids, endometrioma, dysmenorrhea, and a recent ultrasound showing a 4.6 cm posterior fibroid as well as a left simple ovarian cyst measuring 3.6 cm who presents to discuss management.    Her primary complaint is the symptom of pain.  Her cycles are accompanied by 10 out of 10 pain, which can be debilitating and keep her home from work.  She has used Aleve with great success to manage this pain in the past.  She was previously counseled for hysterectomy by a different gynecologist, as she is completed childbearing, but she felt it was a drastic step at that time, and wants to discuss other options.  She has previously been on norethindrone, but did not love this medication, and would like to try something different.    Gyn History:  Patient's last menstrual period was 2022 (exact date).        Last 3 Pap and HPV Results:   PAP / HPV Latest Ref Rng & Units 2020   PAP (Historical) - NIL   HPV16 NEG:Negative Negative   HPV18 NEG:Negative Negative   HRHPV NEG:Negative Negative       PMH, PSH, Soc Hx, Fam Hx, Meds, and allergies reviewed in Epic.    OBJECTIVE:     /78 (BP Location: Left arm, Patient Position: Chair, Cuff Size: Adult Regular)   Ht 1.626 m (5' 4\")   Wt 61.7 kg (136 lb)   LMP 2022 (Exact Date)   Breastfeeding No   BMI 23.34 kg/m      Gen: Healthy appearing female, no acute distress, comfortable  Psych: mentation appears normal and affect bright    Test Results:  [facility] Obstetric[facility] Gynecology     ULTRASOUND - PELVIC GYN- Transabdominal and Transvaginal     Referring MD: Ce Rodríguez MD     ===================================     CLINICAL INFORMATION     Indications for ultrasound:   Hx - endometriosis, dysmenorrhea, ovarian cysts     LMP: 2022 "    Hormones: none     Measurements:  Uterus: 8.9 x 8.2 x 7.2 cm     Position is retroverted.  Contour is irregular w/ myomata:   1) posterior 4.6 x 3.8 cm, 2) posterior 2.2 x 1.9 cm     Endo cav: 8.5 mm       Distorted  Cervix: wnl     Right ovary: 2.9 x 2.4 x 2.2 cm  Wnl  Left ovary: 4.4 x 3.2 x 4.2 cm Simple cyst 3.6 x 2.2 x 2.8cm     Cul de sac: no free fluid     ===================================  Impression:     1. Fibroid uterus.  Largest fibroid measures 4.6cm in greatest dimension and is located posteriorly.   2. Endometrial cavity slightly distorted and difficult to view due to shadowing from fibroids.    2. Left ovarian hypoechoic cyst, appearing functional and likely benign.  To ensure resolution or evaluate for growth of this ovarian cyst, consider repeat ultrasound in 6-8 weeks.       ASSESSMENT/PLAN:                                                      1. Dysmenorrhea  We discussed prophylactic use of NSAIDs for dysmenorrhea and fibroid pain.  We had an extensive discussion of management for fibroids (see #2).  Ultimately she elected for management of her symptoms with continuous OCP, but she does think that she may want to hysterectomy within a couple of years if her symptoms have not significantly improved on this medication.  - norgestimate-ethinyl estradiol (ORTHO-CYCLEN) 0.25-35 MG-MCG tablet; Take 1 tablet by mouth daily Skip the placebo weeks for the first two months  Dispense: 84 tablet; Refill: 4    2. Uterine leiomyoma, unspecified location  Discussed dx and options for mgmt of fibroids.  Fibroids are a benign condition of overgrowth of the muscle cells of the uterus.  They can cause problems when they contribute to heavy bleeding, dysmenorrhea, or bulk symptoms.  There are several treatment strategies beginning with medical mgmt (OCP, ppx NSAIDs, lupron, other newer hormone suppressive medications with add-back therapy, even LNG IUD can be helpful with certain symptoms), non invasive  surgical options (UFE, hysteroscopic myomectomy), more advanced surgical options (RFA, abdominal myomectomy) or definitive treatment (hysterectomy).  Fibroids tend to be hormonally responsive, so can grow throughout the reproductive life span, but tend to become quiescent and can even shrink in menopause.    She has had all of her questions answered today, and will follow up as needed.      Ce Rodríguez MD, MPH  Obstetrics and Gynecology

## 2023-02-21 ENCOUNTER — ANCILLARY PROCEDURE (OUTPATIENT)
Dept: MAMMOGRAPHY | Facility: CLINIC | Age: 48
End: 2023-02-21
Attending: FAMILY MEDICINE
Payer: COMMERCIAL

## 2023-02-21 DIAGNOSIS — Z12.31 VISIT FOR SCREENING MAMMOGRAM: ICD-10-CM

## 2023-02-21 PROCEDURE — 77067 SCR MAMMO BI INCL CAD: CPT | Mod: TC | Performed by: STUDENT IN AN ORGANIZED HEALTH CARE EDUCATION/TRAINING PROGRAM

## 2023-02-21 PROCEDURE — 77063 BREAST TOMOSYNTHESIS BI: CPT | Mod: TC | Performed by: STUDENT IN AN ORGANIZED HEALTH CARE EDUCATION/TRAINING PROGRAM

## 2023-02-28 ENCOUNTER — HOSPITAL ENCOUNTER (OUTPATIENT)
Dept: ULTRASOUND IMAGING | Facility: CLINIC | Age: 48
Discharge: HOME | End: 2023-02-28
Attending: FAMILY MEDICINE
Payer: COMMERCIAL

## 2023-02-28 ENCOUNTER — HOSPITAL ENCOUNTER (OUTPATIENT)
Dept: MAMMOGRAPHY | Facility: CLINIC | Age: 48
Discharge: HOME | End: 2023-02-28
Attending: FAMILY MEDICINE
Payer: COMMERCIAL

## 2023-02-28 DIAGNOSIS — R92.8 ABNORMAL MAMMOGRAM: ICD-10-CM

## 2023-02-28 PROCEDURE — 77061 BREAST TOMOSYNTHESIS UNI: CPT | Mod: RT

## 2023-02-28 PROCEDURE — 76642 ULTRASOUND BREAST LIMITED: CPT | Mod: RT

## 2023-04-13 ENCOUNTER — TELEPHONE (OUTPATIENT)
Dept: FAMILY MEDICINE | Facility: CLINIC | Age: 48
End: 2023-04-13

## 2023-04-13 NOTE — TELEPHONE ENCOUNTER
Patient Quality Outreach    Patient is due for the following:   Colon Cancer Screening    Next Steps:   Schedule a office visit for colorectal screening    Type of outreach:    Sent deCarta message.    Next Steps:  Reach out within 90 days via Phone and Letter.    Max number of attempts reached: No. Will try again in 90 days if patient still on fail list.    Questions for provider review:    None     Adrianne Mendoza          Patient Quality Outreach Summary      Summary:    Patient is due/failing the following:   Colonoscopy    Type of outreach:    Sent letter.    Questions for provider review:    None                                                                                                                    Adrianne Mendoza CMA on 6/2/2023 at 2:27 PM

## 2023-04-13 NOTE — LETTER
June 2, 2023      Piyush Feldman  [id number] [address] [facility] [id number]        Dear Piyush,       Our records indicate that you have not scheduled for a(an) appointment with your provider for a  Colon Cancer Screening which was recommended by your health care provider.     If you have received your health care elsewhere, please call the clinic so the information can be documented in your chart and name can be removed from the reminder list.     Please call [phone] to schedule your appointment with your primary care provider         Thank you for your understanding.     Sincerely,      [facility]

## 2023-04-30 ENCOUNTER — HOSPITAL ENCOUNTER (EMERGENCY)
Facility: CLINIC | Age: 48
Discharge: HOME | End: 2023-04-30
Attending: EMERGENCY MEDICINE | Admitting: EMERGENCY MEDICINE
Payer: COMMERCIAL

## 2023-04-30 VITALS
WEIGHT: 130 LBS | DIASTOLIC BLOOD PRESSURE: 76 MMHG | SYSTOLIC BLOOD PRESSURE: 142 MMHG | TEMPERATURE: 98 F | RESPIRATION RATE: 20 BRPM | HEIGHT: 64 IN | OXYGEN SATURATION: 99 % | HEART RATE: 98 BPM | BODY MASS INDEX: 22.2 KG/M2

## 2023-04-30 DIAGNOSIS — R07.89 CHEST TIGHTNESS: ICD-10-CM

## 2023-04-30 PROCEDURE — 93005 ELECTROCARDIOGRAM TRACING: CPT

## 2023-04-30 PROCEDURE — 99283 EMERGENCY DEPT VISIT LOW MDM: CPT

## 2023-04-30 ASSESSMENT — ENCOUNTER SYMPTOMS
CHEST TIGHTNESS: 1
MYALGIAS: 1

## 2023-05-01 LAB
ATRIAL RATE - MUSE: 97 BPM
DIASTOLIC BLOOD PRESSURE - MUSE: NORMAL MMHG
INTERPRETATION ECG - MUSE: NORMAL
P AXIS - MUSE: 46 DEGREES
PR INTERVAL - MUSE: 128 MS
QRS DURATION - MUSE: 76 MS
QT - MUSE: 360 MS
QTC - MUSE: 457 MS
R AXIS - MUSE: -19 DEGREES
SYSTOLIC BLOOD PRESSURE - MUSE: NORMAL MMHG
T AXIS - MUSE: 37 DEGREES
VENTRICULAR RATE- MUSE: 97 BPM
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
## 2023-05-02 ENCOUNTER — E-VISIT (OUTPATIENT)
Dept: FAMILY MEDICINE | Facility: CLINIC | Age: 48
End: 2023-05-02
Payer: COMMERCIAL
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
## 2023-05-02 DIAGNOSIS — M79.10 MUSCLE PAIN: Primary | ICD-10-CM

## 2023-05-02 PROCEDURE — 99421 OL DIG E/M SVC 5-10 MIN: CPT | Performed by: FAMILY MEDICINE

## 2023-05-02 NOTE — PATIENT INSTRUCTIONS
Thank you for choosing us for your care. Based on your symptoms and length of illness, I do not think that you need a prescription at this time.  Please follow the care advise I've provided and use the over the counter medications to help relieve your symptoms.   View your full visit summary for details by clicking on the link below.     If you're not feeling better within 2-3 days, please respond to this message and we can consider if a prescription is needed.  You can schedule an appointment right here in [facility], or call [phone]  If the visit is for the same symptoms as your eVisit, we'll refund the cost of your eVisit if seen within seven days.
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
## 2023-05-02 NOTE — TELEPHONE ENCOUNTER
Provider E-Visit time total (minutes): 9 minutes    Conservative management recommended with OTC analgesics PRN and heating pad.     Excuse from work letter written.    Follow up as needed.

## 2023-05-05 ENCOUNTER — ALLIED HEALTH/NURSE VISIT (OUTPATIENT)
Dept: FAMILY MEDICINE | Facility: CLINIC | Age: 48
End: 2023-05-05
Payer: COMMERCIAL

## 2023-05-05 DIAGNOSIS — Z23 NEED FOR VACCINATION: Primary | ICD-10-CM

## 2023-05-05 PROCEDURE — 90746 HEPB VACCINE 3 DOSE ADULT IM: CPT

## 2023-05-05 PROCEDURE — 90471 IMMUNIZATION ADMIN: CPT

## 2023-05-05 PROCEDURE — 99207 PR NO CHARGE NURSE ONLY: CPT

## 2023-05-05 NOTE — PROGRESS NOTES
Prior to immunization administration, verified patients identity using patient s name and date of birth. Please see Immunization Activity for additional information.     Screening Questionnaire for Adult Immunization    Are you sick today?   No   Do you have allergies to medications, food, a vaccine component or latex?   No   Have you ever had a serious reaction after receiving a vaccination?   No   Do you have a long-term health problem with heart, lung, kidney, or metabolic disease (e.g., diabetes), asthma, a blood disorder, no spleen, complement component deficiency, a cochlear implant, or a spinal fluid leak?  Are you on long-term aspirin therapy?   No   Do you have cancer, leukemia, HIV/AIDS, or any other immune system problem?   No   Do you have a parent, brother, or sister with an immune system problem?   No   In the past 3 months, have you taken medications that affect  your immune system, such as prednisone, other steroids, or anticancer drugs; drugs for the treatment of rheumatoid arthritis, Crohn s disease, or psoriasis; or have you had radiation treatments?   No   Have you had a seizure, or a brain or other nervous system problem?   No   During the past year, have you received a transfusion of blood or blood    products, or been given immune (gamma) globulin or antiviral drug?   No   For women: Are you pregnant or is there a chance you could become       pregnant during the next month?   No   Have you received any vaccinations in the past 4 weeks?   No     Immunization questionnaire answers were all negative.    I have reviewed the following standing orders:   This patient is due and qualifies for the Hepatitis B vaccine.    Click here for Hepatitis B Standing Order    I have reviewed the vaccines inclusion and exclusion criteria; No concerns regarding eligibility.         Injection of Hepatitis B given by Nolvia Lopez MA. Patient instructed to remain in clinic for 15 minutes afterwards, and to report any  adverse reactions.     Screening performed by Nolvia Lopez MA on 5/5/2023 at 9:18 AM.

## 2023-11-13 ENCOUNTER — PATIENT OUTREACH (OUTPATIENT)
Dept: CARE COORDINATION | Facility: CLINIC | Age: 48
End: 2023-11-13
Payer: COMMERCIAL

## 2023-11-27 ENCOUNTER — PATIENT OUTREACH (OUTPATIENT)
Dept: CARE COORDINATION | Facility: CLINIC | Age: 48
End: 2023-11-27
Payer: COMMERCIAL

## 2023-12-28 ENCOUNTER — PATIENT OUTREACH (OUTPATIENT)
Dept: CARE COORDINATION | Facility: CLINIC | Age: 48
End: 2023-12-28
Payer: COMMERCIAL

## 2024-01-11 ENCOUNTER — PATIENT OUTREACH (OUTPATIENT)
Dept: CARE COORDINATION | Facility: CLINIC | Age: 49
End: 2024-01-11
Payer: COMMERCIAL

## 2024-01-29 ENCOUNTER — PATIENT OUTREACH (OUTPATIENT)
Dept: CARE COORDINATION | Facility: CLINIC | Age: 49
End: 2024-01-29
Payer: COMMERCIAL

## 2024-02-25 ENCOUNTER — HEALTH MAINTENANCE LETTER (OUTPATIENT)
Age: 49
End: 2024-02-25

## 2024-02-26 ENCOUNTER — ANCILLARY PROCEDURE (OUTPATIENT)
Dept: MAMMOGRAPHY | Facility: CLINIC | Age: 49
End: 2024-02-26
Attending: FAMILY MEDICINE
Payer: COMMERCIAL

## 2024-02-26 DIAGNOSIS — Z12.31 VISIT FOR SCREENING MAMMOGRAM: ICD-10-CM

## 2024-02-26 PROCEDURE — 77063 BREAST TOMOSYNTHESIS BI: CPT | Mod: TC | Performed by: RADIOLOGY

## 2024-02-26 PROCEDURE — 77067 SCR MAMMO BI INCL CAD: CPT | Mod: TC | Performed by: RADIOLOGY

## 2024-03-04 ENCOUNTER — HOSPITAL ENCOUNTER (OUTPATIENT)
Dept: ULTRASOUND IMAGING | Facility: CLINIC | Age: 49
Discharge: HOME | End: 2024-03-04
Attending: FAMILY MEDICINE
Payer: COMMERCIAL

## 2024-03-04 DIAGNOSIS — R92.8 ABNORMAL MAMMOGRAM: ICD-10-CM

## 2024-03-04 PROCEDURE — 76642 ULTRASOUND BREAST LIMITED: CPT | Mod: 50

## 2024-03-11 ENCOUNTER — HOSPITAL ENCOUNTER (OUTPATIENT)
Dept: ULTRASOUND IMAGING | Facility: CLINIC | Age: 49
Discharge: HOME | End: 2024-03-11
Attending: FAMILY MEDICINE
Payer: COMMERCIAL

## 2024-03-11 ENCOUNTER — HOSPITAL ENCOUNTER (OUTPATIENT)
Dept: MAMMOGRAPHY | Facility: CLINIC | Age: 49
Discharge: HOME | End: 2024-03-11
Attending: FAMILY MEDICINE
Payer: COMMERCIAL

## 2024-03-11 DIAGNOSIS — R92.8 ABNORMAL MAMMOGRAM: ICD-10-CM

## 2024-03-11 PROCEDURE — 88305 TISSUE EXAM BY PATHOLOGIST: CPT | Mod: TC | Performed by: FAMILY MEDICINE

## 2024-03-11 PROCEDURE — 88305 TISSUE EXAM BY PATHOLOGIST: CPT | Mod: 26 | Performed by: STUDENT IN AN ORGANIZED HEALTH CARE EDUCATION/TRAINING PROGRAM

## 2024-03-11 PROCEDURE — 999N000065 MA POST PROCEDURE RIGHT

## 2024-03-11 PROCEDURE — 272N000615 US BREAST BIOPSY CORE NEEDLE RIGHT

## 2024-03-11 PROCEDURE — 250N000009 HC RX 250: Performed by: RADIOLOGY

## 2024-03-11 RX ADMIN — LIDOCAINE HYDROCHLORIDE 5 ML: 10 INJECTION, SOLUTION EPIDURAL; INFILTRATION; INTRACAUDAL; PERINEURAL at 13:08

## 2024-03-11 NOTE — DISCHARGE INSTRUCTIONS

## 2024-03-12 ENCOUNTER — TELEPHONE (OUTPATIENT)
Dept: MAMMOGRAPHY | Facility: CLINIC | Age: 49
End: 2024-03-12
Payer: COMMERCIAL

## 2024-03-12 ENCOUNTER — TELEPHONE (OUTPATIENT)
Dept: FAMILY MEDICINE | Facility: CLINIC | Age: 49
End: 2024-03-12
Payer: COMMERCIAL

## 2024-03-12 LAB
PATH REPORT.COMMENTS IMP SPEC: NORMAL
PATH REPORT.COMMENTS IMP SPEC: NORMAL
PATH REPORT.FINAL DX SPEC: NORMAL
PATH REPORT.GROSS SPEC: NORMAL
PATH REPORT.MICROSCOPIC SPEC OTHER STN: NORMAL
PHOTO IMAGE: NORMAL

## 2024-03-12 NOTE — TELEPHONE ENCOUNTER
Pathology report reviewed with our breast radiologist Dr Moran, who confirmed the recent breast imaging is concordant with the final pathology results below.    I phoned Ms Feldman, confirmed her full name, date of birth, and informed patient of her ultrasound Guided right Breast Needle Biopsy (03/11/24) results showing benign -Fibroadenoma.     Recommended follow up is routine screening.   Patient states no problems or concerns with her biopsy site.   Questions were answered and my phone number given if she has further questions or concerns.  I informed patient I will notify the ordering provider of the results and recommendations for follow up.  Patient verbalized understanding and agrees with the plan of care.     Dulce Melgar RN CBCN  Breast Care Nurse Coordinator  [facility]  [phone]

## 2024-03-12 NOTE — TELEPHONE ENCOUNTER
----- Message from April Monica Lazaro MD sent at 3/12/2024 12:57 PM CDT -----  Please call patient and let her know that her breast biopsy shows a benign fibroadenoma.

## 2024-03-13 NOTE — TELEPHONE ENCOUNTER
See other call yesterday, pt was informed of results by breast clinic, encounter closed  Lety Vázquez RN, BSN  [facility]

## 2024-03-27 ENCOUNTER — MYC MEDICAL ADVICE (OUTPATIENT)
Dept: FAMILY MEDICINE | Facility: CLINIC | Age: 49
End: 2024-03-27
Payer: COMMERCIAL

## 2024-03-28 NOTE — TELEPHONE ENCOUNTER
Please see [employer] message in reference to fit testing. Routed to PCP, Please review and advise.     Thank you,    Juan Luu RN    03/28/2024 at 5:53 PM

## 2024-04-09 DIAGNOSIS — Z12.11 COLON CANCER SCREENING: Primary | ICD-10-CM

## 2024-04-18 DIAGNOSIS — I10 BENIGN ESSENTIAL HYPERTENSION: ICD-10-CM

## 2024-04-18 PROCEDURE — 82274 ASSAY TEST FOR BLOOD FECAL: CPT | Performed by: FAMILY MEDICINE

## 2024-04-18 RX ORDER — NIFEDIPINE 30 MG/1
30 TABLET, EXTENDED RELEASE ORAL DAILY
Qty: 90 TABLET | Refills: 0 | Status: SHIPPED | OUTPATIENT
Start: 2024-04-18 | End: 2024-05-17

## 2024-04-19 LAB — HEMOCCULT STL QL IA: NEGATIVE

## 2024-05-14 SDOH — HEALTH STABILITY: PHYSICAL HEALTH: ON AVERAGE, HOW MANY DAYS PER WEEK DO YOU ENGAGE IN MODERATE TO STRENUOUS EXERCISE (LIKE A BRISK WALK)?: 1 DAY

## 2024-05-14 SDOH — HEALTH STABILITY: PHYSICAL HEALTH: ON AVERAGE, HOW MANY MINUTES DO YOU ENGAGE IN EXERCISE AT THIS LEVEL?: 10 MIN

## 2024-05-14 ASSESSMENT — LIFESTYLE VARIABLES
HOW OFTEN DO YOU HAVE SIX OR MORE DRINKS ON ONE OCCASION: NEVER
HOW OFTEN DO YOU HAVE A DRINK CONTAINING ALCOHOL: NEVER
HOW MANY STANDARD DRINKS CONTAINING ALCOHOL DO YOU HAVE ON A TYPICAL DAY: PATIENT DOES NOT DRINK
AUDIT-C TOTAL SCORE: 0
SKIP TO QUESTIONS 9-10: 1

## 2024-05-14 ASSESSMENT — SOCIAL DETERMINANTS OF HEALTH (SDOH)
HOW OFTEN DO YOU ATTENT MEETINGS OF THE CLUB OR ORGANIZATION YOU BELONG TO?: NEVER
HOW OFTEN DO YOU GET TOGETHER WITH FRIENDS OR RELATIVES?: ONCE A WEEK
DO YOU BELONG TO ANY CLUBS OR ORGANIZATIONS SUCH AS CHURCH GROUPS UNIONS, FRATERNAL OR ATHLETIC GROUPS, OR SCHOOL GROUPS?: NO
HOW OFTEN DO YOU ATTEND CHURCH OR RELIGIOUS SERVICES?: MORE THAN 4 TIMES PER YEAR
IN A TYPICAL WEEK, HOW MANY TIMES DO YOU TALK ON THE PHONE WITH FAMILY, FRIENDS, OR NEIGHBORS?: MORE THAN THREE TIMES A WEEK
HOW OFTEN DO YOU GET TOGETHER WITH FRIENDS OR RELATIVES?: ONCE A WEEK

## 2024-05-14 NOTE — COMMUNITY RESOURCES LIST (ENGLISH)
May 14, 2024           YOUR PERSONALIZED LIST OF SERVICES & PROGRAMS           & SHELTER    Housing      [address] [place] [address] - Violence Prevention Services - Domestic Violence Shelter  [place] Barre[place] [address] (Distance: 7.4 miles)  Phone: [phone]  Website: https://[address] [place], [place] [address]USB Promos.org/  Language: English      Action Partnership (CAP) [facility] - Shelter for individuals  [address] [place] [address] (Distance: 3.6 miles)  Language: English, Bulgarian  Fee: Free      Home Health Care [facility] - [facility]  Phone: [phone]  Website: [email or web address].[email or web address].Flocations/  Language: English, Hmong, Oromo, Emirati, Bulgarian  Hours: Mon 9:00 AM - 5:00 PM Tue 9:00 AM - 5:00 PM Wed 9:00 AM - 5:00 PM Thu 9:00 AM - 5:00 PM Fri 9:00 AM - 5:00 PM  Fee: Insurance  Accessibility: Blind accommodation, Deaf or hard of hearing, Translation services  Transportation Options: Free transportation    Case Management      [facility] - Housing search assistance  [address] [place] [address] (Distance: 12.7 miles)  Phone: [phone]  Language: English  Fee: Free, Sliding scale, Insurance  Accessibility: Translation services, Ada accessible, Blind accommodation, Deaf or hard of hearing      [facility] [facility] - Housing search assistance  [address] [place] [address] 13 [facility] [address] [place] [address] (Distance: 6.5 miles)  Phone: (731) [phone]  Language: English  Fee: Sliding scale, Self pay      [facility] - Housing Stabilization Services  Phone: [phone]) [phone]  Website: https://homebasemn.com/  Language: English  Hours: Mon 8:00 AM - 4:00 PM Tue 8:00 AM - 4:00 PM Wed 8:00 AM - 4:00 PM Thu 8:00 AM - 4:00 PM Fri 8:00 AM - 4:00 PM  Fee: Free  Accessibility: Blind accommodation, Deaf or hard of hearing  Transportation Options: Free transportation    Drop-In Services      [employer] - [employer]  [address] [place] [address] 5 [place] [address] (Distance: 18.3 miles)  Phone: [phone]   Language: English  Fee: [facility] - Warming or cooling center - [facility] - [place]  [address] [place] [address] (Distance: 2.1 miles)  Language: English, Sri Lankan, Japanese  Fee: Free      [facility] LOVE  Website: http://www.laundrylove.org               IMPORTANT NUMBERS & WEBSITES        Emergency Services  911  .   United Way  211 http://211unitedway.org  .   Poison Control  (530) 119-3326 http://[place]poison.org http://wisconsinpoison.org  .     Suicide and Crisis Lifeline  988 http://988TheDressSpot.comline.org  .   Childhelp [place] Child Abuse Hotline  307.538.8427 http://Childhelphotline.org   .   National Sexual Assault Hotline  (202) 228-2145 (HOPE) http://Carbon Voyagen.org   .     [place] Runaway Safeline  (370) 835-5712 (RUNAWAY) http://3yy game platformruOsen.MeBeam  .   Pregnancy & Postpartum Support  Call/text 809-772-6819  [place]: http://ppsupportmn.org  [place]: http://psichapters.com/wi  .   Substance Abuse National Helpline ([facility])  327-912-HELP (0320) http://Findtreatment.gov   .                DISCLAIMER: These resources have been generated via the [employer] Platform. [employer] does not endorse any service providers mentioned in this resource list. [employer] does not guarantee that the services mentioned in this resource list will be available to you or will improve your health or wellness.    [facility]

## 2024-05-14 NOTE — COMMUNITY RESOURCES LIST (ENGLISH)
May 14, 2024           YOUR PERSONALIZED LIST OF SERVICES & PROGRAMS           & SHELTER    Housing      [address] [place] [address] - Violence Prevention Services - Domestic Violence Shelter  [place] [address] (Distance: 7.4 miles)  Phone: [phone]  Website: https://[address] [place], [place] [address].org/  Language: English      Action Partnership (CAP) [facility] - Shelter for individuals  [address] [place] [address] (Distance: 3.6 miles)  Language: English, Yakut  Fee: Free      Home Health Care [facility] - [facility]  Phone: [phone]  Website: [email or web address]://www.[facility].Whodini/  Language: English, Hmong, Oromo, Lithuanian, Yakut  Hours: Mon 9:00 AM - 5:00 PM Tue 9:00 AM - 5:00 PM Wed 9:00 AM - 5:00 PM Thu 9:00 AM - 5:00 PM Fri 9:00 AM - 5:00 PM  Fee: Insurance  Accessibility: Blind accommodation, Deaf or hard of hearing, Translation services  Transportation Options: Free transportation    Case Management      [facility] [place] search assistance  [address] [place] [address] (Distance: 12.7 miles)  Phone: [phone]  Language: English  Fee: Free, Sliding scale, Insurance  Accessibility: Translation services, Ada accessible, Blind accommodation, Deaf or hard of hearing      [facility] - Housing search assistance  [address] [place] [address] (Distance: 6.5 miles)  Phone: [phone]) [phone]  Language: English  Fee: Sliding scale, Self pay      [facility] - Housing Stabilization Services  Phone: (151) 821-7592  Website: https://[employer].Whodini/  Language: English  Hours: Mon 8:00 AM - 4:00 PM Tue 8:00 AM - 4:00 PM Wed 8:00 AM - 4:00 PM Thu 8:00 AM - 4:00 PM Fri 8:00 AM - 4:00 PM  Fee: Free  Accessibility: Blind accommodation, Deaf or hard of hearing  Transportation Options: Free transportation    Drop-In Services      [facility] - Warming or cooling center - [facility] - [place]  [address] [place] [address]  (Distance: 2.1 miles)  Language: English, Azeri, Sri Lankan  Fee: Free      [facility] - [employer]  [address] [facility] [address] [place] [address] (Distance: 18.3 miles)  Phone: [phone]) [phone]  Language: English  Fee: Free      [facility] - LAUNDRY LOVE  Website: http://www.laundrylove.org               IMPORTANT NUMBERS & WEBSITES        Emergency Services  911  .   [facility]  211 http://211unitedway.org  .   Poison Control  (672) 920-4555 http://[place]poison.org http://wisconsinpoison.org  .     Suicide and Crisis Lifeline  988 http://988DocVueline.org  .   Childhelp [place] Child Abuse Hotline  595.590.7409 http://Childhelphotline.org   .   [place] Sexual Assault Hotline  (738) 800-6706 (HOPE) http://snagajob.com.org   .     [place] Runaway Safeline  (791) 923-6122 (RUNAWAY) http://Antix LabsruMENABANQER.org  .   Pregnancy & Postpartum Support  Call/text 555-243-7823  [place]: http://ppsupportmn.org  [place]: http://CardStar.com/wi  .   Substance Abuse National Helpline ([facility])  456-629-HELP (2128) http://Findtreatment.gov   .                DISCLAIMER: These resources have been generated via the [employer] Platform. [employer] does not endorse any service providers mentioned in this resource list. [employer] does not guarantee that the services mentioned in this resource list will be available to you or will improve your health or wellness.    [facility]

## 2024-05-17 ENCOUNTER — OFFICE VISIT (OUTPATIENT)
Dept: FAMILY MEDICINE | Facility: CLINIC | Age: 49
End: 2024-05-17
Payer: COMMERCIAL

## 2024-05-17 VITALS
HEART RATE: 86 BPM | HEIGHT: 64 IN | RESPIRATION RATE: 12 BRPM | OXYGEN SATURATION: 100 % | TEMPERATURE: 98.2 F | BODY MASS INDEX: 23.97 KG/M2 | SYSTOLIC BLOOD PRESSURE: 122 MMHG | DIASTOLIC BLOOD PRESSURE: 84 MMHG | WEIGHT: 140.4 LBS

## 2024-05-17 DIAGNOSIS — Z00.00 ROUTINE GENERAL MEDICAL EXAMINATION AT A HEALTH CARE FACILITY: Primary | ICD-10-CM

## 2024-05-17 DIAGNOSIS — I10 BENIGN ESSENTIAL HYPERTENSION: ICD-10-CM

## 2024-05-17 PROBLEM — D24.1 FIBROADENOMA OF BREAST, RIGHT: Status: ACTIVE | Noted: 2024-05-17

## 2024-05-17 LAB
ERYTHROCYTE [DISTWIDTH] IN BLOOD BY AUTOMATED COUNT: 13.4 % (ref 10–15)
HBA1C MFR BLD: 5.6 % (ref 0–5.6)
HCT VFR BLD AUTO: 38.1 % (ref 35–47)
HGB BLD-MCNC: 12.6 G/DL (ref 11.7–15.7)
MCH RBC QN AUTO: 29.9 PG (ref 26.5–33)
MCHC RBC AUTO-ENTMCNC: 33.1 G/DL (ref 31.5–36.5)
MCV RBC AUTO: 91 FL (ref 78–100)
PLATELET # BLD AUTO: 342 10E3/UL (ref 150–450)
RBC # BLD AUTO: 4.21 10E6/UL (ref 3.8–5.2)
WBC # BLD AUTO: 7.6 10E3/UL (ref 4–11)

## 2024-05-17 PROCEDURE — 85027 COMPLETE CBC AUTOMATED: CPT | Performed by: FAMILY MEDICINE

## 2024-05-17 PROCEDURE — 83036 HEMOGLOBIN GLYCOSYLATED A1C: CPT | Performed by: FAMILY MEDICINE

## 2024-05-17 PROCEDURE — 80053 COMPREHEN METABOLIC PANEL: CPT | Performed by: FAMILY MEDICINE

## 2024-05-17 PROCEDURE — 80061 LIPID PANEL: CPT | Performed by: FAMILY MEDICINE

## 2024-05-17 PROCEDURE — 99213 OFFICE O/P EST LOW 20 MIN: CPT | Mod: 25 | Performed by: FAMILY MEDICINE

## 2024-05-17 PROCEDURE — 99396 PREV VISIT EST AGE 40-64: CPT | Performed by: FAMILY MEDICINE

## 2024-05-17 PROCEDURE — 36415 COLL VENOUS BLD VENIPUNCTURE: CPT | Performed by: FAMILY MEDICINE

## 2024-05-17 RX ORDER — NIFEDIPINE 30 MG/1
30 TABLET, EXTENDED RELEASE ORAL DAILY
Qty: 90 TABLET | Refills: 0 | Status: CANCELLED | OUTPATIENT
Start: 2024-05-17

## 2024-05-17 RX ORDER — NIFEDIPINE 30 MG/1
30 TABLET, EXTENDED RELEASE ORAL DAILY
Qty: 90 TABLET | Refills: 2 | Status: SHIPPED | OUTPATIENT
Start: 2024-05-17

## 2024-05-17 NOTE — PROGRESS NOTES
Preventive Care Visit  [facility]  April JIM Lazaro MD, Family Medicine  May 17, 2024      Assessment & Plan     Routine general medical examination at a health care facility  Exam completed today, routine health maintenance items updated as able.  Labs ordered.  Follow up one year or sooner as needed.  - Lipid panel reflex to direct LDL Fasting; Future  - Hemoglobin A1c; Future  - CBC with platelets; Future  - Lipid panel reflex to direct LDL Fasting  - Hemoglobin A1c  - CBC with platelets    Benign essential hypertension  Controlled, continue Nifedipine  - NIFEdipine ER OSMOTIC (PROCARDIA XL) 30 MG 24 hr tablet; Take 1 tablet (30 mg) by mouth daily  - Comprehensive metabolic panel (BMP + Alb, Alk Phos, ALT, AST, Total. Bili, TP); Future  - Comprehensive metabolic panel (BMP + Alb, Alk Phos, ALT, AST, Total. Bili, TP)      Counseling  Appropriate preventive services were discussed with this patient, including applicable screening as appropriate for fall prevention, nutrition, physical activity, Tobacco-use cessation, weight loss and cognition.  Checklist reviewing preventive services available has been given to the patient.  Reviewed patient's diet, addressing concerns and/or questions.   She is at risk for lack of exercise and has been provided with information to increase physical activity for the benefit of her well-being.   The patient was instructed to see the dentist every 6 months.       See Patient Instructions    Mark Pickett is a 48 year old, presenting for the following:  Physical (Discuss some concerns with tachycardia at moments, states a feeling of palpitations, stopped taking birth control due to x3 weeks lasting menstruals, severe cramping, has done mammogram, biopsy, and colonoscopy )        5/17/2024     3:13 PM   Additional Questions   Roomed by AT      Here for wellness visit.    Checks BP at home, is good.    Health Care Directive  Patient does not have a  Health Care Directive or Living Will: Discussed advance care planning with patient; however, patient declined at this time.    Healthy Habits:     Getting at least 3 servings of Calcium per day:  NO    Bi-annual eye exam:  NO    Dental care twice a year:  NO    Sleep apnea or symptoms of sleep apnea:  None    Diet:  Regular (no restrictions)    Frequency of exercise:  1 day/week    Duration of exercise:  Less than 15 minutes    Taking medications regularly:  Yes    Barriers to taking medications:  None    Medication side effects:  None    Additional concerns today:  Yes        5/14/2024   General Health   How would you rate your overall physical health? Good   Feel stress (tense, anxious, or unable to sleep) Not at all    Not at all         5/14/2024   Nutrition   Three or more servings of calcium each day? Yes   Diet: Regular (no restrictions)   How many servings of fruit and vegetables per day? (!) 2-3   How many sweetened beverages each day? 0-1         5/14/2024   Exercise   Days per week of moderate/strenous exercise 1 day    1 day   Average minutes spent exercising at this level 10 min    10 min   (!) EXERCISE CONCERN      5/14/2024   Social Factors   Frequency of gathering with friends or relatives Once a week    Once a week   Worry food won't last until get money to buy more No    No   Food not last or not have enough money for food? No    No   Do you have housing?  No    No   Are you worried about losing your housing? No    No   Lack of transportation? No    No   Unable to get utilities (heat,electricity)? No    No   Want help with housing or utility concern? No   (!) HOUSING CONCERN PRESENT      5/14/2024   Dental   Dentist two times every year? (!) NO         5/14/2024   TB Screening   Were you born outside of the [place]? Yes         Today's PHQ-2 Score:       5/17/2024     3:13 PM   PHQ-2 ( 1999 Pfizer)   Q1: Little interest or pleasure in doing things 0   Q2: Feeling down, depressed or hopeless 0   PHQ-2  Score 0   Q1: Little interest or pleasure in doing things Not at all   Q2: Feeling down, depressed or hopeless Not at all   PHQ-2 Score 0           5/14/2024   Substance Use   Frequency of drinking alcohol? Never   Alcohol more than 3/day or more than 7/wk Not Applicable   Do you use any other substances recreationally? No     Social History     Tobacco Use    Smoking status: Never    Smokeless tobacco: Never   Vaping Use    Vaping status: Never Used   Substance Use Topics    Alcohol use: Never    Drug use: Never           2/26/2024   LAST FHS-7 RESULTS   1st degree relative breast or ovarian cancer Yes   Any relative bilateral breast cancer No   Any male have breast cancer No   Any ONE woman have BOTH breast AND ovarian cancer No   Any woman with breast cancer before 50yrs No   2 or more relatives with breast AND/OR ovarian cancer No   2 or more relatives with breast AND/OR bowel cancer No        Mammogram Screening - Mammogram every 1-2 years updated in Health Maintenance based on mutual decision making        5/14/2024   STI Screening   New sexual partner(s) since last STI/HIV test? No     History of abnormal Pap smear: No - age 30-64 HPV with reflex Pap every 5 years recommended        Latest Ref Rng & Units 7/17/2020     4:09 PM 7/17/2020     4:02 PM   PAP / HPV   PAP (Historical)   NIL    HPV 16 DNA NEG^Negative Negative     HPV 18 DNA NEG^Negative Negative     Other HR HPV NEG^Negative Negative       ASCVD Risk   The 10-year ASCVD risk score (Cary WREN, et al., 2019) is: 1.2%    Values used to calculate the score:      Age: 48 years      Sex: Female      Is Non- : No      Diabetic: No      Tobacco smoker: No      Systolic Blood Pressure: 138 mmHg      Is BP treated: Yes      HDL Cholesterol: 65 mg/dL      Total Cholesterol: 219 mg/dL        5/14/2024   Contraception/Family Planning   Questions about contraception or family planning No        Reviewed and updated as needed this  visit by Provider     Meds                Past Medical History:   Diagnosis Date    Hypertension      Past Surgical History:   Procedure Laterality Date    AS REMOVAL OF OVARIAN CYST(S) Left 2016    GYN SURGERY  2016     Lab work is in process  Labs reviewed in EPIC  BP Readings from Last 3 Encounters:   05/17/24 (!) 138/93   04/30/23 (!) 142/76   01/17/23 118/78    Wt Readings from Last 3 Encounters:   05/17/24 63.7 kg (140 lb 6.4 oz)   04/30/23 59 kg (130 lb)   01/17/23 61.7 kg (136 lb)                  Patient Active Problem List   Diagnosis    Cyst of left ovary    Family history of breast cancer in first degree relative    Benign essential hypertension    Endometriosis    Menorrhagia    Fibroadenoma of breast, right     Past Surgical History:   Procedure Laterality Date    AS REMOVAL OF OVARIAN CYST(S) Left 2016    GYN SURGERY  2016       Social History     Tobacco Use    Smoking status: Never    Smokeless tobacco: Never   Substance Use Topics    Alcohol use: Never     Family History   Problem Relation Age of Onset    Hypertension Mother     Diabetes Father     Hypertension Father     Breast Cancer Sister 46    Breast Cancer Sister     Hypertension Brother     Colon Cancer No family hx of          Current Outpatient Medications   Medication Sig Dispense Refill    Ascorbic Acid (VITAMIN C PO)       Cyanocobalamin (VITAMIN B-12 PO)       ferrous sulfate (FEROSUL) 325 (65 Fe) MG tablet       Multiple Vitamin (MULTIVITAMIN ADULT PO)       NIFEdipine ER OSMOTIC (PROCARDIA XL) 30 MG 24 hr tablet Take 1 tablet (30 mg) by mouth daily 90 tablet 2    VITAMIN D PO        No Known Allergies  Recent Labs   Lab Test 11/07/22  0831 08/27/21  1127 07/17/20  1608 07/31/19  0943   A1C 5.4  --   --   --    * 108* 109* 106*   HDL 65 62 75 62   TRIG 121 58 126 117   ALT 35 17 24 16   CR 0.49* 0.56 0.48* 0.50*   GFRESTIMATED >90 >90 >90 >90   GFRESTBLACK  --   --  >90 >90   POTASSIUM 3.7 3.6 3.7 4.2   TSH  --   --  1.81 1.56  "          Objective    Exam  BP (!) 138/93 (BP Location: Right arm, Patient Position: Chair, Cuff Size: Adult Regular)   Pulse 86   Temp 98.2  F (36.8  C) (Oral)   Resp 12   Ht 1.626 m (5' 4\")   Wt 63.7 kg (140 lb 6.4 oz)   LMP 04/29/2024 (Exact Date)   SpO2 100%   BMI 24.10 kg/m     Estimated body mass index is 24.1 kg/m  as calculated from the following:    Height as of this encounter: 1.626 m (5' 4\").    Weight as of this encounter: 63.7 kg (140 lb 6.4 oz).    Physical Exam  GENERAL: alert and no distress  EYES: Eyes grossly normal to inspection, PERRL and conjunctivae and sclerae normal  HENT: ear canals and TM's normal, nose and mouth without ulcers or lesions  NECK: no adenopathy, no asymmetry, masses, or scars  RESP: lungs clear to auscultation - no rales, rhonchi or wheezes  CV: regular rate and rhythm, normal S1 S2, no S3 or S4, no murmur, click or rub, no peripheral edema  ABDOMEN: soft, nontender, no hepatosplenomegaly, no masses and bowel sounds normal  MS: no gross musculoskeletal defects noted, no edema  SKIN: no suspicious lesions or rashes  NEURO: Normal strength and tone, mentation intact and speech normal  PSYCH: mentation appears normal, affect normal/bright        Signed Electronically by: Sherry Lazaro MD    "

## 2024-05-17 NOTE — PATIENT INSTRUCTIONS
"Preventive Care Advice   This is general advice we often give to help people stay healthy. Your care team may have specific advice just for you. Please talk to your care team about your own preventive care needs.  Lifestyle  Exercise at least 150 minutes each week (30 minutes a day, 5 days a week).  Do muscle strengthening activities 2 days a week. These help control your weight and prevent disease.  No smoking.  Wear sunscreen to prevent skin cancer.  Have your home tested for radon every 2 to 5 years. Radon is a colorless, odorless gas that can harm your lungs. To learn more, go to www.health.[facility].mn. and search for \"Radon in Homes.\"  Keep guns unloaded and locked up in a safe place like a safe or gun vault, or, use a gun lock and hide the keys. Always lock away bullets separately. To learn more, visit [employer].[place].gov and search for \"safe gun storage.\"  Nutrition  Eat 5 or more servings of fruits and vegetables each day.  Try wheat bread, brown rice and whole grain pasta (instead of white bread, rice, and pasta).  Get enough calcium and vitamin D. Check the label on foods and aim for 100% of the RDA (recommended daily allowance).  Regular exams  Have a dental exam and cleaning every 6 months.  See your health care team every year to talk about:  Any changes in your health.  Any medicines your care team has prescribed.  Preventive care, family planning, and ways to prevent chronic diseases.  Shots (vaccines)   HPV shots (up to age 26), if you've never had them before.  Hepatitis B shots (up to age 59), if you've never had them before.  COVID-19 shot: Get this shot when it's due.  Flu shot: Get a flu shot every year.  Tetanus shot: Get a tetanus shot every 10 years.  Pneumococcal, hepatitis A, and RSV shots: Ask your care team if you need these based on your risk.  Shingles shot (for age 50 and up).  General health tests  Diabetes screening:  Starting at age 35, Get screened for diabetes at least every 3 years.  If " you are younger than age 35, ask your care team if you should be screened for diabetes.  Cholesterol test: At age 39, start having a cholesterol test every 5 years, or more often if advised.  Bone density scan (DEXA): At age 50, ask your care team if you should have this scan for osteoporosis (brittle bones).  Hepatitis C: Get tested at least once in your life.  Abdominal aortic aneurysm screening: Talk to your doctor about having this screening if you:  Have ever smoked; and  Are biologically male; and  Are between the ages of 65 and 75.  STIs (sexually transmitted infections)  Before age 24: Ask your care team if you should be screened for STIs.  After age 24: Get screened for STIs if you're at risk. You are at risk for STIs (including HIV) if:  You are sexually active with more than one person.  You don't use condoms every time.  You or a partner was diagnosed with a sexually transmitted infection.  If you are at risk for HIV, ask about PrEP medicine to prevent HIV.  Get tested for HIV at least once in your life, whether you are at risk for HIV or not.  Cancer screening tests  Cervical cancer screening: If you have a cervix, begin getting regular cervical cancer screening tests at age 21. Most people who have regular screenings with normal results can stop after age 65. Talk about this with your provider.  Breast cancer scan (mammogram): If you've ever had breasts, begin having regular mammograms starting at age 40. This is a scan to check for breast cancer.  Colon cancer screening: It is important to start screening for colon cancer at age 45.  Have a colonoscopy test every 10 years (or more often if you're at risk) Or, ask your provider about stool tests like a FIT test every year or Cologuard test every 3 years.  To learn more about your testing options, visit: www.[employer]/572169.pdf.  For help making a decision, visit: christina/uw52125.  Prostate cancer screening test: If you have a prostate and are age 55  to 69, ask your provider if you would benefit from a yearly prostate cancer screening test.  Lung cancer screening: If you are a current or former smoker age 50 to 80, ask your care team if ongoing lung cancer screenings are right for you.  For informational purposes only. Not to replace the advice of your health care provider. Copyright   2023 [employer]. All rights reserved. Clinically reviewed by the [facility] Transitions Program. [employer] 327025 - REV 04/24.

## 2024-05-18 LAB
ALBUMIN SERPL BCG-MCNC: 4.5 G/DL (ref 3.5–5.2)
ALP SERPL-CCNC: 84 U/L (ref 40–150)
ALT SERPL W P-5'-P-CCNC: 16 U/L (ref 0–50)
ANION GAP SERPL CALCULATED.3IONS-SCNC: 10 MMOL/L (ref 7–15)
AST SERPL W P-5'-P-CCNC: 16 U/L (ref 0–45)
BILIRUB SERPL-MCNC: 0.5 MG/DL
BUN SERPL-MCNC: 14.9 MG/DL (ref 6–20)
CALCIUM SERPL-MCNC: 9.5 MG/DL (ref 8.6–10)
CHLORIDE SERPL-SCNC: 102 MMOL/L (ref 98–107)
CHOLEST SERPL-MCNC: 216 MG/DL
CREAT SERPL-MCNC: 0.56 MG/DL (ref 0.51–0.95)
DEPRECATED HCO3 PLAS-SCNC: 26 MMOL/L (ref 22–29)
EGFRCR SERPLBLD CKD-EPI 2021: >90 ML/MIN/1.73M2
FASTING STATUS PATIENT QL REPORTED: YES
FASTING STATUS PATIENT QL REPORTED: YES
GLUCOSE SERPL-MCNC: 104 MG/DL (ref 70–99)
HDLC SERPL-MCNC: 58 MG/DL
LDLC SERPL CALC-MCNC: 142 MG/DL
NONHDLC SERPL-MCNC: 158 MG/DL
POTASSIUM SERPL-SCNC: 4.1 MMOL/L (ref 3.4–5.3)
PROT SERPL-MCNC: 7.6 G/DL (ref 6.4–8.3)
SODIUM SERPL-SCNC: 138 MMOL/L (ref 135–145)
TRIGL SERPL-MCNC: 81 MG/DL

## 2024-08-16 ENCOUNTER — MYC MEDICAL ADVICE (OUTPATIENT)
Dept: FAMILY MEDICINE | Facility: CLINIC | Age: 49
End: 2024-08-16
Payer: COMMERCIAL

## 2024-08-16 ENCOUNTER — MYC REFILL (OUTPATIENT)
Dept: FAMILY MEDICINE | Facility: CLINIC | Age: 49
End: 2024-08-16
Payer: COMMERCIAL

## 2024-08-16 DIAGNOSIS — I10 BENIGN ESSENTIAL HYPERTENSION: ICD-10-CM

## 2024-08-16 RX ORDER — NIFEDIPINE 30 MG/1
30 TABLET, EXTENDED RELEASE ORAL DAILY
Qty: 90 TABLET | Refills: 2 | OUTPATIENT
Start: 2024-08-16

## 2024-08-16 NOTE — TELEPHONE ENCOUNTER
See pt's Branchly message.     Replied to pt via Branchly to schedule travel appointments.    Belia MO RN   Clinic RN  [facility]

## 2024-09-09 ENCOUNTER — OFFICE VISIT (OUTPATIENT)
Dept: OBGYN | Facility: CLINIC | Age: 49
End: 2024-09-09
Payer: COMMERCIAL

## 2024-09-09 VITALS
WEIGHT: 144 LBS | HEIGHT: 64 IN | DIASTOLIC BLOOD PRESSURE: 84 MMHG | BODY MASS INDEX: 24.59 KG/M2 | SYSTOLIC BLOOD PRESSURE: 126 MMHG

## 2024-09-09 DIAGNOSIS — N93.9 ABNORMAL UTERINE BLEEDING: Primary | ICD-10-CM

## 2024-09-09 PROCEDURE — 88305 TISSUE EXAM BY PATHOLOGIST: CPT | Performed by: PATHOLOGY

## 2024-09-09 PROCEDURE — 99214 OFFICE O/P EST MOD 30 MIN: CPT | Mod: 25 | Performed by: OBSTETRICS & GYNECOLOGY

## 2024-09-09 PROCEDURE — 99459 PELVIC EXAMINATION: CPT | Performed by: OBSTETRICS & GYNECOLOGY

## 2024-09-09 PROCEDURE — 58100 BIOPSY OF UTERUS LINING: CPT | Performed by: OBSTETRICS & GYNECOLOGY

## 2024-09-09 NOTE — NURSING NOTE
"Chief Complaint   Patient presents with    endometrosis       Initial /84 (BP Location: Left arm, Patient Position: Chair, Cuff Size: Adult Regular)   Ht 1.626 m (5' 4\")   Wt 65.3 kg (144 lb)   LMP 2024 (Exact Date)   Breastfeeding No   BMI 24.72 kg/m   Estimated body mass index is 24.72 kg/m  as calculated from the following:    Height as of this encounter: 1.626 m (5' 4\").    Weight as of this encounter: 65.3 kg (144 lb).  BP completed using cuff size: regular    Questioned patient about current smoking habits.  Pt. has never smoked.          The following HM Due: NONE  Suzi Fu CMA             "

## 2024-09-10 LAB
PATH REPORT.COMMENTS IMP SPEC: NORMAL
PATH REPORT.COMMENTS IMP SPEC: NORMAL
PATH REPORT.FINAL DX SPEC: NORMAL
PATH REPORT.GROSS SPEC: NORMAL
PATH REPORT.MICROSCOPIC SPEC OTHER STN: NORMAL
PATH REPORT.RELEVANT HX SPEC: NORMAL
PHOTO IMAGE: NORMAL

## 2024-09-12 RX ORDER — NORGESTIMATE AND ETHINYL ESTRADIOL 0.25-0.035
1 KIT ORAL DAILY
Qty: 84 TABLET | Refills: 4 | Status: SHIPPED | OUTPATIENT
Start: 2024-09-12

## 2024-09-13 ENCOUNTER — ANCILLARY PROCEDURE (OUTPATIENT)
Dept: ULTRASOUND IMAGING | Facility: CLINIC | Age: 49
End: 2024-09-13
Attending: OBSTETRICS & GYNECOLOGY
Payer: COMMERCIAL

## 2024-09-13 DIAGNOSIS — N93.9 ABNORMAL UTERINE BLEEDING: ICD-10-CM

## 2024-09-13 PROCEDURE — 76856 US EXAM PELVIC COMPLETE: CPT | Performed by: FAMILY MEDICINE

## 2024-09-13 PROCEDURE — 76830 TRANSVAGINAL US NON-OB: CPT | Performed by: FAMILY MEDICINE

## 2024-09-13 NOTE — PROGRESS NOTES
"  SUBJECTIVE:                                                   CC:  Patient presents with:  endometrosis      HPI:  Piyush Feldman is a 48 year old  who presents to discuss her abnormal uterine bleeding.  She has been seen previously for the same issue and has been using OCPs to treat symptoms.    She recently took self off of the OCP because of continuous breakthrough bleeding while on the OCP.  She uses Aleve for cramping which does help, but then she feels weak during cycles.  During menses, she experiences extremely heavy bleeding for 1 day, then moderate bleeding for several days afterwards.  In , she experienced thick brown discharge between her periods    She is going to the [facility] on  for a wedding but upon return is potentially interested in some more long-term management plan for her bleeding.    Pap smear is up-to-date, negative and normal in 2020.  She did have pelvic imaging in  showing fibroid and a simple cyst.    Gyn History:  Patient's last menstrual period was 2024 (exact date).       Using periodic abstinence and not sexually active for contraception.    Last 3 Pap and HPV Results:       Latest Ref Rng & Units 2020     4:09 PM 2020     4:02 PM   PAP / HPV   PAP (Historical)   NIL    HPV 16 DNA NEG^Negative Negative     HPV 18 DNA NEG^Negative Negative     Other HR HPV NEG^Negative Negative         PMH, PSH, Soc Hx, Fam Hx, Meds, and allergies reviewed in Epic.    OBJECTIVE:     /84 (BP Location: Left arm, Patient Position: Chair, Cuff Size: Adult Regular)   Ht 1.626 m (5' 4\")   Wt 65.3 kg (144 lb)   LMP 2024 (Exact Date)   Breastfeeding No   BMI 24.72 kg/m      Gen: Healthy appearing thin female, no acute distress, comfortable  Psych: mentation appears normal and affect bright  : Normal external female genitalia.  No external lesions.   SSE: Speculum exam reveals vaginal epithelium well rugated with normal " physiologic discharge. Cervix appears smooth, pink, with no visible lesions. Uterus with fullness posteriorly, mobile, slightly enlarged    Endometrial biopsy procedure note    INDICATIONS:                                                    Is a pregnancy test required: No.  Was a consent obtained?  Yes    Having endometrial biopsy for abnormal uterine bleeding    PROCEDURE;                                                      A speculum was placed in the vagina and cervix prepped with betadine. A tenaculum was not attached to the cervix. A small plastic 5 mm Pipelle syringe curette was inserted into the cervical canal. The uterus was sounded to 7 cm's. A vigorous four quadrant biopsy was performed, removing amount moderate of tissue. The speculum was removed. This tissue was placed in Formalin and sent to pathology.    The patient tolerated the procedure  well and she reported there was  cramping.      POST PROCEDURE;                                                      There  was no cramping at the time of discharge. She  tolerated the procedure well. There were no complications. Patient was discharged in stable condition.    Patient advised to call the clinic if severe pelvic pain, fever or heavy bleeding.    Ce Rodríguez MD      Test Results:  [facility]     ULTRASOUND - PELVIC GYN- Transabdominal and Transvaginal     Referring MD: Ce Rodríguez MD     ===================================     CLINICAL INFORMATION     Indications for ultrasound:   Hx - endometriosis, dysmenorrhea, ovarian cysts     LMP: 23 Nov 2022    Hormones: none     Measurements:  Uterus: 8.9 x 8.2 x 7.2 cm     Position is retroverted.  Contour is irregular w/ myomata:   1) posterior 4.6 x 3.8 cm, 2) posterior 2.2 x 1.9 cm     Endo cav: 8.5 mm       Distorted  Cervix: wnl     Right ovary: 2.9 x 2.4 x 2.2 cm  Wnl  Left ovary: 4.4 x 3.2 x 4.2 cm Simple cyst 3.6 x 2.2 x 2.8cm     Cul de sac: no free  fluid     ===================================  Impression:     1. Fibroid uterus.  Largest fibroid measures 4.6cm in greatest dimension and is located posteriorly.   2. Endometrial cavity slightly distorted and difficult to view due to shadowing from fibroids.    2. Left ovarian hypoechoic cyst, appearing functional and likely benign.  To ensure resolution or evaluate for growth of this ovarian cyst, consider repeat ultrasound in 6-8 weeks.       ASSESSMENT/PLAN:                                                      1. Abnormal uterine bleeding  Discussed etiologies for abnormal uterine bleeding; including polyps, fibroids, adenomyosis, endometrial cancer or pre-cancer, hormonal imbalances related to menopause, coagulopathy, thyroid disorder, or medication side effect. Recommend endometrial biopsy to rule out malignancy which we did do today. In her case, the most probable cause is hormonal imbalances related to perimenopause, fibroids, based on pattern of bleeding potentially polyps. Discussed methods of treatment of heavy uterine bleeding including birth control pills, Mirena IUD, nexplanon, endometrial ablation, or hysterectomy.  Will proceed with pelvic US and EMB, gave rx for OCP to use for now until she returns from her trip and then definitely consider hysterectomy later this fall.   - US Pelvic Complete with Transvaginal; Future  - Surgical Pathology Exam  - norgestimate-ethinyl estradiol (ORTHO-CYCLEN) 0.25-35 MG-MCG tablet; Take 1 tablet by mouth daily.  Dispense: 84 tablet; Refill: 4  - ENDOMETRIAL BIOPSY W/O CERVICAL DILATION     Ce Rodríguez MD, MPH  Obstetrics and Gynecology

## 2024-09-17 DIAGNOSIS — N83.209 OVARIAN CYST: Primary | ICD-10-CM

## 2024-11-11 ENCOUNTER — ANCILLARY PROCEDURE (OUTPATIENT)
Dept: ULTRASOUND IMAGING | Facility: CLINIC | Age: 49
End: 2024-11-11
Attending: OBSTETRICS & GYNECOLOGY
Payer: COMMERCIAL

## 2024-11-11 DIAGNOSIS — N83.209 OVARIAN CYST: ICD-10-CM

## 2024-11-11 PROCEDURE — 76830 TRANSVAGINAL US NON-OB: CPT | Performed by: FAMILY MEDICINE

## 2024-11-11 PROCEDURE — 76856 US EXAM PELVIC COMPLETE: CPT | Performed by: FAMILY MEDICINE

## 2024-11-12 ENCOUNTER — OFFICE VISIT (OUTPATIENT)
Dept: INTERNAL MEDICINE | Facility: CLINIC | Age: 49
End: 2024-11-12
Payer: COMMERCIAL

## 2024-11-12 VITALS
RESPIRATION RATE: 16 BRPM | OXYGEN SATURATION: 100 % | HEIGHT: 64 IN | SYSTOLIC BLOOD PRESSURE: 156 MMHG | HEART RATE: 102 BPM | BODY MASS INDEX: 25.27 KG/M2 | TEMPERATURE: 98.3 F | DIASTOLIC BLOOD PRESSURE: 86 MMHG | WEIGHT: 148 LBS

## 2024-11-12 DIAGNOSIS — M25.472 BILATERAL SWELLING OF FEET AND ANKLES: Primary | ICD-10-CM

## 2024-11-12 DIAGNOSIS — M25.475 BILATERAL SWELLING OF FEET AND ANKLES: Primary | ICD-10-CM

## 2024-11-12 DIAGNOSIS — M25.471 BILATERAL SWELLING OF FEET AND ANKLES: Primary | ICD-10-CM

## 2024-11-12 DIAGNOSIS — I10 BENIGN ESSENTIAL HYPERTENSION: ICD-10-CM

## 2024-11-12 DIAGNOSIS — M25.474 BILATERAL SWELLING OF FEET AND ANKLES: Primary | ICD-10-CM

## 2024-11-12 LAB
ALBUMIN UR-MCNC: NEGATIVE MG/DL
APPEARANCE UR: CLEAR
BACTERIA #/AREA URNS HPF: ABNORMAL /HPF
BILIRUB UR QL STRIP: NEGATIVE
COLOR UR AUTO: YELLOW
ERYTHROCYTE [DISTWIDTH] IN BLOOD BY AUTOMATED COUNT: 13.8 % (ref 10–15)
GLUCOSE UR STRIP-MCNC: NEGATIVE MG/DL
HCT VFR BLD AUTO: 39.2 % (ref 35–47)
HGB BLD-MCNC: 12.5 G/DL (ref 11.7–15.7)
HGB UR QL STRIP: ABNORMAL
KETONES UR STRIP-MCNC: NEGATIVE MG/DL
LEUKOCYTE ESTERASE UR QL STRIP: ABNORMAL
MCH RBC QN AUTO: 28.5 PG (ref 26.5–33)
MCHC RBC AUTO-ENTMCNC: 31.9 G/DL (ref 31.5–36.5)
MCV RBC AUTO: 90 FL (ref 78–100)
MUCOUS THREADS #/AREA URNS LPF: PRESENT /LPF
NITRATE UR QL: NEGATIVE
PH UR STRIP: 6 [PH] (ref 5–7)
PLATELET # BLD AUTO: 436 10E3/UL (ref 150–450)
RBC # BLD AUTO: 4.38 10E6/UL (ref 3.8–5.2)
RBC #/AREA URNS AUTO: ABNORMAL /HPF
SP GR UR STRIP: 1.02 (ref 1–1.03)
SQUAMOUS #/AREA URNS AUTO: ABNORMAL /LPF
UROBILINOGEN UR STRIP-ACNC: 0.2 E.U./DL
WBC # BLD AUTO: 7.8 10E3/UL (ref 4–11)
WBC #/AREA URNS AUTO: ABNORMAL /HPF

## 2024-11-12 PROCEDURE — 36415 COLL VENOUS BLD VENIPUNCTURE: CPT

## 2024-11-12 PROCEDURE — 99214 OFFICE O/P EST MOD 30 MIN: CPT

## 2024-11-12 PROCEDURE — 85027 COMPLETE CBC AUTOMATED: CPT

## 2024-11-12 PROCEDURE — 80053 COMPREHEN METABOLIC PANEL: CPT

## 2024-11-12 PROCEDURE — 81001 URINALYSIS AUTO W/SCOPE: CPT

## 2024-11-12 PROCEDURE — 82570 ASSAY OF URINE CREATININE: CPT

## 2024-11-12 PROCEDURE — 84443 ASSAY THYROID STIM HORMONE: CPT

## 2024-11-12 PROCEDURE — G2211 COMPLEX E/M VISIT ADD ON: HCPCS

## 2024-11-12 PROCEDURE — 82043 UR ALBUMIN QUANTITATIVE: CPT

## 2024-11-12 RX ORDER — LOSARTAN POTASSIUM 50 MG/1
50 TABLET ORAL DAILY
Qty: 60 TABLET | Refills: 0 | Status: SHIPPED | OUTPATIENT
Start: 2024-11-12

## 2024-11-12 NOTE — PROGRESS NOTES
Assessment & Plan     (M25.471,  M25.474,  M25.475,  M25.472) Bilateral swelling of feet and ankles  (primary encounter diagnosis)  Comment: Intermittent bilateral lower extremity swelling in her ankles over the past 1 month, R>L. She returned from the [facility] about 1 month ago. She wore compression stockings during plane ride.   While on vacation in [facility] she noticed mild swelling in bilateral feet for the first time.   She has been taking Nifedipine ER 30MG since 2019 for HTN. Piyush did try holding her Nifedipine for 2-3 days and did notice resolution of her symptoms. Using compression stockings and elevating her feet helps with symptoms.  Over the past month she has noticed four separate times where symptoms have occurred. Swelling will last for 2-3 days and then resolve.   No orthopnea, dyspnea or paroxysmal nocturnal dyspnea. No surrounding erythema or warmth present.   Today she does not have any swelling present upon exam. We will check baseline labs today.  We  discussed it may be worthwhile stopping her Nifedipine to see if symptoms resolve. We will go ahead and stop the Nifedipine ER 30MG today and start Losartan 50MG.  She will see me back in 4-6 weeks. Will recheck BMP at follow up visit.   Plan: Comprehensive metabolic panel (BMP + Alb, Alk         Phos, ALT, AST, Total. Bili, TP), CBC with         platelets, TSH with free T4 reflex, UA         Macroscopic with reflex to Microscopic and         Culture - Lab Collect, Albumin Random Urine         Quantitative with Creat Ratio            (I10) Benign essential hypertension  Comment:   Plan: losartan (COZAAR) 50 MG tablet          The longitudinal plan of care for the diagnosis(es)/condition(s) as documented were addressed during this visit. Due to the added complexity in care, I will continue to support Piyush in the subsequent management and with ongoing continuity of care.      BMI  Estimated body mass index is 25.4 kg/m  as calculated  "from the following:    Height as of this encounter: 1.626 m (5' 4\").    Weight as of this encounter: 67.1 kg (148 lb).         Mark Pickett is a 49 year old, presenting for the following health issues:  Swelling (Feet and ankles )      11/12/2024     3:49 PM   Additional Questions   Roomed by Suly     History of Present Illness       Reason for visit:  Intermittent swelling of ankle and top of feet  Symptom onset:  3-4 weeks ago  Symptoms include:  Edema of ankle and top of feet  Symptom intensity:  Moderate  Symptom progression:  Improving  Had these symptoms before:  No She is missing 1 dose(s) of medications per week.  She is not taking prescribed medications regularly due to remembering to take.               Objective    BP (!) 158/90   Pulse 102   Temp 98.3  F (36.8  C) (Tympanic)   Resp 16   Ht 1.626 m (5' 4\")   Wt 67.1 kg (148 lb)   LMP 11/06/2024   SpO2 100%   BMI 25.40 kg/m    Body mass index is 25.4 kg/m .  Physical Exam  Constitutional:       General: She is not in acute distress.     Appearance: Normal appearance. She is not ill-appearing, toxic-appearing or diaphoretic.   HENT:      Head: Normocephalic and atraumatic.   Cardiovascular:      Rate and Rhythm: Normal rate.      Heart sounds: Normal heart sounds.   Pulmonary:      Effort: Pulmonary effort is normal.      Breath sounds: Normal breath sounds.   Musculoskeletal:      Right lower leg: No edema.      Left lower leg: No edema.   Skin:     General: Skin is warm and dry.   Neurological:      Mental Status: She is alert and oriented to person, place, and time.   Psychiatric:         Mood and Affect: Mood normal.         Behavior: Behavior normal.         Thought Content: Thought content normal.         Judgment: Judgment normal.                    Signed Electronically by: ROBEL Barone CNP    "

## 2024-11-13 LAB
ALBUMIN SERPL BCG-MCNC: 4.3 G/DL (ref 3.5–5.2)
ALP SERPL-CCNC: 97 U/L (ref 40–150)
ALT SERPL W P-5'-P-CCNC: 30 U/L (ref 0–50)
ANION GAP SERPL CALCULATED.3IONS-SCNC: 15 MMOL/L (ref 7–15)
AST SERPL W P-5'-P-CCNC: 23 U/L (ref 0–45)
BILIRUB SERPL-MCNC: 0.3 MG/DL
BUN SERPL-MCNC: 12.5 MG/DL (ref 6–20)
CALCIUM SERPL-MCNC: 10 MG/DL (ref 8.8–10.4)
CHLORIDE SERPL-SCNC: 100 MMOL/L (ref 98–107)
CREAT SERPL-MCNC: 0.55 MG/DL (ref 0.51–0.95)
CREAT UR-MCNC: 134 MG/DL
EGFRCR SERPLBLD CKD-EPI 2021: >90 ML/MIN/1.73M2
GLUCOSE SERPL-MCNC: 100 MG/DL (ref 70–99)
HCO3 SERPL-SCNC: 25 MMOL/L (ref 22–29)
MICROALBUMIN UR-MCNC: 54.8 MG/L
MICROALBUMIN/CREAT UR: 40.9 MG/G CR (ref 0–25)
POTASSIUM SERPL-SCNC: 3.6 MMOL/L (ref 3.4–5.3)
PROT SERPL-MCNC: 8.2 G/DL (ref 6.4–8.3)
SODIUM SERPL-SCNC: 140 MMOL/L (ref 135–145)
TSH SERPL DL<=0.005 MIU/L-ACNC: 2.32 UIU/ML (ref 0.3–4.2)

## 2024-11-25 ENCOUNTER — OFFICE VISIT (OUTPATIENT)
Dept: OBGYN | Facility: CLINIC | Age: 49
End: 2024-11-25
Payer: COMMERCIAL

## 2024-11-25 ENCOUNTER — TELEPHONE (OUTPATIENT)
Dept: OBGYN | Facility: CLINIC | Age: 49
End: 2024-11-25

## 2024-11-25 VITALS
WEIGHT: 149 LBS | DIASTOLIC BLOOD PRESSURE: 90 MMHG | HEIGHT: 64 IN | BODY MASS INDEX: 25.44 KG/M2 | SYSTOLIC BLOOD PRESSURE: 150 MMHG

## 2024-11-25 DIAGNOSIS — D25.9 UTERINE LEIOMYOMA, UNSPECIFIED LOCATION: Primary | ICD-10-CM

## 2024-11-25 DIAGNOSIS — N94.6 DYSMENORRHEA: ICD-10-CM

## 2024-11-25 DIAGNOSIS — N93.9 ABNORMAL UTERINE BLEEDING (AUB): ICD-10-CM

## 2024-11-25 DIAGNOSIS — N83.202 LEFT OVARIAN CYST: ICD-10-CM

## 2024-11-25 PROCEDURE — G2211 COMPLEX E/M VISIT ADD ON: HCPCS | Performed by: OBSTETRICS & GYNECOLOGY

## 2024-11-25 PROCEDURE — 99214 OFFICE O/P EST MOD 30 MIN: CPT | Performed by: OBSTETRICS & GYNECOLOGY

## 2024-11-25 NOTE — TELEPHONE ENCOUNTER
Patient Name: Piyush Feldman   MRN: [id number]   Case#: [id number]   Surgeons and Role:      * Ce Rodríguez MD - Primary   Date requested: * No dates entered *   Location:  OR   Procedure(s):   Robot assisted Total Laparoscopic Hysterectomy, Bilateral Salpingectomy, left ovarian cystectomy (N/A)

## 2024-11-25 NOTE — PROGRESS NOTES
"  SUBJECTIVE:                                                   CC:  Patient presents with:  Follow Up: U/S      HPI:  Piyush Feldman is a 49 year old  who presents for ongoing discussion of known fibroid uterus and a growing left ovarian cyst.    She went abroad to the [facility] this fall was on Ortho-Cyclen at the time but stopped it because she got some swelling in her lower extremities.    She has a known history of 2 fibroids measuring 4.7 cm and 2.2 cm.  She has a left ovarian cyst that initially was 3 cm and simple appearing in 2023, then in  it was 4.7 cm and complex, and now just earlier this month it was measuring 5.3 cm.  She has had dysmenorrhea and AUB for many years.  We have previously discussed management options.  She had a EMB in 2024 which showed polypoid fragments of endometrium.    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  Condoms for contraception, not planning on any more pregnancies    PMH including hypertension treated with losartan.    Gyn History:  Patient's last menstrual period was 2024.       Using condoms for contraception.    Last 3 Pap and HPV Results:       Latest Ref Rng & Units 2020     4:09 PM 2020     4:02 PM   PAP / HPV   PAP (Historical)   NIL    HPV 16 DNA NEG^Negative Negative     HPV 18 DNA NEG^Negative Negative     Other HR HPV NEG^Negative Negative         PMH, PSH, Soc Hx, Fam Hx, Meds, and allergies reviewed in Epic.    OBJECTIVE:     BP (!) 150/90 (BP Location: Right arm, Patient Position: Chair, Cuff Size: Adult Regular)   Ht 1.626 m (5' 4\")   Wt 67.6 kg (149 lb)   LMP 2024   Breastfeeding No   BMI 25.58 kg/m      Gen: Healthy appearing female, no acute distress, comfortable  Psych: mentation appears normal and affect bright  : Deferred today, see prior note    Test Results:  [facility]     ULTRASOUND - PELVIC GYN- " Transabdominal and Transvaginal     Referring MD: Ce Rodríguez MD     ===================================     CLINICAL INFORMATION     Indications for ultrasound:   F/U Lt ovarian cyst     LMP: 11/06/2024    Hormones: OCP      Measurements:  Uterus:  5.55 x 7.40 x 5.45 cm     Position is anteverted and retroflexed.  Contour is irregular w/ myomata: 1 Posterior 4.25 x 4.28 x 4.08 cm.     Endo cav: 7.19 mm       Smooth/regular/wnl     Right ovary: 1.63 x 2.68 x 1.60 cm  Wnl  Left ovary:   5.48 x 3.82 x 3.80 cm Complex cyst 5.33 x 3.73 x 3.61 cm     Cul de sac: no free fluid     Technique: Transvaginal Imaging performed  Transabdominal Imaging performed     ===================================  Complete pelvic ultrasound using realtime   transabdominal and transvaginal scanning  Bladder appears normal      Myomatous uterus: Endometrial cavity possibly affected- 4 cm posterior fibroid noted  Consider MRI if clearer visualization needed      Normal right ovary  Normal endometrium      Complex left ovarian cyst, benign  appearing, to ensure resolution, consider repeat ultrasound as in 6-8 weeks if clinically relevant    ASSESSMENT/PLAN:                                                      1. Uterine leiomyoma, unspecified location (Primary)  - Case Request: Robot assisted Total Laparoscopic Hysterectomy, Bilateral Salpingectomy, left ovarian cystectomy    2. Left ovarian cyst  We again discussed the growth of the left ovarian cyst.  Is now been present for almost 2 years and has been growing and increasing complexity throughout that time.  It is now in the range that is optimal for ovarian torsion, and we discussed the differences of an emergency surgery versus a scheduled, procedure.  She is inclined to the left ovarian cystectomy.    - Case Request: Robot assisted Total Laparoscopic Hysterectomy, Bilateral Salpingectomy, left ovarian cystectomy    3. Dysmenorrhea  - Case Request: Robot assisted Total Laparoscopic  Hysterectomy, Bilateral Salpingectomy, left ovarian cystectomy    4. Abnormal uterine bleeding (AUB)  While also discussing the laparoscopic procedure for ovarian cyst removal, we again reviewed options for dysmenorrhea from fibroids.  We reviewed possibilities for medical management and surgical options such as myomectomy or hysterectomy.  At her age and desired fertility, I would not recommend a myomectomy procedure.  I also think that it may be slightly more morbid because it would need to be removed abdominally and would lead to a larger abdominal incision.  If she desires to have hysterectomy, it comes with many of the same risks of the procedure including bleeding, infection, injury to surrounding organs, but it would be more likely to be able to be removed vaginally and then not create a larger incision on her abdomen.  At this point, she has reviewed all of her options and is in agreement to proceed in the next couple of months for hysterectomy bilateral salpingectomy, left ovarian cystectomy.  - Case Request: Robot assisted Total Laparoscopic Hysterectomy, Bilateral Salpingectomy, left ovarian cystectomy    Ce Rodríguez MD, MPH  Obstetrics and Gynecology

## 2024-11-25 NOTE — NURSING NOTE
"Chief Complaint   Patient presents with    Follow Up     U/S       Initial BP (!) 150/90 (BP Location: Right arm, Patient Position: Chair, Cuff Size: Adult Regular)   Ht 1.626 m (5' 4\")   Wt 67.6 kg (149 lb)   LMP 2024   Breastfeeding No   BMI 25.58 kg/m   Estimated body mass index is 25.58 kg/m  as calculated from the following:    Height as of this encounter: 1.626 m (5' 4\").    Weight as of this encounter: 67.6 kg (149 lb).  BP completed using cuff size: large    Questioned patient about current smoking habits.  Pt. has never smoked.          The following HM Due: NONE    Suzi Fu CMA  "

## 2024-11-26 NOTE — TELEPHONE ENCOUNTER
Type of surgery: robot assisted total laparoscopic hysterectomy, bilateral salpingectomy, left ovarian cystectomy  Location of surgery: [facility]  Date and time of surgery: 1/22/25 @ 7:50 am  Surgeon: Dr. Henry Rodríguez  Pre-Op Appt Date: Patient advised to schedule.  Post-Op Appt Date:    Packet sent out: Yes  Pre-cert/Authorization completed:  No  Date: 11/26/24

## 2024-12-09 ENCOUNTER — TELEPHONE (OUTPATIENT)
Dept: FAMILY MEDICINE | Facility: CLINIC | Age: 49
End: 2024-12-09

## 2024-12-09 NOTE — TELEPHONE ENCOUNTER
Patient Quality Outreach    Patient is due for the following:   There are no preventive care reminders to display for this patient.    Action(s) Taken:   Schedule a nurse only visit for vaccines    Type of outreach:    Chart review performed, no outreach needed. Flu vaccine date found on care everywhere    Questions for provider review:    None           Jess Florez, CMA

## 2024-12-16 ENCOUNTER — MYC MEDICAL ADVICE (OUTPATIENT)
Dept: OBGYN | Facility: CLINIC | Age: 49
End: 2024-12-16
Payer: COMMERCIAL

## 2024-12-21 ENCOUNTER — MYC REFILL (OUTPATIENT)
Dept: INTERNAL MEDICINE | Facility: CLINIC | Age: 49
End: 2024-12-21
Payer: COMMERCIAL

## 2024-12-21 DIAGNOSIS — I10 BENIGN ESSENTIAL HYPERTENSION: ICD-10-CM

## 2024-12-24 RX ORDER — LOSARTAN POTASSIUM 50 MG/1
50 TABLET ORAL DAILY
Qty: 60 TABLET | Refills: 0 | Status: SHIPPED | OUTPATIENT
Start: 2024-12-24

## 2024-12-24 NOTE — TELEPHONE ENCOUNTER
Dr. Lucero Lazaro is ooo. Chart reviewed. Losartan refilled 60 days to get to upcoming appts. Follow up for further refills and BP management.    ROBEL Casarez CNP

## 2025-01-03 LAB
ALBUMIN SERPL BCG-MCNC: 4.3 G/DL (ref 3.5–5.2)
ALBUMIN UR-MCNC: 30 MG/DL
ALP SERPL-CCNC: 203 U/L (ref 40–150)
ALT SERPL W P-5'-P-CCNC: 235 U/L (ref 0–50)
AMORPH CRY #/AREA URNS HPF: ABNORMAL /HPF
ANION GAP SERPL CALCULATED.3IONS-SCNC: 13 MMOL/L (ref 7–15)
APPEARANCE UR: ABNORMAL
AST SERPL W P-5'-P-CCNC: 236 U/L (ref 0–45)
BACTERIA #/AREA URNS HPF: ABNORMAL /HPF
BASOPHILS # BLD AUTO: 0.1 10E3/UL (ref 0–0.2)
BASOPHILS NFR BLD AUTO: 0 %
BILIRUB SERPL-MCNC: 0.7 MG/DL
BILIRUB UR QL STRIP: NEGATIVE
BUN SERPL-MCNC: 12.2 MG/DL (ref 6–20)
CALCIUM SERPL-MCNC: 9.3 MG/DL (ref 8.8–10.4)
CHLORIDE SERPL-SCNC: 105 MMOL/L (ref 98–107)
COLOR UR AUTO: YELLOW
CREAT SERPL-MCNC: 0.53 MG/DL (ref 0.51–0.95)
EGFRCR SERPLBLD CKD-EPI 2021: >90 ML/MIN/1.73M2
EOSINOPHIL # BLD AUTO: 0 10E3/UL (ref 0–0.7)
EOSINOPHIL NFR BLD AUTO: 0 %
ERYTHROCYTE [DISTWIDTH] IN BLOOD BY AUTOMATED COUNT: 14.9 % (ref 10–15)
GLUCOSE SERPL-MCNC: 153 MG/DL (ref 70–99)
GLUCOSE UR STRIP-MCNC: NEGATIVE MG/DL
HCG SERPL QL: NEGATIVE
HCO3 SERPL-SCNC: 20 MMOL/L (ref 22–29)
HCT VFR BLD AUTO: 37.8 % (ref 35–47)
HGB BLD-MCNC: 11.9 G/DL (ref 11.7–15.7)
HGB UR QL STRIP: ABNORMAL
IMM GRANULOCYTES # BLD: 0 10E3/UL
IMM GRANULOCYTES NFR BLD: 0 %
KETONES UR STRIP-MCNC: NEGATIVE MG/DL
LEUKOCYTE ESTERASE UR QL STRIP: ABNORMAL
LYMPHOCYTES # BLD AUTO: 1.4 10E3/UL (ref 0.8–5.3)
LYMPHOCYTES NFR BLD AUTO: 12 %
MCH RBC QN AUTO: 27.9 PG (ref 26.5–33)
MCHC RBC AUTO-ENTMCNC: 31.5 G/DL (ref 31.5–36.5)
MCV RBC AUTO: 89 FL (ref 78–100)
MONOCYTES # BLD AUTO: 0.5 10E3/UL (ref 0–1.3)
MONOCYTES NFR BLD AUTO: 4 %
MUCOUS THREADS #/AREA URNS LPF: PRESENT /LPF
NEUTROPHILS # BLD AUTO: 9.5 10E3/UL (ref 1.6–8.3)
NEUTROPHILS NFR BLD AUTO: 83 %
NITRATE UR QL: NEGATIVE
NRBC # BLD AUTO: 0 10E3/UL
NRBC BLD AUTO-RTO: 0 /100
PH UR STRIP: 7.5 [PH] (ref 5–7)
PLATELET # BLD AUTO: 333 10E3/UL (ref 150–450)
POTASSIUM SERPL-SCNC: 4.1 MMOL/L (ref 3.4–5.3)
PROT SERPL-MCNC: 7.6 G/DL (ref 6.4–8.3)
RBC # BLD AUTO: 4.26 10E6/UL (ref 3.8–5.2)
RBC URINE: 10 /HPF
SODIUM SERPL-SCNC: 138 MMOL/L (ref 135–145)
SP GR UR STRIP: 1.02 (ref 1–1.03)
SQUAMOUS EPITHELIAL: 10 /HPF
TROPONIN T SERPL HS-MCNC: <6 NG/L
UROBILINOGEN UR STRIP-MCNC: 4 MG/DL
WBC # BLD AUTO: 11.5 10E3/UL (ref 4–11)
WBC URINE: 4 /HPF

## 2025-01-03 PROCEDURE — 85025 COMPLETE CBC W/AUTO DIFF WBC: CPT | Performed by: EMERGENCY MEDICINE

## 2025-01-03 PROCEDURE — 93005 ELECTROCARDIOGRAM TRACING: CPT

## 2025-01-03 PROCEDURE — 81001 URINALYSIS AUTO W/SCOPE: CPT | Performed by: EMERGENCY MEDICINE

## 2025-01-03 PROCEDURE — 83690 ASSAY OF LIPASE: CPT | Performed by: EMERGENCY MEDICINE

## 2025-01-03 PROCEDURE — 83036 HEMOGLOBIN GLYCOSYLATED A1C: CPT | Performed by: INTERNAL MEDICINE

## 2025-01-03 PROCEDURE — 84484 ASSAY OF TROPONIN QUANT: CPT | Performed by: EMERGENCY MEDICINE

## 2025-01-03 PROCEDURE — 84703 CHORIONIC GONADOTROPIN ASSAY: CPT | Performed by: EMERGENCY MEDICINE

## 2025-01-03 PROCEDURE — 84478 ASSAY OF TRIGLYCERIDES: CPT | Performed by: INTERNAL MEDICINE

## 2025-01-03 PROCEDURE — 36415 COLL VENOUS BLD VENIPUNCTURE: CPT | Performed by: EMERGENCY MEDICINE

## 2025-01-03 PROCEDURE — 99285 EMERGENCY DEPT VISIT HI MDM: CPT

## 2025-01-03 ASSESSMENT — COLUMBIA-SUICIDE SEVERITY RATING SCALE - C-SSRS
1. IN THE PAST MONTH, HAVE YOU WISHED YOU WERE DEAD OR WISHED YOU COULD GO TO SLEEP AND NOT WAKE UP?: NO
6. HAVE YOU EVER DONE ANYTHING, STARTED TO DO ANYTHING, OR PREPARED TO DO ANYTHING TO END YOUR LIFE?: NO
2. HAVE YOU ACTUALLY HAD ANY THOUGHTS OF KILLING YOURSELF IN THE PAST MONTH?: NO

## 2025-01-04 ENCOUNTER — APPOINTMENT (OUTPATIENT)
Dept: ULTRASOUND IMAGING | Facility: CLINIC | Age: 50
End: 2025-01-04
Attending: EMERGENCY MEDICINE
Payer: COMMERCIAL

## 2025-01-04 ENCOUNTER — APPOINTMENT (OUTPATIENT)
Dept: CT IMAGING | Facility: CLINIC | Age: 50
End: 2025-01-04
Attending: EMERGENCY MEDICINE
Payer: COMMERCIAL

## 2025-01-04 ENCOUNTER — HOSPITAL ENCOUNTER (OUTPATIENT)
Facility: CLINIC | Age: 50
Setting detail: OBSERVATION
Discharge: HOME | End: 2025-01-05
Attending: EMERGENCY MEDICINE | Admitting: INTERNAL MEDICINE
Payer: COMMERCIAL

## 2025-01-04 ENCOUNTER — APPOINTMENT (OUTPATIENT)
Dept: MRI IMAGING | Facility: CLINIC | Age: 50
End: 2025-01-04
Attending: PHYSICIAN ASSISTANT
Payer: COMMERCIAL

## 2025-01-04 DIAGNOSIS — N83.202 LEFT OVARIAN CYST: ICD-10-CM

## 2025-01-04 DIAGNOSIS — N63.0 BREAST NODULE: ICD-10-CM

## 2025-01-04 DIAGNOSIS — K85.10 ACUTE GALLSTONE PANCREATITIS: Primary | ICD-10-CM

## 2025-01-04 DIAGNOSIS — R10.9 ABDOMINAL PAIN, UNSPECIFIED ABDOMINAL LOCATION: ICD-10-CM

## 2025-01-04 DIAGNOSIS — K85.90 ACUTE PANCREATITIS, UNSPECIFIED COMPLICATION STATUS, UNSPECIFIED PANCREATITIS TYPE: ICD-10-CM

## 2025-01-04 DIAGNOSIS — R74.01 TRANSAMINITIS: ICD-10-CM

## 2025-01-04 LAB
ANION GAP SERPL CALCULATED.3IONS-SCNC: 12 MMOL/L (ref 7–15)
BUN SERPL-MCNC: 8.6 MG/DL (ref 6–20)
CALCIUM SERPL-MCNC: 9.1 MG/DL (ref 8.8–10.4)
CHLORIDE SERPL-SCNC: 104 MMOL/L (ref 98–107)
CREAT SERPL-MCNC: 0.49 MG/DL (ref 0.51–0.95)
EGFRCR SERPLBLD CKD-EPI 2021: >90 ML/MIN/1.73M2
ERYTHROCYTE [DISTWIDTH] IN BLOOD BY AUTOMATED COUNT: 14.8 % (ref 10–15)
EST. AVERAGE GLUCOSE BLD GHB EST-MCNC: 117 MG/DL
GLUCOSE SERPL-MCNC: 102 MG/DL (ref 70–99)
HBA1C MFR BLD: 5.7 %
HCO3 SERPL-SCNC: 22 MMOL/L (ref 22–29)
HCT VFR BLD AUTO: 35.3 % (ref 35–47)
HGB BLD-MCNC: 11.1 G/DL (ref 11.7–15.7)
HOLD SPECIMEN: NORMAL
LIPASE SERPL-CCNC: 2386 U/L (ref 13–60)
MCH RBC QN AUTO: 27.9 PG (ref 26.5–33)
MCHC RBC AUTO-ENTMCNC: 31.4 G/DL (ref 31.5–36.5)
MCV RBC AUTO: 89 FL (ref 78–100)
PLATELET # BLD AUTO: 315 10E3/UL (ref 150–450)
POTASSIUM SERPL-SCNC: 4.2 MMOL/L (ref 3.4–5.3)
RBC # BLD AUTO: 3.98 10E6/UL (ref 3.8–5.2)
SODIUM SERPL-SCNC: 138 MMOL/L (ref 135–145)
TRIGL SERPL-MCNC: 104 MG/DL
WBC # BLD AUTO: 11.8 10E3/UL (ref 4–11)

## 2025-01-04 PROCEDURE — 82435 ASSAY OF BLOOD CHLORIDE: CPT | Performed by: INTERNAL MEDICINE

## 2025-01-04 PROCEDURE — 96374 THER/PROPH/DIAG INJ IV PUSH: CPT

## 2025-01-04 PROCEDURE — 258N000003 HC RX IP 258 OP 636: Performed by: INTERNAL MEDICINE

## 2025-01-04 PROCEDURE — 76705 ECHO EXAM OF ABDOMEN: CPT

## 2025-01-04 PROCEDURE — 74183 MRI ABD W/O CNTR FLWD CNTR: CPT

## 2025-01-04 PROCEDURE — 250N000013 HC RX MED GY IP 250 OP 250 PS 637: Performed by: INTERNAL MEDICINE

## 2025-01-04 PROCEDURE — 80048 BASIC METABOLIC PNL TOTAL CA: CPT | Performed by: INTERNAL MEDICINE

## 2025-01-04 PROCEDURE — 96375 TX/PRO/DX INJ NEW DRUG ADDON: CPT

## 2025-01-04 PROCEDURE — 255N000002 HC RX 255 OP 636: Performed by: PHYSICIAN ASSISTANT

## 2025-01-04 PROCEDURE — 99221 1ST HOSP IP/OBS SF/LOW 40: CPT | Mod: AI | Performed by: INTERNAL MEDICINE

## 2025-01-04 PROCEDURE — G0378 HOSPITAL OBSERVATION PER HR: HCPCS

## 2025-01-04 PROCEDURE — 84520 ASSAY OF UREA NITROGEN: CPT | Performed by: INTERNAL MEDICINE

## 2025-01-04 PROCEDURE — 47563 LAPARO CHOLECYSTECTOMY/GRAPH: CPT | Mod: AS | Performed by: PHYSICIAN ASSISTANT

## 2025-01-04 PROCEDURE — 250N000011 HC RX IP 250 OP 636: Performed by: EMERGENCY MEDICINE

## 2025-01-04 PROCEDURE — 250N000011 HC RX IP 250 OP 636: Mod: JZ | Performed by: INTERNAL MEDICINE

## 2025-01-04 PROCEDURE — 36415 COLL VENOUS BLD VENIPUNCTURE: CPT | Performed by: INTERNAL MEDICINE

## 2025-01-04 PROCEDURE — 74177 CT ABD & PELVIS W/CONTRAST: CPT

## 2025-01-04 PROCEDURE — 85018 HEMOGLOBIN: CPT | Performed by: INTERNAL MEDICINE

## 2025-01-04 PROCEDURE — 99204 OFFICE O/P NEW MOD 45 MIN: CPT | Mod: 57 | Performed by: SURGERY

## 2025-01-04 PROCEDURE — A9585 GADOBUTROL INJECTION: HCPCS | Performed by: PHYSICIAN ASSISTANT

## 2025-01-04 RX ORDER — HYDROMORPHONE HYDROCHLORIDE 1 MG/ML
0.3 INJECTION, SOLUTION INTRAMUSCULAR; INTRAVENOUS; SUBCUTANEOUS ONCE
Status: DISCONTINUED | OUTPATIENT
Start: 2025-01-04 | End: 2025-01-04

## 2025-01-04 RX ORDER — KETOROLAC TROMETHAMINE 15 MG/ML
15 INJECTION, SOLUTION INTRAMUSCULAR; INTRAVENOUS ONCE
Status: COMPLETED | OUTPATIENT
Start: 2025-01-04 | End: 2025-01-04

## 2025-01-04 RX ORDER — SODIUM CHLORIDE 9 MG/ML
INJECTION, SOLUTION INTRAVENOUS ONCE
Status: DISCONTINUED | OUTPATIENT
Start: 2025-01-04 | End: 2025-01-04

## 2025-01-04 RX ORDER — OXYCODONE HYDROCHLORIDE 5 MG/1
5 TABLET ORAL EVERY 4 HOURS PRN
Status: DISCONTINUED | OUTPATIENT
Start: 2025-01-04 | End: 2025-01-05 | Stop reason: HOSPADM

## 2025-01-04 RX ORDER — ONDANSETRON 4 MG/1
4 TABLET, ORALLY DISINTEGRATING ORAL EVERY 6 HOURS PRN
Status: DISCONTINUED | OUTPATIENT
Start: 2025-01-04 | End: 2025-01-05 | Stop reason: HOSPADM

## 2025-01-04 RX ORDER — MORPHINE SULFATE 2 MG/ML
1 INJECTION, SOLUTION INTRAMUSCULAR; INTRAVENOUS
Status: DISCONTINUED | OUTPATIENT
Start: 2025-01-04 | End: 2025-01-05 | Stop reason: HOSPADM

## 2025-01-04 RX ORDER — AMOXICILLIN 250 MG
2 CAPSULE ORAL 2 TIMES DAILY PRN
Status: DISCONTINUED | OUTPATIENT
Start: 2025-01-04 | End: 2025-01-05 | Stop reason: HOSPADM

## 2025-01-04 RX ORDER — SODIUM CHLORIDE 9 MG/ML
INJECTION, SOLUTION INTRAVENOUS CONTINUOUS
Status: DISCONTINUED | OUTPATIENT
Start: 2025-01-04 | End: 2025-01-04

## 2025-01-04 RX ORDER — NALOXONE HYDROCHLORIDE 0.4 MG/ML
0.2 INJECTION, SOLUTION INTRAMUSCULAR; INTRAVENOUS; SUBCUTANEOUS
Status: DISCONTINUED | OUTPATIENT
Start: 2025-01-04 | End: 2025-01-05 | Stop reason: HOSPADM

## 2025-01-04 RX ORDER — IOPAMIDOL 755 MG/ML
500 INJECTION, SOLUTION INTRAVASCULAR ONCE
Status: COMPLETED | OUTPATIENT
Start: 2025-01-04 | End: 2025-01-04

## 2025-01-04 RX ORDER — AMOXICILLIN 250 MG
1 CAPSULE ORAL 2 TIMES DAILY PRN
Status: DISCONTINUED | OUTPATIENT
Start: 2025-01-04 | End: 2025-01-05 | Stop reason: HOSPADM

## 2025-01-04 RX ORDER — NALOXONE HYDROCHLORIDE 0.4 MG/ML
0.4 INJECTION, SOLUTION INTRAMUSCULAR; INTRAVENOUS; SUBCUTANEOUS
Status: DISCONTINUED | OUTPATIENT
Start: 2025-01-04 | End: 2025-01-05 | Stop reason: HOSPADM

## 2025-01-04 RX ORDER — SODIUM CHLORIDE 9 MG/ML
INJECTION, SOLUTION INTRAVENOUS CONTINUOUS
Status: ACTIVE | OUTPATIENT
Start: 2025-01-04 | End: 2025-01-04

## 2025-01-04 RX ORDER — MORPHINE SULFATE 2 MG/ML
2 INJECTION, SOLUTION INTRAMUSCULAR; INTRAVENOUS
Status: DISCONTINUED | OUTPATIENT
Start: 2025-01-04 | End: 2025-01-05 | Stop reason: HOSPADM

## 2025-01-04 RX ORDER — GADOBUTROL 604.72 MG/ML
7 INJECTION INTRAVENOUS ONCE
Status: COMPLETED | OUTPATIENT
Start: 2025-01-04 | End: 2025-01-04

## 2025-01-04 RX ORDER — ONDANSETRON 2 MG/ML
4 INJECTION INTRAMUSCULAR; INTRAVENOUS ONCE
Status: COMPLETED | OUTPATIENT
Start: 2025-01-04 | End: 2025-01-04

## 2025-01-04 RX ORDER — HYDROMORPHONE HYDROCHLORIDE 1 MG/ML
0.5 INJECTION, SOLUTION INTRAMUSCULAR; INTRAVENOUS; SUBCUTANEOUS ONCE
Status: COMPLETED | OUTPATIENT
Start: 2025-01-04 | End: 2025-01-04

## 2025-01-04 RX ORDER — ACETAMINOPHEN 325 MG/1
650 TABLET ORAL EVERY 4 HOURS PRN
Status: DISCONTINUED | OUTPATIENT
Start: 2025-01-04 | End: 2025-01-05 | Stop reason: HOSPADM

## 2025-01-04 RX ORDER — PROCHLORPERAZINE MALEATE 5 MG/1
10 TABLET ORAL EVERY 6 HOURS PRN
Status: DISCONTINUED | OUTPATIENT
Start: 2025-01-04 | End: 2025-01-05 | Stop reason: HOSPADM

## 2025-01-04 RX ORDER — LOSARTAN POTASSIUM 50 MG/1
50 TABLET ORAL EVERY EVENING
Status: DISCONTINUED | OUTPATIENT
Start: 2025-01-04 | End: 2025-01-05 | Stop reason: HOSPADM

## 2025-01-04 RX ORDER — ONDANSETRON 2 MG/ML
4 INJECTION INTRAMUSCULAR; INTRAVENOUS EVERY 6 HOURS PRN
Status: DISCONTINUED | OUTPATIENT
Start: 2025-01-04 | End: 2025-01-05 | Stop reason: HOSPADM

## 2025-01-04 RX ADMIN — HYDROMORPHONE HYDROCHLORIDE 0.5 MG: 1 INJECTION, SOLUTION INTRAMUSCULAR; INTRAVENOUS; SUBCUTANEOUS at 01:40

## 2025-01-04 RX ADMIN — GADOBUTROL 7 ML: 604.72 INJECTION INTRAVENOUS at 12:12

## 2025-01-04 RX ADMIN — SODIUM CHLORIDE: 9 INJECTION, SOLUTION INTRAVENOUS at 13:53

## 2025-01-04 RX ADMIN — MORPHINE SULFATE 2 MG: 2 INJECTION, SOLUTION INTRAMUSCULAR; INTRAVENOUS at 04:22

## 2025-01-04 RX ADMIN — ONDANSETRON 4 MG: 2 INJECTION INTRAMUSCULAR; INTRAVENOUS at 01:40

## 2025-01-04 RX ADMIN — SODIUM CHLORIDE: 9 INJECTION, SOLUTION INTRAVENOUS at 04:26

## 2025-01-04 RX ADMIN — KETOROLAC TROMETHAMINE 15 MG: 15 INJECTION, SOLUTION INTRAMUSCULAR; INTRAVENOUS at 01:40

## 2025-01-04 RX ADMIN — IOPAMIDOL 75 ML: 755 INJECTION, SOLUTION INTRAVENOUS at 02:57

## 2025-01-04 RX ADMIN — LOSARTAN POTASSIUM 50 MG: 50 TABLET, FILM COATED ORAL at 18:35

## 2025-01-04 ASSESSMENT — ACTIVITIES OF DAILY LIVING (ADL)
ADLS_ACUITY_SCORE: 41
ADLS_ACUITY_SCORE: 41
ADLS_ACUITY_SCORE: 35
ADLS_ACUITY_SCORE: 35
ADLS_ACUITY_SCORE: 41
ADLS_ACUITY_SCORE: 35
ADLS_ACUITY_SCORE: 41
ADLS_ACUITY_SCORE: 41
ADLS_ACUITY_SCORE: 44
ADLS_ACUITY_SCORE: 41
ADLS_ACUITY_SCORE: 44
ADLS_ACUITY_SCORE: 41
ADLS_ACUITY_SCORE: 35
ADLS_ACUITY_SCORE: 41
ADLS_ACUITY_SCORE: 35
ADLS_ACUITY_SCORE: 41
ADLS_ACUITY_SCORE: 35
ADLS_ACUITY_SCORE: 44
ADLS_ACUITY_SCORE: 41
ADLS_ACUITY_SCORE: 35
ADLS_ACUITY_SCORE: 41

## 2025-01-04 NOTE — ED NOTES
[facility]  ED Nurse Handoff Report    ED Chief complaint: Abdominal Pain  . ED Diagnosis:   Final diagnoses:   Abdominal pain, unspecified abdominal location   Acute pancreatitis, unspecified complication status, unspecified pancreatitis type   Transaminitis   Left ovarian cyst   Breast nodule       Allergies: No Known Allergies    Code Status: Full Code    Activity level - Baseline/Home:  independent.  Activity Level - Current:   standby.   Lift room needed: No.   Bariatric: No   Needed: No   Isolation: No.   Infection: Not Applicable.     Respiratory status: Room air    Vital Signs (within 30 minutes):   Vitals:    01/04/25 0815 01/04/25 0820 01/04/25 0825 01/04/25 0830   BP:       Pulse:       Resp:       Temp:       TempSrc:       SpO2: 100% 100% 100% 100%   Weight:       Height:           Cardiac Rhythm:  ,      Pain level:    Patient confused: No.   Patient Falls Risk: nonskid shoes/slippers when out of bed.   Elimination Status: Has voided     Patient Report - Initial Complaint: Pt with abd pain.   Focused Assessment: Pt with upper abd pain with nausea     Abnormal Results:   Labs Ordered and Resulted from Time of ED Arrival to Time of ED Departure   COMPREHENSIVE METABOLIC PANEL - Abnormal       Result Value    Sodium 138      Potassium 4.1      Carbon Dioxide (CO2) 20 (*)     Anion Gap 13      Urea Nitrogen 12.2      Creatinine 0.53      GFR Estimate >90      Calcium 9.3      Chloride 105      Glucose 153 (*)     Alkaline Phosphatase 203 (*)      (*)      (*)     Protein Total 7.6      Albumin 4.3      Bilirubin Total 0.7     LIPASE - Abnormal    Lipase 2,386 (*)    ROUTINE UA WITH MICROSCOPIC REFLEX TO CULTURE - Abnormal    Color Urine Yellow      Appearance Urine Cloudy (*)     Glucose Urine Negative      Bilirubin Urine Negative      Ketones Urine Negative      Specific Gravity Urine 1.023      Blood Urine Small (*)     pH Urine 7.5 (*)     Protein Albumin  Urine 30 (*)     Urobilinogen Urine 4.0 (*)     Nitrite Urine Negative      Leukocyte Esterase Urine Trace (*)     Bacteria Urine Few (*)     Mucus Urine Present (*)     Amorphous Crystals Urine Few (*)     RBC Urine 10 (*)     WBC Urine 4      Squamous Epithelials Urine 10 (*)    CBC WITH PLATELETS AND DIFFERENTIAL - Abnormal    WBC Count 11.5 (*)     RBC Count 4.26      Hemoglobin 11.9      Hematocrit 37.8      MCV 89      MCH 27.9      MCHC 31.5      RDW 14.9      Platelet Count 333      % Neutrophils 83      % Lymphocytes 12      % Monocytes 4      % Eosinophils 0      % Basophils 0      % Immature Granulocytes 0      NRBCs per 100 WBC 0      Absolute Neutrophils 9.5 (*)     Absolute Lymphocytes 1.4      Absolute Monocytes 0.5      Absolute Eosinophils 0.0      Absolute Basophils 0.1      Absolute Immature Granulocytes 0.0      Absolute NRBCs 0.0     HEMOGLOBIN A1C - Abnormal    Estimated Average Glucose 117 (*)     Hemoglobin A1C 5.7 (*)    HCG QUALITATIVE PREGNANCY - Normal    hCG Serum Qualitative Negative     TROPONIN T, HIGH SENSITIVITY - Normal    Troponin T, High Sensitivity <6     TRIGLYCERIDES   BASIC METABOLIC PANEL   CBC WITH PLATELETS        CT Abdomen Pelvis w Contrast   Final Result   IMPRESSION:    1.  There is a 4.8 cm left ovarian cyst and probable left hydrosalpinx.   2.  The uterus is enlarged by multiple fibroids.   3.  Endometrial polyp or fibroid.   4.  No bowel obstruction or inflammation.   5.  10 mm soft tissue nodule in the right breast. Mammography follow-up recommended.      REFERENCE:   Management of Incidental Adnexal Findings on CT and MRI: A White Paper of the ACR Incidental Findings Committee. J Am Dayna Radiol 2020; 17(2):248-254.      Premenopausal or <50 if status unkown:      Equal to or <5 cm: No further imaging.   >5 cm on CT: Ultrasound.   >5 cm on MRI: Ultrasound in 6-12 months if not fully characterized. No follow-up if fully characterized.   >7 cm on MRI: Ultrasound in  6-12 months even if fully characterized.            US Abdomen Limited   Final Result   IMPRESSION:   1.  Gallbladder sludge, cholelithiasis, and adenomyomatosis. No sonographic evidence of acute disease, however.   2.  Hepatic steatosis.                Treatments provided: Meds   Family Comments: Sig other at bedside  OBS brochure/video discussed/provided to patient:  Yes  ED Medications:   Medications   senna-docusate (SENOKOT-S/PERICOLACE) 8.6-50 MG per tablet 1 tablet (has no administration in time range)     Or   senna-docusate (SENOKOT-S/PERICOLACE) 8.6-50 MG per tablet 2 tablet (has no administration in time range)   ondansetron (ZOFRAN ODT) ODT tab 4 mg (has no administration in time range)     Or   ondansetron (ZOFRAN) injection 4 mg (has no administration in time range)   prochlorperazine (COMPAZINE) injection 10 mg (has no administration in time range)     Or   prochlorperazine (COMPAZINE) tablet 10 mg (has no administration in time range)   morphine (PF) injection 1 mg (has no administration in time range)   morphine (PF) injection 2 mg (2 mg Intravenous $Given 1/4/25 0422)   sodium chloride 0.9 % infusion ( Intravenous $New Bag 1/4/25 0426)   ketorolac (TORADOL) injection 15 mg (15 mg Intravenous $Given 1/4/25 0140)   ondansetron (ZOFRAN) injection 4 mg (4 mg Intravenous $Given 1/4/25 0140)   HYDROmorphone (PF) (DILAUDID) injection 0.5 mg (0.5 mg Intravenous $Given 1/4/25 0140)   sodium chloride (PF) 0.9% PF flush 100 mL (59 mLs Intravenous $Given 1/4/25 0258)   iopamidol (ISOVUE-370) solution 500 mL (75 mLs Intravenous $Given 1/4/25 0257)       Drips infusing:  No  For the majority of the shift this patient was Green.   Interventions performed were N/A.    Sepsis treatment initiated: No    Cares/treatment/interventions/medications to be completed following ED care:See [facility]    ED Nurse Name: Azalea Murguia RN  1:47 AM     AM UPDATE  Patient improved with pain meds. Up with SBA, IVF infusing.   Esther  BURT Briseno RN on 1/4/2025 at 8:38 AM

## 2025-01-04 NOTE — H&P
"[facility]    History and Physical - Hospitalist Service       Date of Admission:  1/4/2025    Assessment & Plan      Piyush Feldman is a 49 year old female admitted on 1/4/2025. She has PMH most notable for HTN and presents with abdominal pain due to acute pancreatitis.    #Elevated LFTs  #Acute pancreatitis  The patient presents with acute onset of abdominal pain.  Lipase elevated to 2386.  , , alk phos 203, T. bili normal.  RUQ US with sludge, cholelithiasis and hepatic steatosis.  No CBD dilatation.  Query whether the patient had a gallstone or sludge trapped in the common bile duct which she has now passed.  She does not use alcohol and thus suspicion for alcohol pancreatitis is low.  Alternatively elevated transaminases could also be due to hepatic steatosis.  No sign of cholangitis or choledocholithiasis at this time.  -N.p.o., MIVF with NS at 150 mL/hour, antiemetics and IV morphine as needed  -CTAP pending  -GI consult  -The patient may need general surgery consult, I have explained to her that it is a possibility she may need her gallbladder removed  -Add on triglycerides    #Hyperglycemia  -Glucose 153 on admission  -Obtain A1c    #HTN  - Hold PTA losartan    #Ovarian cyst/fibroids  - The patient states she is due to have surgery soon to remove fibroids and ovarian cyst    #Right breast nodule  - CTAP with 10 mm soft tissue nodule in the right breast.  Will need outpatient mammography        Diet: NPO for Medical/Clinical Reasons Except for: Meds    DVT Prophylaxis: Ambulate every shift  Stroud Catheter: Not present  Lines: None     Cardiac Monitoring: None  Code Status:  Full code, discussed on admission    Clinically Significant Risk Factors Present on Admission                   # Hypertension: Noted on problem list           # Overweight: Estimated body mass index is 25.62 kg/m  as calculated from the following:    Height as of this encounter: 1.626 m (5' 4\").    " Weight as of this encounter: 67.7 kg (149 lb 4 oz).              Disposition Plan     Medically Ready for Discharge: Anticipated Tomorrow           Diogo Wilson MD  Hospitalist Service  [facility]  Securely message with Jose Eduardo (more info)  Text page via [facility] Paging/Directory     ______________________________________________________________________    Chief Complaint   Abdominal pain    History is obtained from the patient    History of Present Illness   Piyush Feldman is a 49 year old female who has PMH most notable for HTN that presents with abdominal pain.    The patient had acute onset of severe crampy persistent epigastric pain that began at 1800 last night.  The pain radiates into her chest and bilateral shoulders.  Associated nausea but no emesis.  No diarrhea or constipation.  She had a similar episode 2 months ago.  No fevers.  No dyspnea or urinary symptoms.    ER course:  -AF, HR 80s, RR 20, BP 140s/90s and on RA  -Glucose 153, creatinine normal, WBC 11.5  -Transaminases in 230s, alk phos 203, T. bili normal, lipase 2386  -hCG negative  -Troponin negative  - WBC 11.5  - RUQ US with cholelithiasis, hepatic steatosis and sludge, CBD normal  -Given Toradol and started on MIVF        Past Medical History    Past Medical History:   Diagnosis Date    Hypertension        Past Surgical History   Past Surgical History:   Procedure Laterality Date    AS REMOVAL OF OVARIAN CYST(S) Left 2016    GYN SURGERY  2016       Prior to Admission Medications   Prior to Admission Medications   Prescriptions Last Dose Informant Patient Reported? Taking?   Ascorbic Acid (VITAMIN C PO)   Yes No   Sig: Take by mouth.   Cyanocobalamin (VITAMIN B-12 PO)   Yes No   Sig: Take by mouth.   Multiple Vitamin (MULTIVITAMIN ADULT PO)   Yes No   VITAMIN D PO   Yes No   Sig: Take by mouth.   ferrous sulfate (FEROSUL) 325 (65 Fe) MG tablet   Yes No   losartan (COZAAR) 50 MG tablet   No No   Sig: Take 1 tablet (50  mg) by mouth daily.   norgestimate-ethinyl estradiol (ORTHO-CYCLEN) 0.25-35 MG-MCG tablet   No No   Sig: Take 1 tablet by mouth daily.      Facility-Administered Medications: None        Review of Systems    The 10 point Review of Systems is negative other than noted in the HPI or here.     Social History   I have reviewed this patient's social history and updated it with pertinent information if needed.  Social History     Tobacco Use    Smoking status: Never    Smokeless tobacco: Never   Vaping Use    Vaping status: Never Used   Substance Use Topics    Alcohol use: Never    Drug use: Never         Family History   I have reviewed this patient's family history and updated it with pertinent information if needed.  Family History   Problem Relation Age of Onset    Hypertension Mother     Diabetes Father     Hypertension Father     Breast Cancer Sister 46    Breast Cancer Sister     Hypertension Brother     Colon Cancer No family hx of          Allergies   No Known Allergies     Physical Exam   Vital Signs: Temp: 98.4  F (36.9  C) Temp src: Temporal BP: (!) 149/96 Pulse: 83   Resp: 20 SpO2: 100 % O2 Device: None (Room air)    Weight: 149 lbs 4.02 oz    GENERAL: Lying in bed, no distress, appears stated age =  HEENT: NC/AT, sclera anicteric   CV: RRR, no M/R/G, CR < 2 s   PULM: CTAB, no wheezes, rales, rhonchi   GI: Abd soft, NT, ND, no involuntary or voluntary guarding, no pain when I bumped the bed, not an acute abdomen   MSK: WWP, no LE edema   NEURO: Awake, alert, oriented to 1/4/2025, CN II-XII grossly intact, ANDERSON, appears nonfocal  SKIN: no rash           Medical Decision Making       40 MINUTES SPENT BY ME on the date of service doing chart review, history, exam, documentation & further activities per the note.      Data     I have personally reviewed the following data over the past 24 hrs:    11.5 (H)  \   11.9   / 333     138 105 12.2 /  153 (H)   4.1 20 (L) 0.53 \     ALT: 235 (H) AST: 236 (H) AP: 203 (H)  TBILI: 0.7   ALB: 4.3 TOT PROTEIN: 7.6 LIPASE: 2,386 (H)     Trop: <6 BNP: N/A       Imaging results reviewed over the past 24 hrs:   Recent Results (from the past 24 hours)   US Abdomen Limited    Narrative    EXAM: US ABDOMEN LIMITED  LOCATION: [facility]  DATE: 1/4/2025    INDICATION: ruq pain  COMPARISON: None.  TECHNIQUE: Limited abdominal ultrasound.    FINDINGS:    GALLBLADDER: Sludge with multiple calculi and shadowing, echogenic foci along the gallbladder wall representing adenomyomatosis. No sonographic Lee sign.    BILE DUCTS: No biliary dilatation. The common duct measures 4 mm.    LIVER: Increased hepatic echogenicity suggests steatosis. No focal mass.    RIGHT KIDNEY: No hydronephrosis.    PANCREAS: The visualized portions are normal.    No ascites.      Impression    IMPRESSION:  1.  Gallbladder sludge, cholelithiasis, and adenomyomatosis. No sonographic evidence of acute disease, however.  2.  Hepatic steatosis.

## 2025-01-04 NOTE — ED NOTES
"[facility]  ED Nurse Handoff Report    ED Chief complaint: Abdominal Pain  . ED Diagnosis:   Final diagnoses:   Abdominal pain, unspecified abdominal location   Acute pancreatitis, unspecified complication status, unspecified pancreatitis type   Transaminitis   Left ovarian cyst       Allergies: No Known Allergies    Code Status: Full Code    Activity level - Baseline/Home:  independent.  Activity Level - Current:   independent.   Lift room needed: No.   Bariatric: No   Needed: No   Isolation: No.   Infection: Not Applicable.     Respiratory status: Room air    Vital Signs (within 30 minutes):   Vitals:    01/03/25 2304   BP: (!) 149/96   Pulse: 83   Resp: 20   Temp: 98.4  F (36.9  C)   TempSrc: Temporal   SpO2: 100%   Weight: 67.7 kg (149 lb 4 oz)   Height: 1.626 m (5' 4\")       Cardiac Rhythm:  ,      Pain level:    Patient confused: No.   Patient Falls Risk: patient and family education.   Elimination Status: Has voided     Patient Report - Initial Complaint: Abdominal Pain.   Focused Assessment: Piyush Feldman is a 49 year old female with history of hypertension who presents to the ED with for evaluation of abdominal pain.  Patient reports around 6 PM developing abdominal pain primarily in the upper quadrants.  She reports the pain occasionally radiates to her back.  She reports accompanying nausea though no fever, chills, cough, dyspnea, vomiting, diarrhea or urinary complaints.  She tried Tums with minimal relief.  She reports symptoms came on after eating 2 slices of a mellow and popcorn.  She does report a history of a similar episode in the past though denies any history of gallstones, chronic alcohol.  She works as a nurse at [employer].      Abnormal Results:   Labs Ordered and Resulted from Time of ED Arrival to Time of ED Departure   COMPREHENSIVE METABOLIC PANEL - Abnormal       Result Value    Sodium 138      Potassium 4.1      Carbon Dioxide (CO2) 20 (*)     Anion Gap 13  "     Urea Nitrogen 12.2      Creatinine 0.53      GFR Estimate >90      Calcium 9.3      Chloride 105      Glucose 153 (*)     Alkaline Phosphatase 203 (*)      (*)      (*)     Protein Total 7.6      Albumin 4.3      Bilirubin Total 0.7     LIPASE - Abnormal    Lipase 2,386 (*)    ROUTINE UA WITH MICROSCOPIC REFLEX TO CULTURE - Abnormal    Color Urine Yellow      Appearance Urine Cloudy (*)     Glucose Urine Negative      Bilirubin Urine Negative      Ketones Urine Negative      Specific Gravity Urine 1.023      Blood Urine Small (*)     pH Urine 7.5 (*)     Protein Albumin Urine 30 (*)     Urobilinogen Urine 4.0 (*)     Nitrite Urine Negative      Leukocyte Esterase Urine Trace (*)     Bacteria Urine Few (*)     Mucus Urine Present (*)     Amorphous Crystals Urine Few (*)     RBC Urine 10 (*)     WBC Urine 4      Squamous Epithelials Urine 10 (*)    CBC WITH PLATELETS AND DIFFERENTIAL - Abnormal    WBC Count 11.5 (*)     RBC Count 4.26      Hemoglobin 11.9      Hematocrit 37.8      MCV 89      MCH 27.9      MCHC 31.5      RDW 14.9      Platelet Count 333      % Neutrophils 83      % Lymphocytes 12      % Monocytes 4      % Eosinophils 0      % Basophils 0      % Immature Granulocytes 0      NRBCs per 100 WBC 0      Absolute Neutrophils 9.5 (*)     Absolute Lymphocytes 1.4      Absolute Monocytes 0.5      Absolute Eosinophils 0.0      Absolute Basophils 0.1      Absolute Immature Granulocytes 0.0      Absolute NRBCs 0.0     HCG QUALITATIVE PREGNANCY - Normal    hCG Serum Qualitative Negative     TROPONIN T, HIGH SENSITIVITY - Normal    Troponin T, High Sensitivity <6          CT Abdomen Pelvis w Contrast   Final Result   IMPRESSION:    1.  There is a 4.8 cm left ovarian cyst and probable left hydrosalpinx.   2.  The uterus is enlarged by multiple fibroids.   3.  Endometrial polyp or fibroid.   4.  No bowel obstruction or inflammation.   5.  10 mm soft tissue nodule in the right breast. Mammography  follow-up recommended.      REFERENCE:   Management of Incidental Adnexal Findings on CT and MRI: A White Paper of the ACR Incidental Findings Committee. J Am Dayna Radiol 2020; 17(2):248-254.      Premenopausal or <50 if status unkown:      Equal to or <5 cm: No further imaging.   >5 cm on CT: Ultrasound.   >5 cm on MRI: Ultrasound in 6-12 months if not fully characterized. No follow-up if fully characterized.   >7 cm on MRI: Ultrasound in 6-12 months even if fully characterized.            US Abdomen Limited   Final Result   IMPRESSION:   1.  Gallbladder sludge, cholelithiasis, and adenomyomatosis. No sonographic evidence of acute disease, however.   2.  Hepatic steatosis.                Treatments provided: Pain Control  Family Comments: Spouse at bedside  OBS brochure/video discussed/provided to patient:  Yes  ED Medications:   Medications   HYDROmorphone (PF) (DILAUDID) injection 0.3 mg (has no administration in time range)   sodium chloride 0.9 % infusion (has no administration in time range)   ketorolac (TORADOL) injection 15 mg (15 mg Intravenous $Given 1/4/25 0140)   ondansetron (ZOFRAN) injection 4 mg (4 mg Intravenous $Given 1/4/25 0140)   HYDROmorphone (PF) (DILAUDID) injection 0.5 mg (0.5 mg Intravenous $Given 1/4/25 0140)   sodium chloride (PF) 0.9% PF flush 100 mL (59 mLs Intravenous $Given 1/4/25 0258)   iopamidol (ISOVUE-370) solution 500 mL (75 mLs Intravenous $Given 1/4/25 0257)       Drips infusing:  No  For the majority of the shift this patient was Green.   Interventions performed were NA.    Sepsis treatment initiated: No    Cares/treatment/interventions/medications to be completed following ED care: Pain Control    ED Nurse Name: Nikolai Soler RN  3:52 AM

## 2025-01-04 NOTE — PROGRESS NOTES
ROOM # 202-2    Living Situation (if not independent, order SW consult):  Facility name:  : [facility], spouse     Activity level at baseline: Independent  Activity level on admit: Independent    Who will be transporting you at discharge: [facility]    Patient registered to observation; given Patient Bill of Rights; given the opportunity to ask questions about observation status and their plan of care.  Patient has been oriented to the observation room, bathroom and call light is in place.    Discussed discharge goals and expectations with patient/family.

## 2025-01-04 NOTE — PROGRESS NOTES
Care assumed  Admitted this morning by my partner  Here with acute pancreatitis with elevated LFTs highly concerning for gallstone pancreatitis.  AUS with sludge and gallstones.      No significant etoh use, TG  104    Appreciate [facility] input - s/p MRCP and await results, if positive will get set up for ERCP in am.   Ordered prn apap, oxy    Otherwise agree with HandP outlined by Dr Wilson    Addendum:  MRCP with gallstone filled GB and no biliary obstruction  Surg consult in  Ok for clears

## 2025-01-04 NOTE — PLAN OF CARE
PRIMARY DIAGNOSIS: GALLSTONE INDUCED PANCREATITIS  OUTPATIENT/OBSERVATION GOALS TO BE MET BEFORE DISCHARGE:    1. Pain status: Improved-controlled with oral pain medications.  2. Stable vital signs and labs (if performed) at disposition: Yes  3. Tolerating adequate PO diet:  Clears - tolerated  4. Successful cholecystectomy or clear follow up plan with General Surgery team if immediate surgery not performed  General surgery consulted  5. ADLs back to baseline?  Yes  6. Activity and level of assistance: Ambulating independently.  7. Barriers to discharge noted Yes Intervention?    Discharge Planner Nurse   Safe discharge environment identified: Yes  Barriers to discharge: Yes       Entered by: Esther White RN 01/04/2025 5:27 PM  Pt is A/O x4, up independent in the room. Admitted with pancreatitis d/t gallstones. States pain is 2/10 in RUQ and epigastric areas. No nausea, tolerated clears. MRCP done, see results. IVF running at 100. General surgery consulted and GI following.   Please review provider order for any additional goals.   Nurse to notify provider when observation goals have been met and patient is ready for discharge.

## 2025-01-04 NOTE — ED PROVIDER NOTES
"  Emergency Department Note      History of Present Illness     Chief Complaint  Abdominal Pain    HPI  Piyush Feldman is a 49 year old female with history of hypertension who presents to the ED with for evaluation of abdominal pain.  Patient reports around 6 PM developing abdominal pain primarily in the upper quadrants.  She reports the pain occasionally radiates to her back.  She reports accompanying nausea though no fever, chills, cough, dyspnea, vomiting, diarrhea or urinary complaints.  She tried Tums with minimal relief.  She reports symptoms came on after eating 2 slices of a mellow and popcorn.  She does report a history of a similar episode in the past though denies any history of gallstones, chronic alcohol.  She works as a nurse at [employer].    Independent Historian  None    Review of External Notes  None  Past Medical History   Medical History and Problem List   Hypertension  Endometriosis   Left ovarian cyst    Medications   Losartan  Norgestimate-ethinyl estradiol     Surgical History   Removal of ovarian cyst's left  Physical Exam   Patient Vitals for the past 24 hrs:   BP Temp Temp src Pulse Resp SpO2 Height Weight   01/03/25 2304 (!) 149/96 98.4  F (36.9  C) Temporal 83 20 100 % 1.626 m (5' 4\") 67.7 kg (149 lb 4 oz)     Physical Exam  Nursing note and vitals reviewed.  Constitutional: Well nourished.   Eyes: Conjunctiva normal.  Pupils are equal, round, and reactive to light.   ENT: Nose normal. Mucous membranes pink and moist.    Neck: Normal range of motion.  CVS: Normal rate, regular rhythm.  Normal heart sounds.    Pulmonary: Lungs clear to auscultation bilaterally. No wheezes/rales/rhonchi.  GI: Abdomen soft. RUQ/epigastric tenderness. No rigidity or guarding.  No CVA tenderness  MSK: No calf tenderness or swelling.  Neuro: Alert. Follows simple commands.  Skin: Skin is warm and dry. No rash noted.   Psychiatric: Normal affect.       Diagnostics   Lab Results   Labs Ordered and Resulted from Time " of ED Arrival to Time of ED Departure   COMPREHENSIVE METABOLIC PANEL - Abnormal       Result Value    Sodium 138      Potassium 4.1      Carbon Dioxide (CO2) 20 (*)     Anion Gap 13      Urea Nitrogen 12.2      Creatinine 0.53      GFR Estimate >90      Calcium 9.3      Chloride 105      Glucose 153 (*)     Alkaline Phosphatase 203 (*)      (*)      (*)     Protein Total 7.6      Albumin 4.3      Bilirubin Total 0.7     LIPASE - Abnormal    Lipase 2,386 (*)    ROUTINE UA WITH MICROSCOPIC REFLEX TO CULTURE - Abnormal    Color Urine Yellow      Appearance Urine Cloudy (*)     Glucose Urine Negative      Bilirubin Urine Negative      Ketones Urine Negative      Specific Gravity Urine 1.023      Blood Urine Small (*)     pH Urine 7.5 (*)     Protein Albumin Urine 30 (*)     Urobilinogen Urine 4.0 (*)     Nitrite Urine Negative      Leukocyte Esterase Urine Trace (*)     Bacteria Urine Few (*)     Mucus Urine Present (*)     Amorphous Crystals Urine Few (*)     RBC Urine 10 (*)     WBC Urine 4      Squamous Epithelials Urine 10 (*)    CBC WITH PLATELETS AND DIFFERENTIAL - Abnormal    WBC Count 11.5 (*)     RBC Count 4.26      Hemoglobin 11.9      Hematocrit 37.8      MCV 89      MCH 27.9      MCHC 31.5      RDW 14.9      Platelet Count 333      % Neutrophils 83      % Lymphocytes 12      % Monocytes 4      % Eosinophils 0      % Basophils 0      % Immature Granulocytes 0      NRBCs per 100 WBC 0      Absolute Neutrophils 9.5 (*)     Absolute Lymphocytes 1.4      Absolute Monocytes 0.5      Absolute Eosinophils 0.0      Absolute Basophils 0.1      Absolute Immature Granulocytes 0.0      Absolute NRBCs 0.0     HCG QUALITATIVE PREGNANCY - Normal    hCG Serum Qualitative Negative     TROPONIN T, HIGH SENSITIVITY - Normal    Troponin T, High Sensitivity <6     COVID-19 VIRUS (CORONAVIRUS) BY PCR   TRIGLYCERIDES   HEMOGLOBIN A1C     Imaging  CT Abdomen Pelvis w Contrast   Final Result   IMPRESSION:    1.  There  is a 4.8 cm left ovarian cyst and probable left hydrosalpinx.   2.  The uterus is enlarged by multiple fibroids.   3.  Endometrial polyp or fibroid.   4.  No bowel obstruction or inflammation.   5.  10 mm soft tissue nodule in the right breast. Mammography follow-up recommended.      REFERENCE:   Management of Incidental Adnexal Findings on CT and MRI: A White Paper of the ACR Incidental Findings Committee. J Am Dayna Radiol 2020; 17(2):248-254.      Premenopausal or <50 if status unkown:      Equal to or <5 cm: No further imaging.   >5 cm on CT: Ultrasound.   >5 cm on MRI: Ultrasound in 6-12 months if not fully characterized. No follow-up if fully characterized.   >7 cm on MRI: Ultrasound in 6-12 months even if fully characterized.            US Abdomen Limited   Final Result   IMPRESSION:   1.  Gallbladder sludge, cholelithiasis, and adenomyomatosis. No sonographic evidence of acute disease, however.   2.  Hepatic steatosis.              Report per radiology    EKG   ECG results from 01/04/25   EKG 12-lead, tracing only     Value    Systolic Blood Pressure     Diastolic Blood Pressure     Ventricular Rate 79    Atrial Rate 79    NJ Interval 142    QRS Duration 76        QTc 456    P Axis 48    R AXIS 13    T Axis 54    Interpretation ECG      Sinus rhythm  Normal ECG  When compared with ECG of 30-Apr-2023 20:42,  No significant change was found       Independent Interpretation  None  ED Course    Medications Administered  Medications   sodium chloride 0.9 % infusion (has no administration in time range)   senna-docusate (SENOKOT-S/PERICOLACE) 8.6-50 MG per tablet 1 tablet (has no administration in time range)     Or   senna-docusate (SENOKOT-S/PERICOLACE) 8.6-50 MG per tablet 2 tablet (has no administration in time range)   ondansetron (ZOFRAN ODT) ODT tab 4 mg (has no administration in time range)     Or   ondansetron (ZOFRAN) injection 4 mg (has no administration in time range)   prochlorperazine  (COMPAZINE) injection 10 mg (has no administration in time range)     Or   prochlorperazine (COMPAZINE) tablet 10 mg (has no administration in time range)   morphine (PF) injection 1 mg (has no administration in time range)   morphine (PF) injection 2 mg (has no administration in time range)   sodium chloride 0.9 % infusion (has no administration in time range)   ketorolac (TORADOL) injection 15 mg (15 mg Intravenous $Given 1/4/25 0140)   ondansetron (ZOFRAN) injection 4 mg (4 mg Intravenous $Given 1/4/25 0140)   HYDROmorphone (PF) (DILAUDID) injection 0.5 mg (0.5 mg Intravenous $Given 1/4/25 0140)   sodium chloride (PF) 0.9% PF flush 100 mL (59 mLs Intravenous $Given 1/4/25 0258)   iopamidol (ISOVUE-370) solution 500 mL (75 mLs Intravenous $Given 1/4/25 0257)       Procedures  Procedures     Discussion of Management  Admitting Hospitalist, Dr. Wilson     Additional Documentation  None    ED Course  ED Course as of 01/04/25 0406   Sat Jan 04, 2025   0303 I spoke to Dr. Wilson, hospitalist     Medical Decision Making / Diagnosis   CMS Diagnoses: None    MIPS     None    MDM  Patient is a 49-year-old female presenting with predominant complaints of abdominal pain.  She is nontoxic on arrival.  She unfortunately had a prolonged wait time secondary to large patient volumes.  Labs with noted transaminitis as well as elevated lipase.  She initially underwent a right upper quadrant ultrasound which shows sludge, cholelithiasis and hepatic steatosis though no evidence to suggest cholecystitis or CBD dilation.  She further underwent formal CT abdomen which shows incidental left ovarian cyst which she is aware of as well as right breast nodule though no other intra-abdominal catastrophic findings.  I do feel she is to benefit from admission at this point in time.  She required a few doses of IV narcotics.  Possible she had a gallstone trapped in the common bile duct which she has passed.  Her transaminitis may also be  secondary to hepatic steatosis.  She denies any alcohol use.  Patient remained otherwise hemodynamically stable, accepted by hospitalist for admission.    Disposition  The patient was admitted to the hospital.     ICD-10 Codes:    ICD-10-CM    1. Abdominal pain, unspecified abdominal location  R10.9       2. Acute pancreatitis, unspecified complication status, unspecified pancreatitis type  K85.90       3. Transaminitis  R74.01       4. Left ovarian cyst  N83.202       5. Breast nodule  N63.0           Discharge Medications  New Prescriptions    No medications on file     DO Lindsey Smart Lindsey E, DO  01/04/25 0410

## 2025-01-04 NOTE — CONSULTS
[facility]  Gastroenterology Consultation    Piyush Feldman  [address] [address] [place] [address]  49 year old female    Admission Date/Time: 1/4/2025  Primary Care Provider: Sherry Eastman    We were asked to see the patient in consultation by Dr. Portillo for evaluation of pancreatitis.        HPI:  Piyush Feldman is a 49 year old female who has a past medical history of hypertension.    She presents with acute onset of abdominal pain.  Lipase 2386.  , .  Alk phos 203.  Normal total bilirubin.      US with gallbladder sludge, cholelithiasis, and adenomyomatosis.  CT with prominent gallbladder wall.  No calcified gallstones.  Normal pancreas.  4.8 cm left ovarian cyst.  Multiple fibroids in the uterus.      No history of alcohol use.  Hepatic steatosis noted on US.  The only change noted is losartan was started for blood pressure in 11/2024.  She had one previous similar episode which self resolved but otherwise was in her usual state of health prior to onset of symptoms.    ROS: A comprehensive ten point review of systems was negative aside from those in mentioned in the HPI.      MEDICATIONS:   Current Facility-Administered Medications   Medication Dose Route Frequency Provider Last Rate Last Admin    morphine (PF) injection 1 mg  1 mg Intravenous Q2H PRN Diogo Wilson MD        morphine (PF) injection 2 mg  2 mg Intravenous Q2H PRN Diogo Wilson MD   2 mg at 01/04/25 0422    ondansetron (ZOFRAN ODT) ODT tab 4 mg  4 mg Oral Q6H PRN Diogo Wilson MD        Or    ondansetron (ZOFRAN) injection 4 mg  4 mg Intravenous Q6H PRN Diogo Wilson MD        prochlorperazine (COMPAZINE) injection 10 mg  10 mg Intravenous Q6H PRN Diogo Wilson MD        Or    prochlorperazine (COMPAZINE) tablet 10 mg  10 mg Oral Q6H PRN Diogo Wilson MD        senna-docusate (SENOKOT-S/PERICOLACE) 8.6-50 MG per tablet 1 tablet  1 tablet Oral BID PRN  "Diogo Wilson MD        Or    senna-docusate (SENOKOT-S/PERICOLACE) 8.6-50 MG per tablet 2 tablet  2 tablet Oral BID PRN Diogo Wilson MD        sodium chloride 0.9 % infusion   Intravenous Continuous Diogo Wilson  mL/hr at 01/04/25 0426 New Bag at 01/04/25 0426     Current Outpatient Medications   Medication Sig Dispense Refill    Ascorbic Acid (VITAMIN C PO) Take 1 tablet by mouth daily.      cholecalciferol (VITAMIN D3) 25 mcg (1000 units) capsule Take 1 capsule by mouth daily.      ferrous sulfate (FEROSUL) 325 (65 Fe) MG tablet Take 325 mg by mouth daily as needed.      losartan (COZAAR) 50 MG tablet Take 1 tablet (50 mg) by mouth daily. 60 tablet 0    Multiple Vitamin (MULTIVITAMIN ADULT PO)          ALLERGIES: No Known Allergies    Past Medical History:   Diagnosis Date    Hypertension        Past Surgical History:   Procedure Laterality Date    AS REMOVAL OF OVARIAN CYST(S) Left 2016    GYN SURGERY  2016         SOCIAL HISTORY:  Social History     Tobacco Use    Smoking status: Never    Smokeless tobacco: Never   Vaping Use    Vaping status: Never Used   Substance Use Topics    Alcohol use: Never    Drug use: Never       FAMILY HISTORY:  Family History   Problem Relation Age of Onset    Hypertension Mother     Diabetes Father     Hypertension Father     Breast Cancer Sister 46    Breast Cancer Sister     Hypertension Brother     Colon Cancer No family hx of        PHYSICAL EXAM:   BP (!) 145/91   Pulse 86   Temp 98.4  F (36.9  C) (Temporal)   Resp 20   Ht 1.626 m (5' 4\")   Wt 67.7 kg (149 lb 4 oz)   LMP 12/09/2024 (Approximate)   SpO2 100%   BMI 25.62 kg/m      Constitutional: NAD, comfortable  Cardiovascular: RRR, normal S1 and S2, no r/c/g/m  Respiratory: CTAB  Psychiatric: mentation appears normal and affect normal  Head: Normocephalic. Atraumatic.    Neck: Neck supple. No adenopathy. Thyroid symmetric, normal size, trachea midline  Eyes:  PERRL, no icterus  ENT: " Hearing adequate, pharynx normal without erythema or exudate  Abdomen:   Auscultation: + BS  Appearance: non-distended  Palpation: non-tender  NEURO: grossly negative  SKIN: no suspicious lesions or rashes  LYMPH:   anterior cervical: no adenopathy  posterior cervical: no adenopathy  supraclavicular: no adenopathy          ADDITIONAL COMMENTS:   I reviewed the patient's new clinical lab test results.   Recent Labs   Lab Test 01/04/25  0838 01/03/25 2311 11/12/24  1636   WBC 11.8* 11.5* 7.8   HGB 11.1* 11.9 12.5   MCV 89 89 90    333 436     Recent Labs   Lab Test 01/04/25  0838 01/03/25  2311 01/03/25  1328    138 137   POTASSIUM 4.2 4.1 3.7   CHLORIDE 104 105 101   CO2 22 20* 25   BUN 8.6 12.2 11.6   CR 0.49* 0.53 0.55   ANIONGAP 12 13 11   JUAN 9.1 9.3 9.4   * 153* 91     Recent Labs   Lab Test 01/03/25  2331 01/03/25  2311 11/12/24  1642 11/12/24  1636 05/17/24  1549   ALBUMIN  --  4.3  --  4.3 4.5   BILITOTAL  --  0.7  --  0.3 0.5   ALT  --  235*  --  30 16   AST  --  236*  --  23 16   ALKPHOS  --  203*  --  97 84   PROTEIN 30*  --  Negative  --   --    LIPASE  --  2,386*  --   --   --              .    CONSULTATION ASSESSMENT AND PLAN:      50 yo female who presents with acute onset of abdominal pain and is found to have markedly elevated lipase.  No radiographic evidence of pancreatitis or choledocholithiasis.  Elevated transaminases and alk phos with normal bilirubin.    No alcohol use.  Calcium normal.  Triglycerides pending.    -  NPO, IVF.  -  Trend labs.  -  TG pending.  -  Pain, nausea control per primary team.  -  MRCP to further evaluate biliary tract.    Total Time Spent: 39 minutes        I discussed the patient's findings and plan with Dr. Bauman.          Jennie Hanson, [facility]  Office:  [phone] call if needed after 5PM  Cell:  [phone], not available after 5PM at this number

## 2025-01-04 NOTE — PHARMACY-ADMISSION MEDICATION HISTORY
Pharmacy Intern Admission Medication History    Admission medication history is complete. The information provided in this note is only as accurate as the sources available at the time of the update.    Information Source(s): Patient via in-person    Pertinent Information: NA    Changes made to PTA medication list:  Added: None  Deleted: vitamin B-12, ORTHO-CYCLEN  Changed: None    Allergies reviewed with patient and updates made in EHR: yes    Medication History Completed By: Belia York 1/4/2025 9:24 AM    PTA Med List   Medication Sig Last Dose/Taking    Ascorbic Acid (VITAMIN C PO) Take 1 tablet by mouth daily. 1/3/2025 Morning    cholecalciferol (VITAMIN D3) 25 mcg (1000 units) capsule Take 1 capsule by mouth daily. 1/3/2025 Morning    ferrous sulfate (FEROSUL) 325 (65 Fe) MG tablet Take 325 mg by mouth daily as needed. Past Week    losartan (COZAAR) 50 MG tablet Take 1 tablet (50 mg) by mouth daily. 1/3/2025 Evening    Multiple Vitamin (MULTIVITAMIN ADULT PO)  12/30/2024

## 2025-01-05 ENCOUNTER — ANESTHESIA (OUTPATIENT)
Dept: SURGERY | Facility: CLINIC | Age: 50
End: 2025-01-05
Payer: COMMERCIAL

## 2025-01-05 ENCOUNTER — APPOINTMENT (OUTPATIENT)
Dept: GENERAL RADIOLOGY | Facility: CLINIC | Age: 50
End: 2025-01-05
Attending: EMERGENCY MEDICINE
Payer: COMMERCIAL

## 2025-01-05 ENCOUNTER — ANESTHESIA EVENT (OUTPATIENT)
Dept: SURGERY | Facility: CLINIC | Age: 50
End: 2025-01-05
Payer: COMMERCIAL

## 2025-01-05 VITALS
WEIGHT: 149.25 LBS | SYSTOLIC BLOOD PRESSURE: 160 MMHG | HEART RATE: 94 BPM | HEIGHT: 64 IN | DIASTOLIC BLOOD PRESSURE: 96 MMHG | OXYGEN SATURATION: 99 % | TEMPERATURE: 97.8 F | RESPIRATION RATE: 18 BRPM | BODY MASS INDEX: 25.48 KG/M2

## 2025-01-05 LAB
ALBUMIN SERPL BCG-MCNC: 3.5 G/DL (ref 3.5–5.2)
ALP SERPL-CCNC: 197 U/L (ref 40–150)
ALT SERPL W P-5'-P-CCNC: 324 U/L (ref 0–50)
ANION GAP SERPL CALCULATED.3IONS-SCNC: 12 MMOL/L (ref 7–15)
AST SERPL W P-5'-P-CCNC: 164 U/L (ref 0–45)
BILIRUB SERPL-MCNC: 0.9 MG/DL
BUN SERPL-MCNC: 6.9 MG/DL (ref 6–20)
CALCIUM SERPL-MCNC: 8.2 MG/DL (ref 8.8–10.4)
CHLORIDE SERPL-SCNC: 108 MMOL/L (ref 98–107)
CREAT SERPL-MCNC: 0.54 MG/DL (ref 0.51–0.95)
EGFRCR SERPLBLD CKD-EPI 2021: >90 ML/MIN/1.73M2
ERYTHROCYTE [DISTWIDTH] IN BLOOD BY AUTOMATED COUNT: 15 % (ref 10–15)
GLUCOSE SERPL-MCNC: 88 MG/DL (ref 70–99)
HCO3 SERPL-SCNC: 19 MMOL/L (ref 22–29)
HCT VFR BLD AUTO: 33.6 % (ref 35–47)
HGB BLD-MCNC: 10.5 G/DL (ref 11.7–15.7)
LIPASE SERPL-CCNC: 40 U/L (ref 13–60)
MCH RBC QN AUTO: 27.7 PG (ref 26.5–33)
MCHC RBC AUTO-ENTMCNC: 31.3 G/DL (ref 31.5–36.5)
MCV RBC AUTO: 89 FL (ref 78–100)
PLATELET # BLD AUTO: 284 10E3/UL (ref 150–450)
POTASSIUM SERPL-SCNC: 3.6 MMOL/L (ref 3.4–5.3)
PROT SERPL-MCNC: 6.3 G/DL (ref 6.4–8.3)
RBC # BLD AUTO: 3.79 10E6/UL (ref 3.8–5.2)
SODIUM SERPL-SCNC: 139 MMOL/L (ref 135–145)
WBC # BLD AUTO: 6.8 10E3/UL (ref 4–11)

## 2025-01-05 PROCEDURE — 85014 HEMATOCRIT: CPT | Performed by: INTERNAL MEDICINE

## 2025-01-05 PROCEDURE — 250N000011 HC RX IP 250 OP 636: Performed by: ANESTHESIOLOGY

## 2025-01-05 PROCEDURE — 258N000001 HC RX 258: Performed by: SURGERY

## 2025-01-05 PROCEDURE — 88304 TISSUE EXAM BY PATHOLOGIST: CPT | Mod: 26

## 2025-01-05 PROCEDURE — 250N000009 HC RX 250: Performed by: NURSE ANESTHETIST, CERTIFIED REGISTERED

## 2025-01-05 PROCEDURE — 47563 LAPARO CHOLECYSTECTOMY/GRAPH: CPT | Performed by: SURGERY

## 2025-01-05 PROCEDURE — 250N000013 HC RX MED GY IP 250 OP 250 PS 637: Performed by: NURSE PRACTITIONER

## 2025-01-05 PROCEDURE — 255N000002 HC RX 255 OP 636: Performed by: SURGERY

## 2025-01-05 PROCEDURE — G0378 HOSPITAL OBSERVATION PER HR: HCPCS

## 2025-01-05 PROCEDURE — 250N000009 HC RX 250: Performed by: SURGERY

## 2025-01-05 PROCEDURE — 710N000010 HC RECOVERY PHASE 1, LEVEL 2, PER MIN: Performed by: SURGERY

## 2025-01-05 PROCEDURE — 83690 ASSAY OF LIPASE: CPT | Performed by: INTERNAL MEDICINE

## 2025-01-05 PROCEDURE — 250N000011 HC RX IP 250 OP 636: Performed by: NURSE ANESTHETIST, CERTIFIED REGISTERED

## 2025-01-05 PROCEDURE — 258N000003 HC RX IP 258 OP 636: Performed by: ANESTHESIOLOGY

## 2025-01-05 PROCEDURE — 370N000017 HC ANESTHESIA TECHNICAL FEE, PER MIN: Performed by: SURGERY

## 2025-01-05 PROCEDURE — 250N000011 HC RX IP 250 OP 636: Performed by: INTERNAL MEDICINE

## 2025-01-05 PROCEDURE — 99239 HOSP IP/OBS DSCHRG MGMT >30: CPT | Performed by: STUDENT IN AN ORGANIZED HEALTH CARE EDUCATION/TRAINING PROGRAM

## 2025-01-05 PROCEDURE — 360N000083 HC SURGERY LEVEL 3 W/ FLUORO, PER MIN: Performed by: SURGERY

## 2025-01-05 PROCEDURE — 250N000011 HC RX IP 250 OP 636: Performed by: EMERGENCY MEDICINE

## 2025-01-05 PROCEDURE — 250N000013 HC RX MED GY IP 250 OP 250 PS 637: Performed by: INTERNAL MEDICINE

## 2025-01-05 PROCEDURE — 258N000003 HC RX IP 258 OP 636: Performed by: NURSE ANESTHETIST, CERTIFIED REGISTERED

## 2025-01-05 PROCEDURE — 96376 TX/PRO/DX INJ SAME DRUG ADON: CPT

## 2025-01-05 PROCEDURE — 80053 COMPREHEN METABOLIC PANEL: CPT | Performed by: INTERNAL MEDICINE

## 2025-01-05 PROCEDURE — 36415 COLL VENOUS BLD VENIPUNCTURE: CPT | Performed by: INTERNAL MEDICINE

## 2025-01-05 PROCEDURE — 88304 TISSUE EXAM BY PATHOLOGIST: CPT | Mod: TC | Performed by: SURGERY

## 2025-01-05 PROCEDURE — 250N000013 HC RX MED GY IP 250 OP 250 PS 637: Performed by: ANESTHESIOLOGY

## 2025-01-05 PROCEDURE — 999N000179 XR SURGERY CARM FLUORO LESS THAN 5 MIN W STILLS

## 2025-01-05 PROCEDURE — 272N000001 HC OR GENERAL SUPPLY STERILE: Performed by: SURGERY

## 2025-01-05 PROCEDURE — 999N000141 HC STATISTIC PRE-PROCEDURE NURSING ASSESSMENT: Performed by: SURGERY

## 2025-01-05 PROCEDURE — 250N000025 HC SEVOFLURANE, PER MIN: Performed by: SURGERY

## 2025-01-05 RX ORDER — ONDANSETRON 4 MG/1
4 TABLET, ORALLY DISINTEGRATING ORAL EVERY 8 HOURS PRN
Qty: 10 TABLET | Refills: 0 | Status: SHIPPED | OUTPATIENT
Start: 2025-01-05

## 2025-01-05 RX ORDER — OXYCODONE HYDROCHLORIDE 5 MG/1
5 TABLET ORAL EVERY 4 HOURS PRN
Status: DISCONTINUED | OUTPATIENT
Start: 2025-01-05 | End: 2025-01-05 | Stop reason: HOSPADM

## 2025-01-05 RX ORDER — OXYCODONE HYDROCHLORIDE 5 MG/1
10 TABLET ORAL EVERY 4 HOURS PRN
Status: DISCONTINUED | OUTPATIENT
Start: 2025-01-05 | End: 2025-01-05 | Stop reason: HOSPADM

## 2025-01-05 RX ORDER — ALBUTEROL SULFATE 0.83 MG/ML
2.5 SOLUTION RESPIRATORY (INHALATION) EVERY 4 HOURS PRN
Status: DISCONTINUED | OUTPATIENT
Start: 2025-01-05 | End: 2025-01-05 | Stop reason: HOSPADM

## 2025-01-05 RX ORDER — ONDANSETRON 2 MG/ML
4 INJECTION INTRAMUSCULAR; INTRAVENOUS EVERY 30 MIN PRN
Status: DISCONTINUED | OUTPATIENT
Start: 2025-01-05 | End: 2025-01-05 | Stop reason: HOSPADM

## 2025-01-05 RX ORDER — LABETALOL HYDROCHLORIDE 5 MG/ML
10 INJECTION, SOLUTION INTRAVENOUS EVERY 10 MIN PRN
Status: DISCONTINUED | OUTPATIENT
Start: 2025-01-05 | End: 2025-01-05 | Stop reason: HOSPADM

## 2025-01-05 RX ORDER — DEXAMETHASONE SODIUM PHOSPHATE 4 MG/ML
4 INJECTION, SOLUTION INTRA-ARTICULAR; INTRALESIONAL; INTRAMUSCULAR; INTRAVENOUS; SOFT TISSUE
Status: COMPLETED | OUTPATIENT
Start: 2025-01-05 | End: 2025-01-05

## 2025-01-05 RX ORDER — ONDANSETRON 4 MG/1
4 TABLET, ORALLY DISINTEGRATING ORAL EVERY 30 MIN PRN
Status: DISCONTINUED | OUTPATIENT
Start: 2025-01-05 | End: 2025-01-05 | Stop reason: HOSPADM

## 2025-01-05 RX ORDER — PROPOFOL 10 MG/ML
INJECTION, EMULSION INTRAVENOUS PRN
Status: DISCONTINUED | OUTPATIENT
Start: 2025-01-05 | End: 2025-01-05

## 2025-01-05 RX ORDER — SODIUM CHLORIDE, SODIUM LACTATE, POTASSIUM CHLORIDE, CALCIUM CHLORIDE 600; 310; 30; 20 MG/100ML; MG/100ML; MG/100ML; MG/100ML
INJECTION, SOLUTION INTRAVENOUS CONTINUOUS PRN
Status: DISCONTINUED | OUTPATIENT
Start: 2025-01-05 | End: 2025-01-05

## 2025-01-05 RX ORDER — DEXAMETHASONE SODIUM PHOSPHATE 4 MG/ML
4 INJECTION, SOLUTION INTRA-ARTICULAR; INTRALESIONAL; INTRAMUSCULAR; INTRAVENOUS; SOFT TISSUE
Status: DISCONTINUED | OUTPATIENT
Start: 2025-01-05 | End: 2025-01-05 | Stop reason: HOSPADM

## 2025-01-05 RX ORDER — OXYCODONE HYDROCHLORIDE 5 MG/1
5-10 TABLET ORAL EVERY 4 HOURS PRN
Qty: 20 TABLET | Refills: 0 | Status: SHIPPED | OUTPATIENT
Start: 2025-01-05

## 2025-01-05 RX ORDER — MEPERIDINE HYDROCHLORIDE 25 MG/ML
12.5 INJECTION INTRAMUSCULAR; INTRAVENOUS; SUBCUTANEOUS EVERY 5 MIN PRN
Status: DISCONTINUED | OUTPATIENT
Start: 2025-01-05 | End: 2025-01-05 | Stop reason: HOSPADM

## 2025-01-05 RX ORDER — BUPIVACAINE HYDROCHLORIDE AND EPINEPHRINE 5; 5 MG/ML; UG/ML
INJECTION, SOLUTION PERINEURAL PRN
Status: DISCONTINUED | OUTPATIENT
Start: 2025-01-05 | End: 2025-01-05 | Stop reason: HOSPADM

## 2025-01-05 RX ORDER — FENTANYL CITRATE 50 UG/ML
25 INJECTION, SOLUTION INTRAMUSCULAR; INTRAVENOUS EVERY 5 MIN PRN
Status: DISCONTINUED | OUTPATIENT
Start: 2025-01-05 | End: 2025-01-05 | Stop reason: HOSPADM

## 2025-01-05 RX ORDER — ONDANSETRON 2 MG/ML
INJECTION INTRAMUSCULAR; INTRAVENOUS PRN
Status: DISCONTINUED | OUTPATIENT
Start: 2025-01-05 | End: 2025-01-05

## 2025-01-05 RX ORDER — NALOXONE HYDROCHLORIDE 0.4 MG/ML
0.1 INJECTION, SOLUTION INTRAMUSCULAR; INTRAVENOUS; SUBCUTANEOUS
Status: DISCONTINUED | OUTPATIENT
Start: 2025-01-05 | End: 2025-01-05 | Stop reason: HOSPADM

## 2025-01-05 RX ORDER — ACETAMINOPHEN 325 MG/1
975 TABLET ORAL ONCE
Status: DISCONTINUED | OUTPATIENT
Start: 2025-01-05 | End: 2025-01-05 | Stop reason: HOSPADM

## 2025-01-05 RX ORDER — LIDOCAINE HYDROCHLORIDE 20 MG/ML
INJECTION, SOLUTION INFILTRATION; PERINEURAL PRN
Status: DISCONTINUED | OUTPATIENT
Start: 2025-01-05 | End: 2025-01-05

## 2025-01-05 RX ORDER — CEFAZOLIN SODIUM 2 G/100ML
2 INJECTION, SOLUTION INTRAVENOUS
Status: DISCONTINUED | OUTPATIENT
Start: 2025-01-05 | End: 2025-01-05

## 2025-01-05 RX ORDER — CEFAZOLIN SODIUM 2 G/100ML
2 INJECTION, SOLUTION INTRAVENOUS SEE ADMIN INSTRUCTIONS
Status: DISCONTINUED | OUTPATIENT
Start: 2025-01-05 | End: 2025-01-05 | Stop reason: HOSPADM

## 2025-01-05 RX ORDER — SODIUM CHLORIDE, SODIUM LACTATE, POTASSIUM CHLORIDE, CALCIUM CHLORIDE 600; 310; 30; 20 MG/100ML; MG/100ML; MG/100ML; MG/100ML
INJECTION, SOLUTION INTRAVENOUS CONTINUOUS
Status: ACTIVE | OUTPATIENT
Start: 2025-01-05 | End: 2025-01-05

## 2025-01-05 RX ORDER — INDOCYANINE GREEN AND WATER 25 MG
2.5 KIT INJECTION ONCE
Status: COMPLETED | OUTPATIENT
Start: 2025-01-05 | End: 2025-01-05

## 2025-01-05 RX ORDER — KETOROLAC TROMETHAMINE 30 MG/ML
30 INJECTION, SOLUTION INTRAMUSCULAR; INTRAVENOUS ONCE
Status: COMPLETED | OUTPATIENT
Start: 2025-01-05 | End: 2025-01-05

## 2025-01-05 RX ORDER — FENTANYL CITRATE 50 UG/ML
25 INJECTION, SOLUTION INTRAMUSCULAR; INTRAVENOUS
Status: DISCONTINUED | OUTPATIENT
Start: 2025-01-05 | End: 2025-01-05 | Stop reason: HOSPADM

## 2025-01-05 RX ORDER — ACETAMINOPHEN 325 MG/1
975 TABLET ORAL ONCE
Status: COMPLETED | OUTPATIENT
Start: 2025-01-05 | End: 2025-01-05

## 2025-01-05 RX ORDER — CEFAZOLIN SODIUM/WATER 2 G/20 ML
2 SYRINGE (ML) INTRAVENOUS ONCE
Status: COMPLETED | OUTPATIENT
Start: 2025-01-05 | End: 2025-01-05

## 2025-01-05 RX ORDER — HYDROMORPHONE HCL IN WATER/PF 6 MG/30 ML
0.2 PATIENT CONTROLLED ANALGESIA SYRINGE INTRAVENOUS EVERY 5 MIN PRN
Status: DISCONTINUED | OUTPATIENT
Start: 2025-01-05 | End: 2025-01-05 | Stop reason: HOSPADM

## 2025-01-05 RX ORDER — GLYCOPYRROLATE 0.2 MG/ML
INJECTION, SOLUTION INTRAMUSCULAR; INTRAVENOUS PRN
Status: DISCONTINUED | OUTPATIENT
Start: 2025-01-05 | End: 2025-01-05

## 2025-01-05 RX ADMIN — LIDOCAINE HYDROCHLORIDE 50 MG: 20 INJECTION, SOLUTION INFILTRATION; PERINEURAL at 12:06

## 2025-01-05 RX ADMIN — MIDAZOLAM 2 MG: 1 INJECTION INTRAMUSCULAR; INTRAVENOUS at 12:03

## 2025-01-05 RX ADMIN — MORPHINE SULFATE 1 MG: 2 INJECTION, SOLUTION INTRAMUSCULAR; INTRAVENOUS at 14:58

## 2025-01-05 RX ADMIN — KETOROLAC TROMETHAMINE 30 MG: 30 INJECTION, SOLUTION INTRAMUSCULAR at 13:33

## 2025-01-05 RX ADMIN — FENTANYL CITRATE 25 MCG: 50 INJECTION INTRAMUSCULAR; INTRAVENOUS at 13:40

## 2025-01-05 RX ADMIN — SUGAMMADEX 200 MG: 100 INJECTION, SOLUTION INTRAVENOUS at 12:52

## 2025-01-05 RX ADMIN — ONDANSETRON 4 MG: 2 INJECTION INTRAMUSCULAR; INTRAVENOUS at 12:34

## 2025-01-05 RX ADMIN — FENTANYL CITRATE 50 MCG: 50 INJECTION INTRAMUSCULAR; INTRAVENOUS at 12:06

## 2025-01-05 RX ADMIN — SODIUM CHLORIDE, POTASSIUM CHLORIDE, SODIUM LACTATE AND CALCIUM CHLORIDE: 600; 310; 30; 20 INJECTION, SOLUTION INTRAVENOUS at 11:56

## 2025-01-05 RX ADMIN — PROPOFOL 200 MG: 10 INJECTION, EMULSION INTRAVENOUS at 12:06

## 2025-01-05 RX ADMIN — ACETAMINOPHEN 975 MG: 325 TABLET, FILM COATED ORAL at 14:12

## 2025-01-05 RX ADMIN — HYDROMORPHONE HYDROCHLORIDE 1 MG: 1 INJECTION, SOLUTION INTRAMUSCULAR; INTRAVENOUS; SUBCUTANEOUS at 12:20

## 2025-01-05 RX ADMIN — Medication 1 LOZENGE: at 16:08

## 2025-01-05 RX ADMIN — DEXAMETHASONE SODIUM PHOSPHATE 8 MG: 4 INJECTION, SOLUTION INTRA-ARTICULAR; INTRALESIONAL; INTRAMUSCULAR; INTRAVENOUS; SOFT TISSUE at 12:07

## 2025-01-05 RX ADMIN — FENTANYL CITRATE 50 MCG: 50 INJECTION INTRAMUSCULAR; INTRAVENOUS at 12:14

## 2025-01-05 RX ADMIN — GLYCOPYRROLATE 0.2 MG: 0.2 INJECTION, SOLUTION INTRAMUSCULAR; INTRAVENOUS at 12:24

## 2025-01-05 RX ADMIN — ROCURONIUM BROMIDE 50 MG: 50 INJECTION, SOLUTION INTRAVENOUS at 12:07

## 2025-01-05 RX ADMIN — INDOCYANINE GREEN AND WATER 2.5 MG: KIT at 11:05

## 2025-01-05 RX ADMIN — SODIUM CHLORIDE, POTASSIUM CHLORIDE, SODIUM LACTATE AND CALCIUM CHLORIDE: 600; 310; 30; 20 INJECTION, SOLUTION INTRAVENOUS at 11:09

## 2025-01-05 RX ADMIN — Medication 2 G: at 12:03

## 2025-01-05 RX ADMIN — OXYCODONE HYDROCHLORIDE 5 MG: 5 TABLET ORAL at 17:10

## 2025-01-05 RX ADMIN — ONDANSETRON 4 MG: 4 TABLET, ORALLY DISINTEGRATING ORAL at 16:07

## 2025-01-05 RX ADMIN — FENTANYL CITRATE 25 MCG: 50 INJECTION INTRAMUSCULAR; INTRAVENOUS at 13:34

## 2025-01-05 ASSESSMENT — ACTIVITIES OF DAILY LIVING (ADL)
ADLS_ACUITY_SCORE: 44

## 2025-01-05 NOTE — PROGRESS NOTES
The patient is feeling significantly better today.  Lipase has come down to normal.  She is interested in trying to have her gallbladder removed today if possible.    We discussed the procedure, along with its risks and complications, in detail.  The patient has agreed to proceed.  We will bring her to the operating room for laparoscopic cholecystectomy with intraoperative cholangiogram.    Yury Oswald MD  [facility]

## 2025-01-05 NOTE — OP NOTE
General Surgery Operative Note    Pre-operative diagnosis:  Acute gallstone pancreatitis [K85.10]   Post-operative diagnosis: same   Procedure: Laparoscopic Cholecystectomy with cholangiogram   Surgeon: Yury Oswald MD   Assistant(s): Jj Hussein PA-C - the physician assistant was medically necessary to assist in prepping, positioning, camera operation, retraction/exposure and closure of the port site.    Anesthesia: General    Estimated blood loss: 5 cc's   Drains placed: None   Complications:  None   Findings:  Gallbladder with minimal inflammation.  Intraoperative cholangiogram revealed no evidence of common duct stones.     INDICATIONS FOR OPERATION: This is a patient with upper abdominal pain, gallstones, and pancreatitis on admission.  She had significant improvement in her lipase over 24 hours and MRCP did not show any evidence of a common duct stone.  Laparoscopic cholecystectomy with cholangiogram was recommended.  The procedure along with its risks and complications was discussed in detail and the patient agreed to proceed.    DETAILS OF THE OPERATION: After informed consent the patient was taken to the operating room where she underwent satisfactory induction of general anesthesia.  The patient was then sterilely prepped and draped.  A supraumbilical skin incision was made using a skin knife.  The dissection was carried bluntly down to the fascia.  The fascia was opened using electrocautery and the Conley trocar was then inserted.  Pneumoperitoneum was achieved using CO2 insufflation, and under direct visualization  three  5 mm upper abdominal ports were placed.  The gallbladder was visualized and was grasped. It was pulled up over the liver and the cystic duct was exposed.  The cystic duct was then skeletonized, and a single clip was placed on the gallbladder end.  A cholangiogram catheter was inserted into the abdomen through 12-gauge Angiocath, and was then threaded into the cystic duct and  clipped in place.  C-arm cholangiogram was performed.  There was no evidence of common duct stones.  The cholangiogram catheter was now removed and the cystic duct was double clipped on the common duct end and divided.  The cystic artery was now skeletonized, triple clipped and divided.  The posterior branch was also clipped.The gallbladder was then dissected away from the liver using electrocautery.  The gallbladder was then placed in an Endo Catch bag and removed through the supraumbilical incision.  The gallbladder fossa was irrigated out.  There was excellent hemostasis and the clips were in good position.  The trocar sites were now infiltrated with half percent Marcaine with epinephrine.  The trochars were removed under direct visualization.  The supraumbilical fascia was then closed using 0 Vicryl suture.  Skin incisions were closed using 4-0 subcuticular Vicryl followed by Steri-Strips.    The patient was transferred to the recovery room in satisfactory condition.  Sponge and needle counts were correct at the close of the case.     Specimens:   ID Type Source Tests Collected by Time Destination   1 : Gallbladder and contents Calculus/Stone Gallbladder SURGICAL PATHOLOGY EXAM Yury Oswald MD 1/5/2025 12:40 PM            Yury Oswald MD

## 2025-01-05 NOTE — PLAN OF CARE
"PRIMARY DIAGNOSIS: Abd pain/ Possible laparoscopic cholecystectomy today  OUTPATIENT/OBSERVATION GOALS TO BE MET BEFORE DISCHARGE:  1. Pain Status: Improved-controlled with oral pain medications.     2. Return to near baseline physical activity: Yes     3. Cleared for discharge by consultants (if involved): No        Discharge Planner Nurse  Safe discharge environment identified: Yes  Barriers to discharge: Yes          /79 (BP Location: Right arm)   Pulse 78   Temp 98.6  F (37  C) (Oral)   Resp 16   Ht 1.626 m (5' 4\")   Wt 67.7 kg (149 lb 4 oz)   LMP 12/09/2024 (Approximate)   SpO2 98%   BMI 25.62 kg/m     Patient is Alert and Oriented x4. They are independent with no assistive devices .  Pt is a Clear liquid diet- NPO at midnight for possible laparoscopic cholecystectomy tomorrow. Surgery/GI following. Gallbladder US shows gallstones and sludge. MRCP no evidence of ductal dilatation. They are denying pain.  Patient has Normal Saline 0.9% running at 100 mL per hour. Recheck am labs        Please review provider order for any additional goals.   Nurse to notify provider when observation goals have been met and patient is ready for discharge.  Goal Outcome Evaluation:       Plan of Care Reviewed With: patient     Overall Patient Progress: improvingOverall Patient Progress: improving     Outcome Evaluation: A&Ox4, VVS, NS 100ml/hr. Surgery/GI following- Plan for laproscopic cholecystectomy- NPO midnight. redraw am labs. Lipase elevated  "

## 2025-01-05 NOTE — CONSULTS
See consult note     Detail Level: Detailed Samples Given: B5 serum\\n\\nDouble repair moisturizer with & without spf Render In Strict Bullet Format?: No Initiate Treatment: clindamycin phosphate 1 % topical solution \\nQuantity: 60.0 ml  Days Supply: 30\\nSig: Apply to the face every morning\\n\\ntretinoin 0.025 % topical cream \\nQuantity: 45.0 g  Days Supply: 30\\nSig: Apply a pea size to face & 2 pea sizes to the back  twice a week then every other night then once tolerated apply nightly Plan: Patient to apply b5 serum with Tretinoin at night with a moisturizer

## 2025-01-05 NOTE — ANESTHESIA PREPROCEDURE EVALUATION
Anesthesia Pre-Procedure Evaluation    Patient: Piyush Feldman   MRN: [id number] : 1975        Procedure : Procedure(s):  CHOLECYSTECTOMY, LAPAROSCOPIC, WITH CHOLANGIOGRAM          Past Medical History:   Diagnosis Date    Hypertension       Past Surgical History:   Procedure Laterality Date    AS REMOVAL OF OVARIAN CYST(S) Left 2016    GYN SURGERY  2016      No Known Allergies   Social History     Tobacco Use    Smoking status: Never    Smokeless tobacco: Never   Substance Use Topics    Alcohol use: Never      Wt Readings from Last 1 Encounters:   25 67.7 kg (149 lb 4 oz)        Anesthesia Evaluation   Pt has had prior anesthetic. Type: General.    No history of anesthetic complications       ROS/MED HX  ENT/Pulmonary:  - neg pulmonary ROS     Neurologic:  - neg neurologic ROS     Cardiovascular:     (+)  hypertension- -   -  - -                                      METS/Exercise Tolerance:     Hematologic:  - neg hematologic  ROS     Musculoskeletal:  - neg musculoskeletal ROS     GI/Hepatic:     (+)          cholecystitis/cholelithiasis,          Renal/Genitourinary:  - neg Renal ROS     Endo:  - neg endo ROS     Psychiatric/Substance Use:  - neg psychiatric ROS     Infectious Disease:  - neg infectious disease ROS     Malignancy:  - neg malignancy ROS     Other:  - neg other ROS          Physical Exam    Airway        Mallampati: III   TM distance: > 3 FB   Neck ROM: full   Mouth opening: > 3 cm    Respiratory Devices and Support         Dental  no notable dental history         Cardiovascular   cardiovascular exam normal          Pulmonary   pulmonary exam normal                OUTSIDE LABS:  CBC:   Lab Results   Component Value Date    WBC 6.8 2025    WBC 11.8 (H) 2025    HGB 10.5 (L) 2025    HGB 11.1 (L) 2025    HCT 33.6 (L) 2025    HCT 35.3 2025     2025     2025     BMP:   Lab Results   Component Value Date     2025     " 01/04/2025    POTASSIUM 3.6 01/05/2025    POTASSIUM 4.2 01/04/2025    CHLORIDE 108 (H) 01/05/2025    CHLORIDE 104 01/04/2025    CO2 19 (L) 01/05/2025    CO2 22 01/04/2025    BUN 6.9 01/05/2025    BUN 8.6 01/04/2025    CR 0.54 01/05/2025    CR 0.49 (L) 01/04/2025    GLC 88 01/05/2025     (H) 01/04/2025     COAGS: No results found for: \"PTT\", \"INR\", \"FIBR\"  POC:   Lab Results   Component Value Date    HCGS Negative 01/03/2025     HEPATIC:   Lab Results   Component Value Date    ALBUMIN 3.5 01/05/2025    PROTTOTAL 6.3 (L) 01/05/2025     (H) 01/05/2025     (H) 01/05/2025    ALKPHOS 197 (H) 01/05/2025    BILITOTAL 0.9 01/05/2025     OTHER:   Lab Results   Component Value Date    A1C 5.7 (H) 01/03/2025    JUAN 8.2 (L) 01/05/2025    LIPASE 40 01/05/2025    TSH 2.32 11/12/2024       Anesthesia Plan    ASA Status:  2    NPO Status:  NPO Appropriate    Anesthesia Type: General.     - Airway: ETT   Induction: Intravenous, Propofol.   Maintenance: Balanced.        Consents    Anesthesia Plan(s) and associated risks, benefits, and realistic alternatives discussed. Questions answered and patient/representative(s) expressed understanding.     - Discussed:     - Discussed with:  Patient            Postoperative Care    Pain management: IV analgesics, Oral pain medications, Multi-modal analgesia.   PONV prophylaxis: Ondansetron (or other 5HT-3), Dexamethasone or Solumedrol     Comments:               Greg Elizabeth MD    I have reviewed the pertinent notes and labs in the chart from the past 30 days and (re)examined the patient.  Any updates or changes from those notes are reflected in this note.      # Hyperchloremia: Highest Cl = 108 mmol/L in last 2 days, will monitor as appropriate      # Hypocalcemia: Lowest Ca = 8.2 mg/dL in last 2 days, will monitor and replace as appropriate         # Hypertension: Noted on problem list      # Anemia: based on hgb <11       # Overweight: Estimated body " "mass index is 25.62 kg/m  as calculated from the following:    Height as of this encounter: 1.626 m (5' 4\").    Weight as of this encounter: 67.7 kg (149 lb 4 oz).             "

## 2025-01-05 NOTE — ANESTHESIA PROCEDURE NOTES
Airway       Patient location during procedure: OR       Procedure Start/Stop Times: 1/5/2025 12:09 PM  Staff -        CRNA: Augustin Mari APRN CRNA       Performed By: CRNA  Consent for Airway        Urgency: elective  Indications and Patient Condition       Indications for airway management: bret-procedural       Induction type:intravenous       Mask difficulty assessment: 1 - vent by mask    Final Airway Details       Final airway type: endotracheal airway       Successful airway: ETT - single  Endotracheal Airway Details        ETT size (mm): 7.0       Cuffed: yes       Successful intubation technique: direct laryngoscopy       DL Blade Type: García 2       Grade View of Cords: 1       Adjucts: stylet       Position: Right       Measured from: lips       Secured at (cm): 22    Post intubation assessment        Placement verified by: capnometry, equal breath sounds and chest rise        Number of attempts at approach: 1       Number of other approaches attempted: 0       Secured with: tape       Ease of procedure: easy       Dentition: Unchanged and Intact    Medication(s) Administered   Medication Administration Time: 1/5/2025 12:09 PM

## 2025-01-05 NOTE — PLAN OF CARE
PRIMARY DIAGNOSIS: Abd pain/ Possible laparoscopic cholecystectomy tomorrow  OUTPATIENT/OBSERVATION GOALS TO BE MET BEFORE DISCHARGE:  1. Pain Status: Improved-controlled with oral pain medications.    2. Return to near baseline physical activity: Yes    3. Cleared for discharge by consultants (if involved): No    Discharge Planner Nurse   Safe discharge environment identified: Yes  Barriers to discharge: Yes         Vitals are Temp: 98.1  F (36.7  C) Temp src: Oral BP: (!) 151/91 Pulse: 80   Resp: 18 SpO2: 99 %.  Patient is Alert and Oriented x4. They are independent with no assistive devices .  Pt is a Clear liquid diet- NPO at midnight for possible laparoscopic cholecystectomy tomorrow. Surgery/GI following. Gallbladder US shows gallstones and sludge. MRCP no evidence of ductal dilatation. They are denying pain.  Patient has Normal Saline 0.9% running at 100 mL per hour. Recheck am labs     Please review provider order for any additional goals.   Nurse to notify provider when observation goals have been met and patient is ready for discharge.Goal Outcome Evaluation:      Plan of Care Reviewed With: patient    Overall Patient Progress: improvingOverall Patient Progress: improving    Outcome Evaluation: A&Ox4, VVS, NS 100ml/hr. Surgery/GI following- Plan for laproscopic cholecystectomy- NPO midnight. redraw am labs. Lipase elevated

## 2025-01-05 NOTE — PLAN OF CARE
Goal Outcome Evaluation:    Plan of Care Reviewed With: patient    Overall Patient Progress: improving    Outcome Evaluation: Remains in OR/Surgery Area.

## 2025-01-05 NOTE — PLAN OF CARE
"PRIMARY DIAGNOSIS: Abd pain/ Possible laparoscopic cholecystectomy tomorrow  OUTPATIENT/OBSERVATION GOALS TO BE MET BEFORE DISCHARGE:  1. Pain Status: Improved-controlled with oral pain medications.     2. Return to near baseline physical activity: Yes     3. Cleared for discharge by consultants (if involved): No        Discharge Planner Nurse  Safe discharge environment identified: Yes  Barriers to discharge: Yes          BP (!) 146/88 (BP Location: Right arm)   Pulse 87   Temp 98.7  F (37.1  C) (Oral)   Resp 16   Ht 1.626 m (5' 4\")   Wt 67.7 kg (149 lb 4 oz)   LMP 12/09/2024 (Approximate)   SpO2 99%   BMI 25.62 kg/m   .  Patient is Alert and Oriented x4. They are independent with no assistive devices .  Pt is a Clear liquid diet- NPO at midnight for possible laparoscopic cholecystectomy tomorrow. Surgery/GI following. Gallbladder US shows gallstones and sludge. MRCP no evidence of ductal dilatation. They are denying pain.  Patient has Normal Saline 0.9% running at 100 mL per hour. Recheck am labs        Please review provider order for any additional goals.   Nurse to notify provider when observation goals have been met and patient is ready for discharge.Goal Outcome Evaluation:       Plan of Care Reviewed With: patient     Overall Patient Progress: improvingOverall Patient Progress: improving     Outcome Evaluation: A&Ox4, VVS, NS 100ml/hr. Surgery/GI following- Plan for laproscopic cholecystectomy- NPO midnight. redraw am labs. Lipase elevated  "

## 2025-01-05 NOTE — PLAN OF CARE
PRIMARY DIAGNOSIS: ACUTE CHOLECYSTITIS  OUTPATIENT/OBSERVATION GOALS TO BE MET BEFORE DISCHARGE:    1. Pain status: Pain free.  2. Stable vital signs and labs (if performed) at disposition: Yes  3. Tolerating adequate PO diet: No - NPO  4. Successful cholecystectomy plan: Yes  5. ADLs back to baseline?  Yes  6. Activity and level of assistance: Up with standby assistance.  7. Barriers to discharge noted Yes - lap viktoria    Discharge Planner Nurse   Safe discharge environment identified: Yes  Barriers to discharge: Yes - lap viktoria       Entered by: Josefina Richardson RN 01/05/2025        /88  Pulse 78  Temp 98.4  F  Resp 16  SpO2 99%       Denies pain and nausea. LBM this AM per patient. Up ind, steady gait observed. Patient states she in interested in pursuing lap viktoria, Dr. Oswald notified and patient placed on schedule at 11:55. Report called to pre-op RN. Patient completed jonathan bath and put on clean gown. Patient updated  on plan of care. GI following.    Please review provider order for any additional goals.   Nurse to notify provider when observation goals have been met and patient is ready for discharge.    Goal Outcome Evaluation:    Plan of Care Reviewed With: patient    Overall Patient Progress: improving    Outcome Evaluation: Plan for lap viktoria this afternoon; patient updated on timing.

## 2025-01-05 NOTE — DISCHARGE SUMMARY
"[facility]  Hospitalist Discharge Summary      Date of Admission:  1/4/2025  Date of Discharge:  1/5/2025  Discharging Provider: Bairon Maxwell MD  Discharge Service: Hospitalist Service    Discharge Diagnoses   Acute, Gallstone Pancreatitis  Prediabetes  Hypertension  Ovasian Cyst / Fibroids  Right Breast Nodule    Clinically Significant Risk Factors     # Overweight: Estimated body mass index is 25.62 kg/m  as calculated from the following:    Height as of this encounter: 1.626 m (5' 4\").    Weight as of this encounter: 67.7 kg (149 lb 4 oz).       Follow-ups Needed After Discharge   Follow up with PCP    Unresulted Labs Ordered in the Past 30 Days of this Admission       Date and Time Order Name Status Description    1/5/2025 12:44 PM Surgical Pathology Exam In process           Discharge Disposition   Discharged to home  Condition at discharge: Stable    Hospital Course   Piyush Feldman is a 49 year old female with past medical history significant for hypertension who presented to [facility] on 1/4/2025 with acute abdominal pain and was found to have pancreatitis. LFTs with , , alkaline phosphatase 203. Lipase was elevated to 2386. RUQ US was obtained showing gallbladder sludge with multiple calculi and evidence of adenomyomatosis of gallbladder wall. CT abdomen / pelvis with gallbladder wall upper limits of normal and normal appearance of pancreas. MRCP was obtained showing gallbladder sludge without distention or biliary ductal dilation. Surgery was consulted recommending cholecystectomy. Prior to surgery, patient's pain had resolved with normalization of lipase. Thought patient likely had gallstone pancreatitis with interval clearance of obstructing stone.     Patient was taken to OR for laparoscopic cholecystectomy on 1/5/2025. Intraoperative cholangiogram was performed without evidence of CBD stones. Patient is being discharged home in stable condition. " "    Incidentally, patient was noted to have 4.8cm left ovarian cyste and probably left hydrosalpinx. Also noted to have multiple uterine fibroids. There was evidence of a 100mm soft tissue nodule in the right breast. Follow up with mammography recommended.    Consultations This Hospital Stay   GASTROENTEROLOGY IP CONSULT  SURGERY GENERAL IP CONSULT    Code Status   Full Code    Time Spent on this Encounter   I, Bairon Maxwell MD, personally saw the patient today and spent greater than 30 minutes discharging this patient.       Bairon Maxwell MD  [facility]  [address] [place] [address]  Phone: [phone]  Fax: [phone]  ______________________________________________________________________    Physical Exam   Temp: 97.7  F (36.5  C) Temp src: Temporal BP: (!) 151/83 Pulse: 82   Resp: 17 SpO2: 100 % O2 Device: Simple face mask Oxygen Delivery: 5 LPM Height: 162.6 cm (5' 4\") Weight: 67.7 kg (149 lb 4 oz)  Estimated body mass index is 25.62 kg/m  as calculated from the following:    Height as of this encounter: 1.626 m (5' 4\").    Weight as of this encounter: 67.7 kg (149 lb 4 oz).    General: Very pleasant female resting comfortably in hospital bed.  Awake, alert, interactive.  HEENT: Normocephalic, atraumatic.  PERRL, EOMI.  Conjunctiva clear, sclerae anicteric.  Mucous membranes moist.  Respiratory: Normal work of breathing.    GI: Abdomen soft, nontender, nondistended.  : Deferred.  Musculoskeletal: Moving all extremities appropriately.  Skin: No rashes or abrasions on exposed skin.  Neurologic: Alert and oriented x4.  Cranial nerves II through XII grossly intact.  Psychologic: Appropriate mood and affect.         Primary Care Physician   Sherry Barker Hay    Discharge Orders      When to call - Contact Surgeon Team    You may experience symptoms that require follow-up before your scheduled appointment. Contact your Surgeon Team if you are concerned about pain " control, large amount of bleeding, blood clots, constipation, or if you experience signs of infection (fever, growing tenderness at the surgery site, a large amount of drainage, severe pain, foul-smelling drainage, redness or swelling.     When to call - Reach out to Urgent Care    If you are experiencing uncontrolled Nausea and Vomiting, uncontrolled pain, inability to urinate and uncomfortable, and in need of immediate care, and you are NOT able to reach your Surgeon Team, go to an Urgent Care clinic. Do NOT go to the Emergency Room unless you have shortness of breath, chest pain, or other signs of a medical emergency.     When to call - Reasons to Call 911    Call 911 immediately if you experience sudden-onset chest pain, arm weakness/numbness, slurred speech, or shortness of breath     Symptoms - Fever Management    A low grade fever can be expected after surgery. Your Provider many have prescribed an Opioid pain medication that also contains acetaminophen (TYLENOL) that may help with Fever management.  Do NOT take additional acetaminophen (TYLENOL) in combination with an Opioid/acetaminophen (TYLENOL) product. Read the labels on your Over The Counter (OTC) medications with care.     Symptoms - Reduced Urine Output    If it has been greater than 8 hours since you have urinated despite drinking plenty of water, call your Surgeon Team.     No driving or operating machinery    Do NOT drive any vehicle or operate mechanical equipment for 24 hours following the end of your surgery.  Even though you may feel normal, your reactions may be affected by Anesthesia medication you received.     No Alcohol    Do NOT drink alcoholic beverages for 24 hours following your surgery and while taking pain medications.     Diet Instructions    Follow your surgeon's orders for any diet restrictions.  If you did not receive any diet restrictions, you may drink clear liquids (apple juice, ginger ale, 7-up, broth, etc.), and progress to  your regular diet as you feel able. It is important to stay well-hydrated after surgery and drink plenty of water.     Discharge Instructions - Comfort and Pain Management    Pain after surgery is normal and expected. You will have some amount of pain after surgery. Your pain will improve with time. There are several things you can do to help reduce your pain including: rest, ice, and using pain medications as needed. Use pain interventions and don't wait until pain level is out of control. Contact your Surgeon Team if you have pain that persists or worsens after surgery despite rest, ice, and taking your medication(s) as prescribed. You may have a dry mouth, a sore throat, muscles aches or trouble sleeping, and these symptoms should go away after 24 hours.     Discharge Instructions - Rest    Rest and relax for the next 24 hours. Make arrangements to have someone stay with you overnight, and avoid hazardous and strenuous activities.  Do NOT make any important decisions for the next 24 hours.       Significant Results and Procedures   Most Recent 3 CBC's:  Recent Labs   Lab Test 01/05/25  0633 01/04/25  0838 01/03/25  2311   WBC 6.8 11.8* 11.5*   HGB 10.5* 11.1* 11.9   MCV 89 89 89    315 333     Most Recent 3 BMP's:  Recent Labs   Lab Test 01/05/25  0633 01/04/25  0838 01/03/25  2311    138 138   POTASSIUM 3.6 4.2 4.1   CHLORIDE 108* 104 105   CO2 19* 22 20*   BUN 6.9 8.6 12.2   CR 0.54 0.49* 0.53   ANIONGAP 12 12 13   JUAN 8.2* 9.1 9.3   GLC 88 102* 153*     Most Recent 2 LFT's:  Recent Labs   Lab Test 01/05/25  0633 01/03/25  2311   * 236*   * 235*   ALKPHOS 197* 203*   BILITOTAL 0.9 0.7   ,   Results for orders placed or performed during the hospital encounter of 01/04/25   US Abdomen Limited    Narrative    EXAM: US ABDOMEN LIMITED  LOCATION: [facility]  DATE: 1/4/2025    INDICATION: ruq pain  COMPARISON: None.  TECHNIQUE: Limited abdominal  ultrasound.    FINDINGS:    GALLBLADDER: Sludge with multiple calculi and shadowing, echogenic foci along the gallbladder wall representing adenomyomatosis. No sonographic Lee sign.    BILE DUCTS: No biliary dilatation. The common duct measures 4 mm.    LIVER: Increased hepatic echogenicity suggests steatosis. No focal mass.    RIGHT KIDNEY: No hydronephrosis.    PANCREAS: The visualized portions are normal.    No ascites.      Impression    IMPRESSION:  1.  Gallbladder sludge, cholelithiasis, and adenomyomatosis. No sonographic evidence of acute disease, however.  2.  Hepatic steatosis.       CT Abdomen Pelvis w Contrast    Narrative    EXAM: CT ABDOMEN PELVIS W CONTRAST  LOCATION: [facility]  DATE: 1/4/2025    INDICATION: abdominal pain  COMPARISON: Ultrasound 1/4/2025.  TECHNIQUE: CT scan of the abdomen and pelvis was performed following injection of IV contrast. Multiplanar reformats were obtained. Dose reduction techniques were used.  CONTRAST: 75mL Isovue 370    FINDINGS:   LOWER CHEST: There is a 10 mm soft tissue nodule in the medial right breast approximately 6 cm from the nipple.    HEPATOBILIARY: The gallbladder wall is prominent though was within normal limits on earlier ultrasound. No calcified gallstone.    PANCREAS: Normal.    SPLEEN: Normal.    ADRENAL GLANDS: Normal.    KIDNEYS/BLADDER: Normal.    BOWEL: There is no bowel obstruction or inflammation. The appendix is normal. Large amount of food debris in the stomach. No free intraperitoneal gas or fluid.    LYMPH NODES: Normal.    VASCULATURE: Normal.    PELVIC ORGANS: The uterus is enlarged by multiple fibroids. There is a 1.5 cm polyp or fibroid within the endometrial cavity. There is a 2.2 cm collapsing right ovarian follicle, within normal limits. There is an ovoid left adnexal cyst measuring 4.8 x   3.0 x 4.7 cm. There is also a fluid-filled tubular structure in left adnexa.    MUSCULOSKELETAL: Normal.      Impression     IMPRESSION:   1.  There is a 4.8 cm left ovarian cyst and probable left hydrosalpinx.  2.  The uterus is enlarged by multiple fibroids.  3.  Endometrial polyp or fibroid.  4.  No bowel obstruction or inflammation.  5.  10 mm soft tissue nodule in the right breast. Mammography follow-up recommended.    REFERENCE:  Management of Incidental Adnexal Findings on CT and MRI: A White Paper of the ACR Incidental Findings Committee. J Am Dayna Radiol 2020; 17(2):248-254.    Premenopausal or <50 if status unkown:    Equal to or <5 cm: No further imaging.  >5 cm on CT: Ultrasound.  >5 cm on MRI: Ultrasound in 6-12 months if not fully characterized. No follow-up if fully characterized.  >7 cm on MRI: Ultrasound in 6-12 months even if fully characterized.       MR Abdomen MRCP w/o & w Contrast    Narrative    EXAM: MR ABDOMEN MRCP WITHOUT AND WITH CONTRAST  LOCATION: [facility]  DATE: 01/04/2025    INDICATION: Rule out choledocholithiasis.  COMPARISON: CT 01/04/2025 and ultrasound 01/04/2025.  TECHNIQUE: Routine MR liver/pancreas protocol including axial and coronal MRCP sequences. 2D and 3D reconstruction performed by MR technologist including MIP reconstruction and slab cholangiograms. If performed with contrast, additional dynamic T1 post   IV contrast images.  CONTRAST: 7 mL Gadavist.     FINDINGS:     MRCP: The gallbladder is filled with stone and sludge material. No surrounding inflammatory changes. No evidence for biliary ductal dilatation or filling defects in the common bile duct.    LIVER: Mild geographic fatty infiltration of the liver. No evidence for liver mass.    PANCREAS: No inflammatory changes, mass or ductal dilatation.    ADDITIONAL FINDINGS: The spleen, adrenal glands and kidneys are unremarkable. No ascites or adenopathy. 10 mm enhancing nodule medial right mid breast.      Impression    IMPRESSION:  1.  Gallbladder is filled with stone and sludge material. No distention or  inflammation.  2.  No evidence for biliary ductal dilatation. No filling defects in the common bile duct.  3.  Mild hepatic steatosis.  4.  10 mm enhancing nodule medial right mid breast. Further evaluation with mammography if this has not already been performed.           Discharge Medications   Current Discharge Medication List        START taking these medications    Details   ondansetron (ZOFRAN ODT) 4 MG ODT tab Take 1 tablet (4 mg) by mouth every 8 hours as needed for nausea.  Qty: 10 tablet, Refills: 0    Associated Diagnoses: Acute gallstone pancreatitis      oxyCODONE (ROXICODONE) 5 MG tablet Take 1-2 tablets (5-10 mg) by mouth every 4 hours as needed for moderate to severe pain.  Qty: 20 tablet, Refills: 0    Associated Diagnoses: Acute gallstone pancreatitis           CONTINUE these medications which have NOT CHANGED    Details   Ascorbic Acid (VITAMIN C PO) Take 1 tablet by mouth daily.      cholecalciferol (VITAMIN D3) 25 mcg (1000 units) capsule Take 1 capsule by mouth daily.      ferrous sulfate (FEROSUL) 325 (65 Fe) MG tablet Take 325 mg by mouth daily as needed.      losartan (COZAAR) 50 MG tablet Take 1 tablet (50 mg) by mouth daily.  Qty: 60 tablet, Refills: 0    Associated Diagnoses: Benign essential hypertension      Multiple Vitamin (MULTIVITAMIN ADULT PO)            Allergies   No Known Allergies

## 2025-01-05 NOTE — CONSULTS
Chief complaint:  Abdominal pain, epigastric    HPI:  This patient is a 49 year old female who presents with upper abdominal pain going through to the back.  She was found to have gallstones and elevated lipase.  The patient is currently scheduled to undergo a hysterectomy with robotic assistance on the 22nd of this month.  She feels the pain has improved significantly since she was admitted.    Past Medical History:   has a past medical history of Hypertension.    Past Surgical History:  Past Surgical History:   Procedure Laterality Date    AS REMOVAL OF OVARIAN CYST(S) Left 2016    GYN SURGERY  2016        Social History:  Social History     Socioeconomic History    Marital status:      Spouse name: Not on file    Number of children: Not on file    Years of education: Not on file    Highest education level: Not on file   Occupational History    Not on file   Tobacco Use    Smoking status: Never    Smokeless tobacco: Never   Vaping Use    Vaping status: Never Used   Substance and Sexual Activity    Alcohol use: Never    Drug use: Never    Sexual activity: Yes     Partners: Male     Birth control/protection: Condom   Other Topics Concern    Parent/sibling w/ CABG, MI or angioplasty before 65F 55M? No   Social History Narrative    Not on file     Social Drivers of Health     Financial Resource Strain: Low Risk  (1/4/2025)    Financial Resource Strain     Within the past 12 months, have you or your family members you live with been unable to get utilities (heat, electricity) when it was really needed?: No   Food Insecurity: Low Risk  (1/4/2025)    Food Insecurity     Within the past 12 months, did you worry that your food would run out before you got money to buy more?: No     Within the past 12 months, did the food you bought just not last and you didn t have money to get more?: No   Transportation Needs: Low Risk  (1/4/2025)    Transportation Needs     Within the past 12 months, has lack of transportation  kept you from medical appointments, getting your medicines, non-medical meetings or appointments, work, or from getting things that you need?: No   Physical Activity: Insufficiently Active (5/14/2024)    Exercise Vital Sign     Days of Exercise per Week: 1 day     Minutes of Exercise per Session: 10 min   Stress: No Stress Concern Present (5/14/2024)    [employer] - Occupational Stress Questionnaire     Feeling of Stress : Not at all   Social Connections: Moderately Integrated (5/14/2024)    Social Connection and Isolation Panel [NHANES]     Frequency of Communication with Friends and Family: More than three times a week     Frequency of Social Gatherings with Friends and Family: Once a week     Attends [employer] Services: More than 4 times per year     Active Member of Clubs or Organizations: No     Attends Club or Organization Meetings: Never     Marital Status:    Interpersonal Safety: Low Risk  (1/3/2025)    Interpersonal Safety     Do you feel physically and emotionally safe where you currently live?: Yes     Within the past 12 months, have you been hit, slapped, kicked or otherwise physically hurt by someone?: No     Within the past 12 months, have you been humiliated or emotionally abused in other ways by your partner or ex-partner?: No   Housing Stability: Low Risk  (1/4/2025)    Housing Stability     Do you have housing? : Yes     Are you worried about losing your housing?: No        Family History:  Family History   Problem Relation Age of Onset    Hypertension Mother     Diabetes Father     Hypertension Father     Breast Cancer Sister 46    Breast Cancer Sister     Hypertension Brother     Colon Cancer No family hx of        Review of Systems:  The 10 point Review of Systems is negative other than noted in the HPI and above.    Physical Exam:  General - This is a well developed, well nourished female in no apparent distress.  HEENT - Normocephalic, atraumatic.  No  scleral icterus.  Neck - supple without masses  Lungs - clear to ascultation.    Heart - Regular rate & rhythm without murmur  Abdomen:   soft, non-distended without significant tenderness.    Extremities - warm without edema  Neurologic - nonfocal    Relevant labs:  Alkaline phosphatase 203, ,  and total bilirubin 0.7.  Lipase was 2386 on admission.    Imaging:  Gallbladder ultrasound reveals findings gallstones and sludge.  There are also findings consistent with adenomyomatosis.  MRCP did not show any evidence of ductal dilatation or common duct stones.    Assessment and Plan:  This is a patient with gallstone pancreatitis was had significant improvement in her symptoms.  Typically, I would recommend laparoscopic cholecystectomy with cholangiogram either prior to discharge, or shortly afterwards.  The patient has inquired whether it might make sense to try to combine her upcoming hysterectomy with a cholecystectomy.  I told her I was not sure that the logistics would allow this to happen at the time she has her surgery scheduled, but I do think it would likely be possible to do both surgeries together.  It would, however, involved some risk of having another stone pass and another flare of pancreatitis prior to the surgery.  We could theoretically go ahead with her surgery tomorrow, assuming the OR schedule allows this.  The patient will contemplate her options overnight.  I will recheck her labs in the morning.      Yury Oswald MD  [facility]

## 2025-01-05 NOTE — DISCHARGE INSTRUCTIONS
HOME CARE FOLLOWING LAPAROSCOPIC CHOLECYSTECTOMY  NILES Huynh, BURT Plaza C. Pratt, J. Shaheen    INCISIONAL CARE:  Replace the bandage over your incisions DAILY until all drainage stops, or if more comfortable to have in place.  If present, leave the steri-strips (white paper tapes) in place for 14 days after surgery.  If Dermabond (a type of skin glue) is present, leave in place until it wears/flakes off (2-3 weeks).     BATHING:  OK to shower 48 hours after surgery.  Avoid baths for 1 week after surgery.  You may wash your hair at any time.  Gently pat your incision dry after bathing.  Do not apply lotions, creams, or ointments to incisions.    ACTIVITY:  Light Activity -- you may immediately be up and about as tolerated.  Walking is encouraged, increase as tolerated.  Driving/Light Work-- when comfortable and off narcotic pain medications.  Strenuous Work/Activity -- limit lifting to 20 pounds for 2 weeks.  Progressively increase with time.  Active Sports (running, biking, etc.) -- cautiously resume after 2 weeks.    DISCOMFORT:  Local anesthetic placed at surgery should provide relief for 4-8 hours.  Begin taking pain pills before discomfort is severe.  Take the pain medication with some food, when possible, to minimize side effects.  Intermittent use of ice packs may help during the first 1-3 weeks after surgery.  Expect gradual improvement.    Over-the-counter anti-inflammatory medications (i.e. Ibuprofen/Advil/Motrin or Naprosyn/Aleve) may be used per package instructions in addition to or while tapering off the narcotic pain medications to decrease swelling and sensitivity.  DO NOT TAKE these Anti-inflammatory medications if your primary physician has advised against doing so, or if you have acid reflux, ulcer, or bleeding disorder, or take blood-thinner medications.  Call your primary physician or the surgery office if you have medication questions.    After laparoscopic  cholecystectomy, you may have shoulder or upper back discomfort due to the gas used during surgery.  This is temporary and should resolve within 2-3 days.  Frequent short walks may help with this.  You may have decreased energy level for 1-2 weeks after surgery related to your recovery.    DIET:  Start with liquids and gradually increase diet as tolerated.  Drink plenty of fluids.  While taking pain medications, consider use of a stool softener, increase your fiber in your diet, or add a fiber supplement (like Metamucil, Citrucel) to help prevent constipation - a possible side effect of pain medications.  It is not uncommon to experience some bowel changes (loose stools or constipation) after surgery.  Your body has to adapt to you no longer having a gall bladder.  To help minimize this side effect, avoid fatty foods for 1-2 weeks after surgery.  You may then slowly increase the amount of fatty foods in your diet.      NAUSEA:  If nauseated from the anesthetic/pain meds; rest in bed, get up cautiously with assistance, and drink clear liquids (juice, tea, broth).    FOLLOW-UP AFTER SURGERY:  -Our office will contact you approximately 2-3 weeks after surgery to check on your progress and answer any questions you may have.  If you are doing well, you will not need to return for an office appointment.  If any concerns are identified over the phone, we will help you make an appointment to see a provider.    -If you have not received a phone call, have any questions or concerns, or would like to be seen, please call us at [phone].  We are located at: [address], [address]; [place] [address]    -CONTACT US IF THE FOLLOWING DEVELOPS:   1. A fever that is above 101     2. Increased redness, warmth, drainage, bleeding, or swelling.   3. Pain that is not relieved by rest/ice and your prescription.   4.  Increasing pain after 48 hours.   5. Drainage that is thick, cloudy, yellow, green or white.   6. Any other  questions or concerns.      FREQUENTLY ASKED QUESTIONS:    Q:  How should my incision look?    A:  Normally your incision will appear slightly swollen with light redness directly along the incision itself as it heals.  It may feel like a bump or ridge as the healing/scarring happens, and over time (3-4 months) this bump or ridge feeling should slowly go away.  In general, clear or pink watery drainage can be normal at first as your incision heals, but should decrease over time.    Q:  How do I know if my incision is infected?  A:  Look at your incision for signs of infection, like redness around the incision spreading to surrounding skin, or drainage of cloudy or foul-smelling drainage.  If you feel warm, check your temperature to see if you are running a fever.    **If any of these things occur, please notify the nurse at our office.  We may need you to come into the office for an incision check.      Q:  How do I take care of my incision?  A:  If you have a dressing in place - Starting the day after surgery, replace the dressing 1-2 times a day until there is no further drainage from the incision.  At that time, a dressing is no longer needed.  Try to minimize tape on the skin if irritation is occurring at the tape sites.  If you have significant irritation from tape on the skin, please call the office to discuss other method of dressing your incision.    Small pieces of tape called  steri-strips  may be present directly overlying your incision; these may be removed 10 days after surgery unless otherwise specified by your surgeon.  If these tapes start to loosen at the ends, you may trim them back until they fall off or are removed.    A:  If you had  Dermabond  tissue glue used as a dressing (this causes your incision to look shiny with a clear covering over it) - This type of dressing wears off with time and does not require more dressings over the top unless it is draining around the glue as it wears off.  Do  not apply ointments or lotions over the incisions until the glue has completely worn off.    Q:  There is a piece of tape or a sticky  lead  still on my skin.  Can I remove this?  A:  Sometimes the sticky  leads  used for monitoring during surgery or for evaluation in the emergency department are not all removed while you are in the hospital.  These sometimes have a tab or metal dot on them.  You can easily remove these on your own, like taking off a band-aid.  If there is a gel substance under the  lead , simply wipe/clean it off with a washcloth or paper towel.      Q:  What can I do to minimize constipation (very hard stools, or lack of stools)?  A:  Stay well hydrated.  Increase your dietary fiber intake or take a fiber supplement -with plenty of water.  Walk around frequently.  You may consider an over-the-counter stool-softener.  Your Pharmacist can assist you with choosing one that is stocked at your pharmacy.  Constipation is also one of the most common side effects of pain medication.  If you are using pain medication, be pro-active and try to PREVENT problems with constipation by taking the steps above BEFORE constipation becomes a problem.    Q:  What do I do if I need more pain medications?  A:  Call the office to receive refills.  Be aware that certain pain meds cannot be called into a pharmacy and actually require a paper prescription.  A change may be made in your pain med as you progress thru your recovery period or if you have side effects to certain meds.    --Pain meds are NOT refilled after 5pm on weekdays, and NOT AT ALL on the weekends, so please look ahead to prevent problems.      Q:  Why am I having a hard time sleeping now that I am at home?  A:  Many medications you receive while you are in the hospital can impact your sleep for a number of days after your surgery/hospitalization.  Decreased level of activity and naps during the day may also make sleeping at night difficult.  Try to  minimize day-time naps, and get up frequently during the day to walk around your home during your recovery time.  Sleep aides may be of some help, but are not recommended for long-term use.      Q:  I am having some back discomfort.  What should I do?  A:  This may be related to certain positioning that was required for your surgery, extended periods of time in bed, or other changes in your overall activity level.  You may try ice, heat, acetaminophen, or ibuprofen to treat this temporarily.  Note that many pain medications have acetaminophen in them and would state this on the prescription bottle.  Be sure not to exceed the maximum of 4000mg per day of acetaminophen.     **If the pain you are having does not resolve, is severe, or is a flare of back pain you have had on other occasions prior to surgery, please contact your primary physician for further recommendations or for an appointment to be examined at their office.    Q:  Why am I having headaches?  A:  Headaches can be caused by many things:  caffeine withdrawal, use of pain meds, dehydration, high blood pressure, lack of sleep, over-activity/exhaustion, flare-up of usual migraine headaches.  If you feel this is related to muscle tension (a band-like feeling around the head, or a pressure at the low-back of the head) you may try ice or heat to this area.  You may need to drink more fluids (try electrolyte drink like Gatorade), rest, or take your usual migraine medications.   **If your headaches do not resolve, worsen, are accompanied by other symptoms, or if your blood pressure is high, please call your primary physician for recommendation and/or examination.    Q:  I am unable to urinate.  What do I do?  A:  A small percentage of people can have difficulty urinating initially after surgery.  This includes being able to urinate only a very small amount at a time and feeling discomfort or pressure in the very low abdomen.  This is called  urinary retention ,  and is actually an urgent situation.  Proceed to your nearest Emergency department for evaluation (not an Urgent Care Center).  Sometimes the bladder does not work correctly after certain medications you receive during surgery, or related to certain procedures.  You may need to have a catheter placed until your bladder recovers.  When planning to go to an Emergency department, it may help to call the ER to let them know you are coming in for this problem after a surgery.  This may help you get in quicker to be evaluated.  **If you have symptoms of a urinary tract infection, please contact your primary physician for the proper evaluation and treatment.          If you have other questions, please call the office Monday thru Friday between 8am and 4:30pm to discuss with the nurse or physician assistant.  [phone]    There is a surgeon ON CALL on weekday evenings and over the weekend in case of urgent need only, and may be contacted at the same number.    If you are having an emergency, call 911 or proceed to your nearest emergency department.    You received Toradol, an IV form of Ibuprofen (Motrin) at 1:30 pm.  Do not take any Ibuprofen products until 7:30 pm.     Maximum acetaminophen (Tylenol) dose from all sources should not exceed 4 grams (4000 mg) per day.

## 2025-01-05 NOTE — ANESTHESIA CARE TRANSFER NOTE
Patient: Piyush Feldman    Procedure: Procedure(s):  Laparoscopic cholecystectomy with cholangiopgram       Diagnosis: Acute gallstone pancreatitis [K85.10]  Diagnosis Additional Information: No value filed.    Anesthesia Type:   General     Note:    Oropharynx: oropharynx clear of all foreign objects  Level of Consciousness: awake and drowsy  Oxygen Supplementation: face mask  Level of Supplemental Oxygen (L/min / FiO2): 8  Independent Airway: airway patency satisfactory and stable    Vital Signs Stable: post-procedure vital signs reviewed and stable  Report to RN Given: handoff report given  Patient transferred to: PACU    Handoff Report: Identifed the Patient, Identified the Reponsible Provider, Reviewed the pertinent medical history, Discussed the surgical course, Reviewed Intra-OP anesthesia mangement and issues during anesthesia, Set expectations for post-procedure period and Allowed opportunity for questions and acknowledgement of understanding      Vitals:  Vitals Value Taken Time   /80    Temp     Pulse 87 01/05/25 1301   Resp 25 01/05/25 1301   SpO2 100 % 01/05/25 1301   Vitals shown include unfiled device data.    Electronically Signed By: ROBEL Hannon CRNA  January 5, 2025  1:03 PM

## 2025-01-06 ENCOUNTER — MYC MEDICAL ADVICE (OUTPATIENT)
Dept: OBGYN | Facility: CLINIC | Age: 50
End: 2025-01-06
Payer: COMMERCIAL

## 2025-01-06 ENCOUNTER — TELEPHONE (OUTPATIENT)
Dept: FAMILY MEDICINE | Facility: CLINIC | Age: 50
End: 2025-01-06
Payer: COMMERCIAL

## 2025-01-06 LAB
ATRIAL RATE - MUSE: 79 BPM
DIASTOLIC BLOOD PRESSURE - MUSE: NORMAL MMHG
INTERPRETATION ECG - MUSE: NORMAL
P AXIS - MUSE: 48 DEGREES
PR INTERVAL - MUSE: 142 MS
QRS DURATION - MUSE: 76 MS
QT - MUSE: 398 MS
QTC - MUSE: 456 MS
R AXIS - MUSE: 13 DEGREES
SYSTOLIC BLOOD PRESSURE - MUSE: NORMAL MMHG
T AXIS - MUSE: 54 DEGREES
VENTRICULAR RATE- MUSE: 79 BPM

## 2025-01-06 NOTE — TELEPHONE ENCOUNTER
Please address the my chart message.  Pt is scheduled to have surgery with you on 1/22/25.      SARITA Rasmussen RN

## 2025-01-06 NOTE — TELEPHONE ENCOUNTER
"  Transitions of Care Outreach  Chief Complaint   Patient presents with    Hospital F/U     Abdominal pain, unspecified abdominal location Acute pancreatitis, unspecified complication status, unspecified pancreatitis type         Most Recent Admission Date: 1/4/2025   Most Recent Admission Diagnosis: Transaminitis - R74.01  Breast nodule - N63.0  Left ovarian cyst - N83.202  Abdominal pain, unspecified abdominal location - R10.9  Acute pancreatitis, unspecified complication status, unspecified pancreatitis type - K85.90     Most Recent Discharge Date: 1/5/2025   Most Recent Discharge Diagnosis: Abdominal pain, unspecified abdominal location - R10.9  Acute pancreatitis, unspecified complication status, unspecified pancreatitis type - K85.90  Transaminitis - R74.01  Left ovarian cyst - N83.202  Breast nodule - N63.0  Acute gallstone pancreatitis - K85.10     Transitions of Care Assessment    Discharge Assessment  How are you doing now that you are home?: \"still having a little pain\"  How are your symptoms? (Red Flag symptoms escalate to triage hotline per guidelines): Improved  Do you know how to contact your clinic care team if you have future questions or changes to your health status? : Yes  Does the patient have their discharge instructions? : Yes  Does the patient have questions regarding their discharge instructions? : No  Were you started on any new medications or were there changes to any of your previous medications? : Yes (ondansetron (ZOFRAN ODT) oxyCODONE (ROXICODONE))  Does the patient have all of their medications?: Yes  Do you have questions regarding any of your medications? : No    Follow up Plan     Discharge Follow-Up  Discharge follow up appointment scheduled in alignment with recommended follow up timeframe or Transitions of Risk Category? (Low = within 30 days; Moderate= within 14 days; High= within 7 days): Yes  Discharge Follow Up Appointment Date: 01/15/25  Discharge Follow Up Appointment " Scheduled with?: Primary Care Provider    Future Appointments   Date Time Provider Department Center   1/15/2025  9:30 AM Coming Sherry Lazaro MD [facility]     Patient is changing her appointment on 1/15/25 from a pre-op to a hospital follow up.    Outpatient Plan as outlined on AVS reviewed with patient.    For any urgent concerns, please contact our 24 hour nurse triage line: [phone] ([phone])       Carolin Murguia RN

## 2025-01-06 NOTE — TELEPHONE ENCOUNTER
Hospital   1/4/2025 - 1/5/2025 (40 hours)  [facility]    Diagnosis  Abdominal pain, unspecified abdominal location  Acute pancreatitis, unspecified complication status, unspecified pancreatitis type    Medications   START taking:  ondansetron (ZOFRAN ODT)  oxyCODONE (ROXICODONE)    Follow Up Instructions     Patient scheduled for preop 1/15/2025, discuss with patient if she would like an additional hospital follow up, RN does not believe a hosp follow-up and preop can be scheduled together     Surgeons office [phone], They will contact patient 2-3 weeks for follow up   If any of the below symptoms patient to contact surgeons office   1. A fever that is above 101   2. Increased redness, warmth, drainage, bleeding, or swelling.  3. Pain that is not relieved by rest/ice and your prescription.  4. Increasing pain after 48 hours.  5. Drainage that is thick, cloudy, yellow, green or white.    Routing to AV MOHIT Rae, Registered Nurse  [facility]

## 2025-01-06 NOTE — TELEPHONE ENCOUNTER
ED / Discharge Outreach Protocol    Patient Contact    Attempt # 1    Was call answered?  No.  Left message on voicemail with information to call back.    Norah Rae, Registered Nurse  [facility]

## 2025-01-07 NOTE — TELEPHONE ENCOUNTER
Poor thing!  Yes I think the emergency surgery trumps the electively timed procedure.    I'll have Baylee reach out to reschedule.  Usually they recommend at least 6 weeks of healing before the next surgery.

## 2025-01-08 NOTE — TELEPHONE ENCOUNTER
Patient would like to schedule at the end of July or early August 2025.  Will call patient back to schedule once the call schedule is posted.

## 2025-01-23 NOTE — TELEPHONE ENCOUNTER
Type of surgery: ROBOT ASSISTED TOTAL LAPAROSCOPIC HYSTERECTOMY, BILATERAL SALPINGECTOMY, LEFT OVARIAN CYSTECTOMY  Location of surgery: [facility]  Date and time of surgery: 7/23/25 @ 10:25 AM  Surgeon: DR WALESKA STOVALL  Pre-Op Appt Date: PATIENT ADVISED TO SCHEDULE  Post-Op Appt Date:    Packet sent out: Yes  Pre-cert/Authorization completed:  No  Date: 1/23/25

## 2025-01-27 ENCOUNTER — OFFICE VISIT (OUTPATIENT)
Dept: FAMILY MEDICINE | Facility: CLINIC | Age: 50
End: 2025-01-27
Payer: COMMERCIAL

## 2025-01-27 VITALS
WEIGHT: 145 LBS | DIASTOLIC BLOOD PRESSURE: 94 MMHG | BODY MASS INDEX: 24.75 KG/M2 | OXYGEN SATURATION: 100 % | SYSTOLIC BLOOD PRESSURE: 145 MMHG | HEART RATE: 93 BPM | HEIGHT: 64 IN | RESPIRATION RATE: 12 BRPM | TEMPERATURE: 98.2 F

## 2025-01-27 DIAGNOSIS — K85.90 ACUTE PANCREATITIS, UNSPECIFIED COMPLICATION STATUS, UNSPECIFIED PANCREATITIS TYPE: ICD-10-CM

## 2025-01-27 DIAGNOSIS — Z09 HOSPITAL DISCHARGE FOLLOW-UP: Primary | ICD-10-CM

## 2025-01-27 DIAGNOSIS — R19.7 DIARRHEA DUE TO MALABSORPTION: ICD-10-CM

## 2025-01-27 DIAGNOSIS — I10 BENIGN ESSENTIAL HYPERTENSION: ICD-10-CM

## 2025-01-27 DIAGNOSIS — K90.9 DIARRHEA DUE TO MALABSORPTION: ICD-10-CM

## 2025-01-27 DIAGNOSIS — Z90.49 S/P CHOLECYSTECTOMY: ICD-10-CM

## 2025-01-27 LAB
ERYTHROCYTE [DISTWIDTH] IN BLOOD BY AUTOMATED COUNT: 14.7 % (ref 10–15)
HCT VFR BLD AUTO: 35.6 % (ref 35–47)
HGB BLD-MCNC: 11.7 G/DL (ref 11.7–15.7)
MCH RBC QN AUTO: 29.2 PG (ref 26.5–33)
MCHC RBC AUTO-ENTMCNC: 32.9 G/DL (ref 31.5–36.5)
MCV RBC AUTO: 89 FL (ref 78–100)
PLATELET # BLD AUTO: 370 10E3/UL (ref 150–450)
RBC # BLD AUTO: 4.01 10E6/UL (ref 3.8–5.2)
WBC # BLD AUTO: 9.8 10E3/UL (ref 4–11)

## 2025-01-27 PROCEDURE — 80053 COMPREHEN METABOLIC PANEL: CPT | Performed by: FAMILY MEDICINE

## 2025-01-27 PROCEDURE — G2211 COMPLEX E/M VISIT ADD ON: HCPCS | Performed by: FAMILY MEDICINE

## 2025-01-27 PROCEDURE — 85027 COMPLETE CBC AUTOMATED: CPT | Performed by: FAMILY MEDICINE

## 2025-01-27 PROCEDURE — 36415 COLL VENOUS BLD VENIPUNCTURE: CPT | Performed by: FAMILY MEDICINE

## 2025-01-27 PROCEDURE — 99213 OFFICE O/P EST LOW 20 MIN: CPT | Performed by: FAMILY MEDICINE

## 2025-01-27 RX ORDER — LOSARTAN POTASSIUM 50 MG/1
50 TABLET ORAL DAILY
Qty: 90 TABLET | Refills: 3 | Status: SHIPPED | OUTPATIENT
Start: 2025-01-27

## 2025-01-27 NOTE — PROGRESS NOTES
{PROVIDER CHARTING PREFERENCE:659056}    Mark Pickett is a 49 year old, presenting for the following health issues:  Hospital F/U and Surgical Followup        1/3/2025    12:55 PM   Additional Questions   Roomed by Cuca   Accompanied by self     HPI       Hospital Follow-up Visit:    Hospital/Nursing Home/IP Rehab Facility: [facility]  Date of Admission: 1/4/25  Date of Discharge: 1/5/25  Reason(s) for Admission: Abdominal pain, elevation ALT, PST, -had cholecystectomy was done   Was the patient in the ICU or did the patient experience delirium during hospitalization?  No  Do you have any other stressors you would like to discuss with your provider? No    Problems taking medications regularly:  None  Medication changes since discharge: pain meds  Problems adhering to non-medication therapy:  None    Summary of hospitalization:  [facility] discharge summary reviewed  Diagnostic Tests/Treatments reviewed.  Follow up needed: none  Other Healthcare Providers Involved in Patient s Care:         None  Update since discharge: improved. {TIP  Include information from family/caregivers, SNF, Care Coordination :092599}        Plan of care communicated with patient     {Reference  Coding guidelines- Moderate Complexity F2F/Video within 7 - 14 days of discharge [id number], High Complexity F2F/Video within 7 days [id number] or [id number] days [id number] :[id number]}    From time to time she has diarrhea since the cholecystectomy. She is afraid to eat fatty food, wonders about fat soluble vitamins, if she will have problems with these now and developing deficiencies. No pain in the abdomen, 5 incision sites healing well.    She went on vacation, developed ankle swelling.  Nifedipine was changed to Losartan, has not had any swelling since then. Blood pressure has been higher since her surgery, 140-150/90s on occasion, but not always.  She did have two low pressures under 100 systolic.  She  "thinks the 75 mg would be too high          Objective    BP (!) 145/94 (BP Location: Right arm, Patient Position: Chair, Cuff Size: Adult Regular)   Pulse 93   Temp 98.2  F (36.8  C) (Oral)   Resp 12   Ht 1.626 m (5' 4\")   Wt 65.8 kg (145 lb)   LMP 01/15/2025 (Exact Date)   SpO2 100%   BMI 24.89 kg/m    Body mass index is 24.89 kg/m .  Physical Exam   {Exam List (Optional):870002}    {Diagnostic Test Results (Optional):260448}        Signed Electronically by: Sherry Lazaro MD  {Email feedback regarding this note to [email or web address]   :124919}  "

## 2025-01-28 LAB
ALBUMIN SERPL BCG-MCNC: 4.3 G/DL (ref 3.5–5.2)
ALP SERPL-CCNC: 104 U/L (ref 40–150)
ALT SERPL W P-5'-P-CCNC: 20 U/L (ref 0–50)
ANION GAP SERPL CALCULATED.3IONS-SCNC: 13 MMOL/L (ref 7–15)
AST SERPL W P-5'-P-CCNC: 19 U/L (ref 0–45)
BILIRUB SERPL-MCNC: 0.5 MG/DL
BUN SERPL-MCNC: 8.8 MG/DL (ref 6–20)
CALCIUM SERPL-MCNC: 9.7 MG/DL (ref 8.8–10.4)
CHLORIDE SERPL-SCNC: 100 MMOL/L (ref 98–107)
CREAT SERPL-MCNC: 0.48 MG/DL (ref 0.51–0.95)
EGFRCR SERPLBLD CKD-EPI 2021: >90 ML/MIN/1.73M2
GLUCOSE SERPL-MCNC: 90 MG/DL (ref 70–99)
HCO3 SERPL-SCNC: 26 MMOL/L (ref 22–29)
POTASSIUM SERPL-SCNC: 3.6 MMOL/L (ref 3.4–5.3)
PROT SERPL-MCNC: 7.6 G/DL (ref 6.4–8.3)
SODIUM SERPL-SCNC: 139 MMOL/L (ref 135–145)

## 2025-02-08 PROBLEM — Z90.49 S/P CHOLECYSTECTOMY: Status: ACTIVE | Noted: 2025-02-08

## 2025-02-08 PROBLEM — K90.9 DIARRHEA DUE TO MALABSORPTION: Status: ACTIVE | Noted: 2025-02-08

## 2025-02-08 PROBLEM — R19.7 DIARRHEA DUE TO MALABSORPTION: Status: ACTIVE | Noted: 2025-02-08

## 2025-02-23 ENCOUNTER — MYC REFILL (OUTPATIENT)
Dept: FAMILY MEDICINE | Facility: CLINIC | Age: 50
End: 2025-02-23
Payer: COMMERCIAL

## 2025-02-23 DIAGNOSIS — D64.9 ANEMIA, UNSPECIFIED TYPE: Primary | ICD-10-CM

## 2025-02-23 DIAGNOSIS — I10 BENIGN ESSENTIAL HYPERTENSION: ICD-10-CM

## 2025-02-24 RX ORDER — FERROUS SULFATE 325(65) MG
325 TABLET ORAL
Qty: 90 TABLET | Refills: 3 | Status: SHIPPED | OUTPATIENT
Start: 2025-02-24

## 2025-02-24 RX ORDER — LOSARTAN POTASSIUM 50 MG/1
50 TABLET ORAL DAILY
Qty: 90 TABLET | Refills: 3 | OUTPATIENT
Start: 2025-02-24

## 2025-02-24 NOTE — TELEPHONE ENCOUNTER
Dr. Lucero Lazaro   Patient is taking OTC iron   Wondering if you would send to see if covered by insurance     Incoming call from patient     Patient has had low hgb in the past and has been purchasing OTC iron, wondering if pcp would send to see if insurance will cover     HGB 10.5 g/dl - 1/5/2025     Norah Rae, Registered Nurse  [facility]

## 2025-02-24 NOTE — TELEPHONE ENCOUNTER
Attempt x 1. Called pt and left a message to call back to [phone] and to ask to speak to a nurse.     When pt calls back,     Clarify who is prescribing the medication request below.     Belia MO RN   Clinic RN  [facility]

## 2025-03-18 ENCOUNTER — HOSPITAL ENCOUNTER (OUTPATIENT)
Dept: MAMMOGRAPHY | Facility: CLINIC | Age: 50
Discharge: HOME | End: 2025-03-18
Attending: FAMILY MEDICINE | Admitting: FAMILY MEDICINE
Payer: COMMERCIAL

## 2025-03-18 DIAGNOSIS — Z12.31 VISIT FOR SCREENING MAMMOGRAM: ICD-10-CM

## 2025-03-18 PROCEDURE — 77063 BREAST TOMOSYNTHESIS BI: CPT

## 2025-04-17 ENCOUNTER — PATIENT OUTREACH (OUTPATIENT)
Dept: CARE COORDINATION | Facility: CLINIC | Age: 50
End: 2025-04-17
Payer: COMMERCIAL

## 2025-05-01 ENCOUNTER — PATIENT OUTREACH (OUTPATIENT)
Dept: CARE COORDINATION | Facility: CLINIC | Age: 50
End: 2025-05-01
Payer: COMMERCIAL

## 2025-06-23 ENCOUNTER — TELEPHONE (OUTPATIENT)
Dept: OBGYN | Facility: CLINIC | Age: 50
End: 2025-06-23
Payer: COMMERCIAL

## 2025-06-23 NOTE — TELEPHONE ENCOUNTER
Form received from: Patient     Form requesting following info/need: FMLA     TATIANA needed?: No     Location of form: Above Marlee's desk     When completed the route for return: Call patient to

## 2025-06-28 ENCOUNTER — HEALTH MAINTENANCE LETTER (OUTPATIENT)
Age: 50
End: 2025-06-28

## 2025-07-08 ENCOUNTER — OFFICE VISIT (OUTPATIENT)
Dept: FAMILY MEDICINE | Facility: CLINIC | Age: 50
End: 2025-07-08
Payer: COMMERCIAL

## 2025-07-08 VITALS
BODY MASS INDEX: 24.59 KG/M2 | HEIGHT: 64 IN | HEART RATE: 71 BPM | SYSTOLIC BLOOD PRESSURE: 139 MMHG | DIASTOLIC BLOOD PRESSURE: 89 MMHG | TEMPERATURE: 97.9 F | RESPIRATION RATE: 14 BRPM | OXYGEN SATURATION: 100 % | WEIGHT: 144 LBS

## 2025-07-08 DIAGNOSIS — N83.202 CYST OF LEFT OVARY: ICD-10-CM

## 2025-07-08 DIAGNOSIS — N93.9 ABNORMAL UTERINE BLEEDING: ICD-10-CM

## 2025-07-08 DIAGNOSIS — N94.6 DYSMENORRHEA: ICD-10-CM

## 2025-07-08 DIAGNOSIS — Z01.818 PREOPERATIVE EXAMINATION: Primary | ICD-10-CM

## 2025-07-08 DIAGNOSIS — D25.9 UTERINE LEIOMYOMA, UNSPECIFIED LOCATION: ICD-10-CM

## 2025-07-08 PROBLEM — R10.9 ABDOMINAL PAIN, UNSPECIFIED ABDOMINAL LOCATION: Status: RESOLVED | Noted: 2025-01-04 | Resolved: 2025-07-08

## 2025-07-08 PROBLEM — R19.7 DIARRHEA DUE TO MALABSORPTION: Status: RESOLVED | Noted: 2025-02-08 | Resolved: 2025-07-08

## 2025-07-08 PROBLEM — K90.9 DIARRHEA DUE TO MALABSORPTION: Status: RESOLVED | Noted: 2025-02-08 | Resolved: 2025-07-08

## 2025-07-08 PROBLEM — K85.90 ACUTE PANCREATITIS, UNSPECIFIED COMPLICATION STATUS, UNSPECIFIED PANCREATITIS TYPE: Status: RESOLVED | Noted: 2025-01-04 | Resolved: 2025-07-08

## 2025-07-08 LAB
HGB BLD-MCNC: 11.9 G/DL (ref 11.7–15.7)
MCV RBC AUTO: 92 FL (ref 78–100)

## 2025-07-08 PROCEDURE — 99214 OFFICE O/P EST MOD 30 MIN: CPT | Performed by: FAMILY MEDICINE

## 2025-07-08 PROCEDURE — 80048 BASIC METABOLIC PNL TOTAL CA: CPT | Performed by: FAMILY MEDICINE

## 2025-07-08 PROCEDURE — 85018 HEMOGLOBIN: CPT | Performed by: FAMILY MEDICINE

## 2025-07-08 PROCEDURE — 36415 COLL VENOUS BLD VENIPUNCTURE: CPT | Performed by: FAMILY MEDICINE

## 2025-07-08 PROCEDURE — 3079F DIAST BP 80-89 MM HG: CPT | Performed by: FAMILY MEDICINE

## 2025-07-08 PROCEDURE — 3075F SYST BP GE 130 - 139MM HG: CPT | Performed by: FAMILY MEDICINE

## 2025-07-08 PROCEDURE — G2211 COMPLEX E/M VISIT ADD ON: HCPCS | Performed by: FAMILY MEDICINE

## 2025-07-08 NOTE — PROGRESS NOTES
Preoperative Evaluation  [facility]  [address] [facility] [address] [address]  Phone: [phone]  Primary Provider: Sherry Lazaro MD  Pre-op Performing Provider: Sherry Lazaro MD  Jul 8, 2025 7/7/2025   Surgical Information   What procedure is being done? Robot Assisted total Laparoscopic Hysterectomy, bilateral Salpingectomy,left Ovarian Cystectomy   Facility or Hospital where procedure/surgery will be performed: [facility]   Who is doing the procedure / surgery? Dr. Ce Rodríguez   Date of surgery / procedure: July 23, 2025   Time of surgery / procedure: 10:30am   Where do you plan to recover after surgery? at home with family     Fax number for surgical facility: Note does not need to be faxed, will be available electronically in Epic.    Assessment & Plan     The proposed surgical procedure is considered INTERMEDIATE risk.    Preoperative examination  Uterine leiomyoma, unspecified location  Cyst of left ovary  Dysmenorrhea  Abnormal uterine bleeding  No recommendations for optimization prior to procedure.  Okay to proceed.  - Hemoglobin; Future  - Basic metabolic panel  (Ca, Cl, CO2, Creat, Gluc, K, Na, BUN); Future  - Hemoglobin  - Basic metabolic panel  (Ca, Cl, CO2, Creat, Gluc, K, Na, BUN)        - No identified additional risk factors other than previously addressed    Antiplatelet or Anticoagulation Medication Instructions   - We reviewed the medication list and the patient is not on an antiplatelet or anticoagulation medications.    Additional Medication Instructions  Take all scheduled medications on the day of surgery EXCEPT for modifications listed below:   - Herbal medications and vitamins: DO NOT TAKE 14 days prior to surgery.   - ACE/ARB/ARNI (lisinopril, enalapril, losartan, valsartan, olmesartan, sacubritril/valsartan) : DO NOT TAKE on day of surgery (minimum 11 hours for general  anesthesia).    Recommendation  Approval given to proceed with proposed procedure, without further diagnostic evaluation.    The longitudinal plan of care for the diagnosis(es)/condition(s) as documented were addressed during this visit. Due to the added complexity in care, I will continue to support Piyush in the subsequent management and with ongoing continuity of care.    Mark Pickett is a 49 year old, presenting for the following:  Pre-Op Exam          7/8/2025    10:37 AM   Additional Questions   Roomed by Julianna MARRUFO: Uterine fibroids, left ovarian cyst, dysmenorrhea with abnormal uterine bleeding.          7/7/2025   Pre-Op Questionnaire   Have you ever had a heart attack or stroke? No   Have you ever had surgery on your heart or blood vessels, such as a stent placement, a coronary artery bypass, or surgery on an artery in your head, neck, heart, or legs? No   Do you have chest pain with activity? No   Do you have a history of heart failure? No   Do you currently have a cold, bronchitis or symptoms of other infection? No   Do you have a cough, shortness of breath, or wheezing? No   Do you or anyone in your family have previous history of blood clots? No   Do you or does anyone in your family have a serious bleeding problem such as prolonged bleeding following surgeries or cuts? No   Have you ever had problems with anemia or been told to take iron pills? No   Have you had any abnormal blood loss such as black, tarry or bloody stools, or abnormal vaginal bleeding? No   Have you ever had a blood transfusion? No   Are you willing to have a blood transfusion if it is medically needed before, during, or after your surgery? Yes   Have you or any of your relatives ever had problems with anesthesia? No   Do you have sleep apnea, excessive snoring or daytime drowsiness? No   Do you have any artifical heart valves or other implanted medical devices like a pacemaker, defibrillator, or continuous glucose  monitor? No   Do you have artificial joints? No   Are you allergic to latex? No     Advance Care Planning    Discussed advance care planning with patient; informed AVS has link to [employer].    Preoperative Review of    reviewed - no record of controlled substances prescribed.      Status of Chronic Conditions:  See problem list for active medical problems.  Problems all longstanding and stable, except as noted/documented.  See ROS for pertinent symptoms related to these conditions.    HYPERTENSION - Patient has longstanding history of HTN , currently denies any symptoms referable to elevated blood pressure. Specifically denies chest pain, palpitations, dyspnea, orthopnea, PND or peripheral edema. Blood pressure readings have been in normal range. Current medication regimen is as listed below. Patient denies any side effects of medication.     Patient Active Problem List    Diagnosis Date Noted    S/P cholecystectomy 02/08/2025     Priority: Medium    Fibroadenoma of breast, right 05/17/2024     Priority: Medium    Endometriosis 11/07/2022     Priority: Medium    Menorrhagia 11/07/2022     Priority: Medium    Cyst of left ovary 07/31/2019     Priority: Medium    Family history of breast cancer in first degree relative 07/31/2019     Priority: Medium     Has 2 sisters with breast cancer.  Based on the personal and family history information she provided, Piyush has an estimated 30.8% lifetime risk of developing breast cancer based on the CHRISTAL Risk Evaluator v8 model. As such, Piyush meets current National Comprehensive Cancer Network (NCCN) guidelines for high risk breast screening. This includes annual breast MRI in addition to annual mammogram (alternating every 6 months). In addition, Piyush should be receiving clinical breast exams by her physician.      Benign essential hypertension 07/31/2019     Priority: Medium      Past Medical History:   Diagnosis Date    Acute pancreatitis, unspecified  complication status, unspecified pancreatitis type 2025    Hypertension 2019     Past Surgical History:   Procedure Laterality Date    ABDOMEN SURGERY  &    Removal of left ovary cyst; cholecystectomy    AS REMOVAL OF OVARIAN CYST(S) Left 2016    BIOPSY      Right Breast Biopsy    GYN SURGERY  2016    LAPAROSCOPIC CHOLECYSTECTOMY WITH CHOLANGIOGRAMS N/A 2025    Procedure: Laparoscopic cholecystectomy with cholangiopgram;  Surgeon: Yruy Oswald MD;  Location: [facility] OR     Current Outpatient Medications   Medication Sig Dispense Refill    Ascorbic Acid (VITAMIN C PO) Take 1 tablet by mouth daily.      cholecalciferol (VITAMIN D3) 25 mcg (1000 units) capsule Take 1 capsule by mouth daily.      ferrous sulfate (FEROSUL) 325 (65 Fe) MG tablet Take 1 tablet (325 mg) by mouth daily (with breakfast). 90 tablet 3    losartan (COZAAR) 50 MG tablet Take 1 tablet (50 mg) by mouth daily. 90 tablet 3    Multiple Vitamin (MULTIVITAMIN ADULT PO)          No Known Allergies     Social History     Tobacco Use    Smoking status: Never    Smokeless tobacco: Never   Substance Use Topics    Alcohol use: Never     Family History   Problem Relation Age of Onset    Hypertension Mother     Cerebrovascular Disease Mother         Mother (hemorrhagic stroke at 56yrs old)    Diabetes Father     Hypertension Father     Hyperlipidemia Father     Breast Cancer Sister 46        Eldest (breast CA survivor); 2nd sister ( from breast CA)    Breast Cancer Sister     Hypertension Brother     Hypertension Brother     Colon Cancer No family hx of      History   Drug Use Unknown         Review of Systems  CONSTITUTIONAL: NEGATIVE for fever, chills, change in weight  INTEGUMENTARY/SKIN: NEGATIVE for worrisome rashes, moles or lesions  EYES: NEGATIVE for vision changes or irritation  ENT/MOUTH: NEGATIVE for ear, mouth and throat problems  RESP: NEGATIVE for significant cough or SOB  BREAST: NEGATIVE for masses, tenderness  CV:  "NEGATIVE for chest pain, palpitations or peripheral edema  GI: NEGATIVE for nausea, abdominal pain, heartburn, or change in bowel habits  : NEGATIVE for frequency, dysuria, or hematuria  MUSCULOSKELETAL: NEGATIVE for significant arthralgias or myalgia  NEURO: NEGATIVE for weakness, dizziness or paresthesias  HEME: NEGATIVE for bleeding problems  PSYCHIATRIC: NEGATIVE for changes in mood or affect    Objective    /89 (BP Location: Right arm, Patient Position: Sitting, Cuff Size: Adult Regular)   Pulse 71   Temp 97.9  F (36.6  C) (Tympanic)   Resp 14   Ht 1.626 m (5' 4\")   Wt 65.3 kg (144 lb)   SpO2 100%   BMI 24.72 kg/m     Estimated body mass index is 24.72 kg/m  as calculated from the following:    Height as of this encounter: 1.626 m (5' 4\").    Weight as of this encounter: 65.3 kg (144 lb).  Physical Exam  GENERAL: alert and no distress  EYES: Eyes grossly normal to inspection, PERRL and conjunctivae and sclerae normal  HENT: ear canals and TM's normal, nose and mouth without ulcers or lesions  NECK: no adenopathy, no asymmetry, masses, or scars  RESP: lungs clear to auscultation - no rales, rhonchi or wheezes  CV: regular rates and rhythm, normal S1 S2, no S3 or S4, no murmur, click or rub, and no peripheral edema  ABDOMEN: bowel sounds normal  MS: no gross musculoskeletal defects noted, no edema  SKIN: no suspicious lesions or rashes  NEURO: Normal strength and tone, mentation intact and speech normal  PSYCH: mentation appears normal, affect normal/bright    Recent Labs   Lab Test 01/27/25  1747 01/05/25  0633 01/04/25  0838 01/03/25  2311   HGB 11.7 10.5*   < > 11.9    284   < > 333    139   < > 138   POTASSIUM 3.6 3.6   < > 4.1   CR 0.48* 0.54   < > 0.53   A1C  --   --   --  5.7*    < > = values in this interval not displayed.        Diagnostics  Labs pending at this time.  Results will be reviewed when available.   No EKG required, no history of coronary heart disease, significant " arrhythmia, peripheral arterial disease or other structural heart disease.    Revised Cardiac Risk Index (RCRI)  The patient has the following serious cardiovascular risks for perioperative complications:   - No serious cardiac risks = 0 points     RCRI Interpretation: 0 points: Class I (very low risk - 0.4% complication rate)     Signed Electronically by: Sherry Lazaro MD  A copy of this evaluation report is provided to the requesting physician.

## 2025-07-08 NOTE — PATIENT INSTRUCTIONS
How to Take Your Medication Before Surgery  Preoperative Medication Instructions   Antiplatelet or Anticoagulation Medication Instructions   - We reviewed the medication list and the patient is not on an antiplatelet or anticoagulation medications.    Additional Medication Instructions  Take all scheduled medications on the day of surgery EXCEPT for modifications listed below:   - Herbal medications and vitamins: DO NOT TAKE 14 days prior to surgery.   - ACE/ARB/ARNI (lisinopril, enalapril, losartan, valsartan, olmesartan, sacubritril/valsartan) : DO NOT TAKE on day of surgery (minimum 11 hours for general anesthesia).       
The resident's documentation has been prepared under my direction and personally reviewed by me in its entirety. I confirm that the note above accurately reflects all work, treatment, procedures, and medical decision making performed by me.  Nahomi Zuniga MD

## 2025-07-09 LAB
ANION GAP SERPL CALCULATED.3IONS-SCNC: 11 MMOL/L (ref 7–15)
BUN SERPL-MCNC: 16.6 MG/DL (ref 6–20)
CALCIUM SERPL-MCNC: 9.1 MG/DL (ref 8.8–10.4)
CHLORIDE SERPL-SCNC: 102 MMOL/L (ref 98–107)
CREAT SERPL-MCNC: 0.52 MG/DL (ref 0.51–0.95)
EGFRCR SERPLBLD CKD-EPI 2021: >90 ML/MIN/1.73M2
GLUCOSE SERPL-MCNC: 83 MG/DL (ref 70–99)
HCO3 SERPL-SCNC: 25 MMOL/L (ref 22–29)
POTASSIUM SERPL-SCNC: 3.7 MMOL/L (ref 3.4–5.3)
SODIUM SERPL-SCNC: 138 MMOL/L (ref 135–145)

## 2025-07-23 ENCOUNTER — ANESTHESIA (OUTPATIENT)
Dept: SURGERY | Facility: CLINIC | Age: 50
End: 2025-07-23
Payer: COMMERCIAL

## 2025-07-23 ENCOUNTER — ANESTHESIA EVENT (OUTPATIENT)
Dept: SURGERY | Facility: CLINIC | Age: 50
End: 2025-07-23
Payer: COMMERCIAL

## 2025-07-23 ENCOUNTER — HOSPITAL ENCOUNTER (OUTPATIENT)
Facility: CLINIC | Age: 50
Discharge: HOME | End: 2025-07-23
Attending: OBSTETRICS & GYNECOLOGY | Admitting: OBSTETRICS & GYNECOLOGY
Payer: COMMERCIAL

## 2025-07-23 VITALS
WEIGHT: 143.9 LBS | BODY MASS INDEX: 24.7 KG/M2 | SYSTOLIC BLOOD PRESSURE: 140 MMHG | TEMPERATURE: 97.9 F | OXYGEN SATURATION: 100 % | HEART RATE: 73 BPM | DIASTOLIC BLOOD PRESSURE: 84 MMHG | RESPIRATION RATE: 15 BRPM

## 2025-07-23 DIAGNOSIS — Z90.49 S/P CHOLECYSTECTOMY: Primary | ICD-10-CM

## 2025-07-23 DIAGNOSIS — Z90.710 S/P HYSTERECTOMY: ICD-10-CM

## 2025-07-23 DIAGNOSIS — N83.202 CYST OF LEFT OVARY: ICD-10-CM

## 2025-07-23 LAB — HCG UR QL: NEGATIVE

## 2025-07-23 PROCEDURE — 250N000025 HC SEVOFLURANE, PER MIN: Performed by: OBSTETRICS & GYNECOLOGY

## 2025-07-23 PROCEDURE — 250N000009 HC RX 250: Performed by: ANESTHESIOLOGY

## 2025-07-23 PROCEDURE — 250N000011 HC RX IP 250 OP 636: Performed by: ANESTHESIOLOGY

## 2025-07-23 PROCEDURE — 258N000001 HC RX 258: Performed by: OBSTETRICS & GYNECOLOGY

## 2025-07-23 PROCEDURE — 272N000001 HC OR GENERAL SUPPLY STERILE: Performed by: OBSTETRICS & GYNECOLOGY

## 2025-07-23 PROCEDURE — 250N000011 HC RX IP 250 OP 636: Performed by: OBSTETRICS & GYNECOLOGY

## 2025-07-23 PROCEDURE — 88307 TISSUE EXAM BY PATHOLOGIST: CPT | Mod: TC | Performed by: OBSTETRICS & GYNECOLOGY

## 2025-07-23 PROCEDURE — 88307 TISSUE EXAM BY PATHOLOGIST: CPT | Mod: 26 | Performed by: PATHOLOGY

## 2025-07-23 PROCEDURE — 258N000003 HC RX IP 258 OP 636: Performed by: ANESTHESIOLOGY

## 2025-07-23 PROCEDURE — S2900 ROBOTIC SURGICAL SYSTEM: HCPCS | Performed by: OBSTETRICS & GYNECOLOGY

## 2025-07-23 PROCEDURE — 250N000009 HC RX 250: Performed by: OBSTETRICS & GYNECOLOGY

## 2025-07-23 PROCEDURE — 370N000017 HC ANESTHESIA TECHNICAL FEE, PER MIN: Performed by: OBSTETRICS & GYNECOLOGY

## 2025-07-23 PROCEDURE — 58573 TLH W/T/O UTERUS OVER 250 G: CPT | Mod: 22 | Performed by: OBSTETRICS & GYNECOLOGY

## 2025-07-23 PROCEDURE — 81025 URINE PREGNANCY TEST: CPT | Performed by: OBSTETRICS & GYNECOLOGY

## 2025-07-23 PROCEDURE — 710N000012 HC RECOVERY PHASE 2, PER MINUTE: Performed by: OBSTETRICS & GYNECOLOGY

## 2025-07-23 PROCEDURE — 999N000141 HC STATISTIC PRE-PROCEDURE NURSING ASSESSMENT: Performed by: OBSTETRICS & GYNECOLOGY

## 2025-07-23 PROCEDURE — 250N000013 HC RX MED GY IP 250 OP 250 PS 637: Performed by: OBSTETRICS & GYNECOLOGY

## 2025-07-23 PROCEDURE — 710N000009 HC RECOVERY PHASE 1, LEVEL 1, PER MIN: Performed by: OBSTETRICS & GYNECOLOGY

## 2025-07-23 PROCEDURE — 58662 LAPAROSCOPY EXCISE LESIONS: CPT | Mod: 51 | Performed by: OBSTETRICS & GYNECOLOGY

## 2025-07-23 PROCEDURE — 360N000080 HC SURGERY LEVEL 7, PER MIN: Performed by: OBSTETRICS & GYNECOLOGY

## 2025-07-23 RX ORDER — ALBUTEROL SULFATE 0.83 MG/ML
2.5 SOLUTION RESPIRATORY (INHALATION) EVERY 4 HOURS PRN
Status: DISCONTINUED | OUTPATIENT
Start: 2025-07-23 | End: 2025-07-23 | Stop reason: HOSPADM

## 2025-07-23 RX ORDER — PROPOFOL 10 MG/ML
INJECTION, EMULSION INTRAVENOUS PRN
Status: DISCONTINUED | OUTPATIENT
Start: 2025-07-23 | End: 2025-07-23

## 2025-07-23 RX ORDER — METHADONE HYDROCHLORIDE 10 MG/ML
INJECTION, SOLUTION INTRAMUSCULAR; INTRAVENOUS; SUBCUTANEOUS PRN
Status: DISCONTINUED | OUTPATIENT
Start: 2025-07-23 | End: 2025-07-23

## 2025-07-23 RX ORDER — NALOXONE HYDROCHLORIDE 0.4 MG/ML
0.1 INJECTION, SOLUTION INTRAMUSCULAR; INTRAVENOUS; SUBCUTANEOUS
Status: DISCONTINUED | OUTPATIENT
Start: 2025-07-23 | End: 2025-07-23 | Stop reason: HOSPADM

## 2025-07-23 RX ORDER — CEFAZOLIN SODIUM/WATER 2 G/20 ML
2 SYRINGE (ML) INTRAVENOUS SEE ADMIN INSTRUCTIONS
Status: DISCONTINUED | OUTPATIENT
Start: 2025-07-23 | End: 2025-07-23 | Stop reason: HOSPADM

## 2025-07-23 RX ORDER — DEXAMETHASONE SODIUM PHOSPHATE 4 MG/ML
INJECTION, SOLUTION INTRA-ARTICULAR; INTRALESIONAL; INTRAMUSCULAR; INTRAVENOUS; SOFT TISSUE PRN
Status: DISCONTINUED | OUTPATIENT
Start: 2025-07-23 | End: 2025-07-23

## 2025-07-23 RX ORDER — LIDOCAINE HYDROCHLORIDE 20 MG/ML
INJECTION, SOLUTION INFILTRATION; PERINEURAL PRN
Status: DISCONTINUED | OUTPATIENT
Start: 2025-07-23 | End: 2025-07-23

## 2025-07-23 RX ORDER — IBUPROFEN 800 MG/1
800 TABLET, FILM COATED ORAL ONCE
Status: DISCONTINUED | OUTPATIENT
Start: 2025-07-23 | End: 2025-07-23 | Stop reason: HOSPADM

## 2025-07-23 RX ORDER — ONDANSETRON 4 MG/1
4 TABLET, ORALLY DISINTEGRATING ORAL EVERY 30 MIN PRN
Status: DISCONTINUED | OUTPATIENT
Start: 2025-07-23 | End: 2025-07-23 | Stop reason: HOSPADM

## 2025-07-23 RX ORDER — SODIUM CHLORIDE, SODIUM LACTATE, POTASSIUM CHLORIDE, CALCIUM CHLORIDE 600; 310; 30; 20 MG/100ML; MG/100ML; MG/100ML; MG/100ML
INJECTION, SOLUTION INTRAVENOUS CONTINUOUS
Status: DISCONTINUED | OUTPATIENT
Start: 2025-07-23 | End: 2025-07-23 | Stop reason: HOSPADM

## 2025-07-23 RX ORDER — LABETALOL HYDROCHLORIDE 5 MG/ML
10 INJECTION, SOLUTION INTRAVENOUS
Status: DISCONTINUED | OUTPATIENT
Start: 2025-07-23 | End: 2025-07-23 | Stop reason: HOSPADM

## 2025-07-23 RX ORDER — HYDROCODONE BITARTRATE AND ACETAMINOPHEN 5; 325 MG/1; MG/1
1-2 TABLET ORAL EVERY 4 HOURS PRN
Qty: 10 TABLET | Refills: 0 | Status: SHIPPED | OUTPATIENT
Start: 2025-07-23

## 2025-07-23 RX ORDER — APREPITANT 40 MG/1
40 CAPSULE ORAL ONCE
Status: COMPLETED | OUTPATIENT
Start: 2025-07-23 | End: 2025-07-23

## 2025-07-23 RX ORDER — AMOXICILLIN 250 MG
1-2 CAPSULE ORAL 2 TIMES DAILY PRN
Qty: 30 TABLET | Refills: 0 | Status: SHIPPED | OUTPATIENT
Start: 2025-07-23

## 2025-07-23 RX ORDER — PHENAZOPYRIDINE HYDROCHLORIDE 200 MG/1
200 TABLET, FILM COATED ORAL ONCE
Status: COMPLETED | OUTPATIENT
Start: 2025-07-23 | End: 2025-07-23

## 2025-07-23 RX ORDER — ONDANSETRON 2 MG/ML
INJECTION INTRAMUSCULAR; INTRAVENOUS PRN
Status: DISCONTINUED | OUTPATIENT
Start: 2025-07-23 | End: 2025-07-23

## 2025-07-23 RX ORDER — ACETAMINOPHEN 325 MG/1
975 TABLET ORAL ONCE
Status: DISCONTINUED | OUTPATIENT
Start: 2025-07-23 | End: 2025-07-23 | Stop reason: HOSPADM

## 2025-07-23 RX ORDER — GLYCOPYRROLATE 0.2 MG/ML
INJECTION, SOLUTION INTRAMUSCULAR; INTRAVENOUS PRN
Status: DISCONTINUED | OUTPATIENT
Start: 2025-07-23 | End: 2025-07-23

## 2025-07-23 RX ORDER — METHADONE HYDROCHLORIDE 10 MG/ML
2 INJECTION, SOLUTION INTRAMUSCULAR; INTRAVENOUS; SUBCUTANEOUS 3 TIMES DAILY PRN
Status: DISCONTINUED | OUTPATIENT
Start: 2025-07-23 | End: 2025-07-23 | Stop reason: HOSPADM

## 2025-07-23 RX ORDER — METRONIDAZOLE 500 MG/100ML
500 INJECTION, SOLUTION INTRAVENOUS
Status: COMPLETED | OUTPATIENT
Start: 2025-07-23 | End: 2025-07-23

## 2025-07-23 RX ORDER — ONDANSETRON 2 MG/ML
4 INJECTION INTRAMUSCULAR; INTRAVENOUS
Status: COMPLETED | OUTPATIENT
Start: 2025-07-23 | End: 2025-07-23

## 2025-07-23 RX ORDER — DEXAMETHASONE SODIUM PHOSPHATE 4 MG/ML
4 INJECTION, SOLUTION INTRA-ARTICULAR; INTRALESIONAL; INTRAMUSCULAR; INTRAVENOUS; SOFT TISSUE
Status: DISCONTINUED | OUTPATIENT
Start: 2025-07-23 | End: 2025-07-23 | Stop reason: HOSPADM

## 2025-07-23 RX ORDER — BUPIVACAINE HCL/EPINEPHRINE 0.5-1:200K
VIAL (ML) INJECTION PRN
Status: DISCONTINUED | OUTPATIENT
Start: 2025-07-23 | End: 2025-07-23 | Stop reason: HOSPADM

## 2025-07-23 RX ORDER — MAGNESIUM SULFATE HEPTAHYDRATE 40 MG/ML
2 INJECTION, SOLUTION INTRAVENOUS
Status: DISCONTINUED | OUTPATIENT
Start: 2025-07-23 | End: 2025-07-23 | Stop reason: HOSPADM

## 2025-07-23 RX ORDER — ONDANSETRON 2 MG/ML
4 INJECTION INTRAMUSCULAR; INTRAVENOUS EVERY 30 MIN PRN
Status: DISCONTINUED | OUTPATIENT
Start: 2025-07-23 | End: 2025-07-23 | Stop reason: HOSPADM

## 2025-07-23 RX ORDER — FUROSEMIDE 10 MG/ML
INJECTION INTRAMUSCULAR; INTRAVENOUS PRN
Status: DISCONTINUED | OUTPATIENT
Start: 2025-07-23 | End: 2025-07-23

## 2025-07-23 RX ORDER — ONDANSETRON 4 MG/1
4 TABLET, ORALLY DISINTEGRATING ORAL EVERY 8 HOURS PRN
Qty: 4 TABLET | Refills: 0 | Status: SHIPPED | OUTPATIENT
Start: 2025-07-23

## 2025-07-23 RX ORDER — IBUPROFEN 800 MG/1
800 TABLET, FILM COATED ORAL EVERY 6 HOURS PRN
Qty: 30 TABLET | Refills: 0 | Status: SHIPPED | OUTPATIENT
Start: 2025-07-23

## 2025-07-23 RX ORDER — KETOROLAC TROMETHAMINE 15 MG/ML
15 INJECTION, SOLUTION INTRAMUSCULAR; INTRAVENOUS
Status: DISCONTINUED | OUTPATIENT
Start: 2025-07-23 | End: 2025-07-23 | Stop reason: HOSPADM

## 2025-07-23 RX ORDER — LIDOCAINE 40 MG/G
CREAM TOPICAL
Status: DISCONTINUED | OUTPATIENT
Start: 2025-07-23 | End: 2025-07-23 | Stop reason: HOSPADM

## 2025-07-23 RX ORDER — HYDRALAZINE HYDROCHLORIDE 20 MG/ML
10 INJECTION INTRAMUSCULAR; INTRAVENOUS EVERY 10 MIN PRN
Status: DISCONTINUED | OUTPATIENT
Start: 2025-07-23 | End: 2025-07-23 | Stop reason: HOSPADM

## 2025-07-23 RX ORDER — SCOPOLAMINE 1 MG/3D
1 PATCH, EXTENDED RELEASE TRANSDERMAL ONCE
Status: DISCONTINUED | OUTPATIENT
Start: 2025-07-23 | End: 2025-07-23 | Stop reason: HOSPADM

## 2025-07-23 RX ORDER — KETOROLAC TROMETHAMINE 30 MG/ML
INJECTION, SOLUTION INTRAMUSCULAR; INTRAVENOUS PRN
Status: DISCONTINUED | OUTPATIENT
Start: 2025-07-23 | End: 2025-07-23

## 2025-07-23 RX ORDER — ACETAMINOPHEN 325 MG/1
975 TABLET ORAL ONCE
Status: COMPLETED | OUTPATIENT
Start: 2025-07-23 | End: 2025-07-23

## 2025-07-23 RX ORDER — CEFAZOLIN SODIUM/WATER 2 G/20 ML
2 SYRINGE (ML) INTRAVENOUS
Status: DISCONTINUED | OUTPATIENT
Start: 2025-07-23 | End: 2025-07-23 | Stop reason: HOSPADM

## 2025-07-23 RX ADMIN — DEXAMETHASONE SODIUM PHOSPHATE 8 MG: 4 INJECTION, SOLUTION INTRA-ARTICULAR; INTRALESIONAL; INTRAMUSCULAR; INTRAVENOUS; SOFT TISSUE at 10:20

## 2025-07-23 RX ADMIN — PHENAZOPYRIDINE 200 MG: 200 TABLET ORAL at 09:03

## 2025-07-23 RX ADMIN — DEXMEDETOMIDINE HYDROCHLORIDE 0.5 MCG/KG/HR: 100 INJECTION, SOLUTION INTRAVENOUS at 10:20

## 2025-07-23 RX ADMIN — ROCURONIUM BROMIDE 20 MG: 50 INJECTION, SOLUTION INTRAVENOUS at 11:50

## 2025-07-23 RX ADMIN — KETOROLAC TROMETHAMINE 15 MG: 30 INJECTION, SOLUTION INTRAMUSCULAR at 10:20

## 2025-07-23 RX ADMIN — ONDANSETRON 4 MG: 2 INJECTION INTRAMUSCULAR; INTRAVENOUS at 10:20

## 2025-07-23 RX ADMIN — PROPOFOL 120 MG: 10 INJECTION, EMULSION INTRAVENOUS at 10:20

## 2025-07-23 RX ADMIN — ROCURONIUM BROMIDE 30 MG: 50 INJECTION, SOLUTION INTRAVENOUS at 11:23

## 2025-07-23 RX ADMIN — METRONIDAZOLE 500 MG: 500 INJECTION, SOLUTION INTRAVENOUS at 09:33

## 2025-07-23 RX ADMIN — FUROSEMIDE 5 MG: 10 INJECTION, SOLUTION INTRAVENOUS at 12:37

## 2025-07-23 RX ADMIN — ROCURONIUM BROMIDE 50 MG: 50 INJECTION, SOLUTION INTRAVENOUS at 10:20

## 2025-07-23 RX ADMIN — ONDANSETRON 4 MG: 2 INJECTION, SOLUTION INTRAMUSCULAR; INTRAVENOUS at 17:05

## 2025-07-23 RX ADMIN — METHADONE HYDROCHLORIDE 15 MG: 10 INJECTION, SOLUTION INTRAMUSCULAR; INTRAVENOUS; SUBCUTANEOUS at 10:20

## 2025-07-23 RX ADMIN — ONDANSETRON 4 MG: 2 INJECTION, SOLUTION INTRAMUSCULAR; INTRAVENOUS at 15:12

## 2025-07-23 RX ADMIN — GLYCOPYRROLATE 0.2 MG: 0.2 INJECTION, SOLUTION INTRAMUSCULAR; INTRAVENOUS at 10:58

## 2025-07-23 RX ADMIN — SUGAMMADEX 200 MG: 100 INJECTION, SOLUTION INTRAVENOUS at 12:43

## 2025-07-23 RX ADMIN — TRANEXAMIC ACID 1 G: 1 INJECTION, SOLUTION INTRAVENOUS at 12:37

## 2025-07-23 RX ADMIN — SODIUM CHLORIDE, SODIUM LACTATE, POTASSIUM CHLORIDE, AND CALCIUM CHLORIDE: .6; .31; .03; .02 INJECTION, SOLUTION INTRAVENOUS at 09:36

## 2025-07-23 RX ADMIN — LIDOCAINE HYDROCHLORIDE 50 MG: 20 INJECTION, SOLUTION INFILTRATION; PERINEURAL at 10:20

## 2025-07-23 RX ADMIN — SODIUM CHLORIDE, SODIUM LACTATE, POTASSIUM CHLORIDE, AND CALCIUM CHLORIDE: .6; .31; .03; .02 INJECTION, SOLUTION INTRAVENOUS at 11:58

## 2025-07-23 RX ADMIN — ACETAMINOPHEN 975 MG: 325 TABLET ORAL at 09:03

## 2025-07-23 RX ADMIN — APREPITANT 40 MG: 40 CAPSULE ORAL at 17:25

## 2025-07-23 RX ADMIN — MIDAZOLAM 2 MG: 1 INJECTION INTRAMUSCULAR; INTRAVENOUS at 10:13

## 2025-07-23 ASSESSMENT — ACTIVITIES OF DAILY LIVING (ADL)
ADLS_ACUITY_SCORE: 25

## 2025-07-23 NOTE — ANESTHESIA CARE TRANSFER NOTE
Patient: Piyush Feldman    Procedure: Procedure(s):  Robot assisted Total Laparoscopic Hysterectomy, Bilateral Salpingectomy,left ovarian cystectomy       Diagnosis: Uterine leiomyoma, unspecified location [D25.9]  Left ovarian cyst [N83.202]  Dysmenorrhea [N94.6]  Abnormal uterine bleeding (AUB) [N93.9]  Diagnosis Additional Information: No value filed.    Anesthesia Type:   General     Note:    Oropharynx: oropharynx clear of all foreign objects and spontaneously breathing  Level of Consciousness: drowsy  Oxygen Supplementation: face mask  Level of Supplemental Oxygen (L/min / FiO2): 6  Independent Airway: airway patency satisfactory and stable  Dentition: dentition unchanged  Vital Signs Stable: post-procedure vital signs reviewed and stable  Report to RN Given: handoff report given  Patient transferred to: PACU    Handoff Report: Identifed the Patient, Identified the Reponsible Provider, Reviewed the pertinent medical history, Discussed the surgical course, Reviewed Intra-OP anesthesia mangement and issues during anesthesia, Set expectations for post-procedure period and Allowed opportunity for questions and acknowledgement of understanding  Vitals:  Vitals Value Taken Time   /66 07/23/25 13:00   Temp 96.8  F (36  C) 07/23/25 13:03   Pulse 58 07/23/25 13:04   Resp 13 07/23/25 13:04   SpO2 100 % 07/23/25 13:04   Vitals shown include unfiled device data.    Electronically Signed By: ROBEL Stephen CRNA  July 23, 2025  1:05 PM

## 2025-07-23 NOTE — ANESTHESIA PREPROCEDURE EVALUATION
Anesthesia Pre-Procedure Evaluation    Patient: Piyush Feldman   MRN: [id number] : 1975          Procedure : Procedure(s):  Robot assisted Total Laparoscopic Hysterectomy, Bilateral Salpingectomy,left ovarian cystectomy         Past Medical History:   Diagnosis Date    Acute pancreatitis, unspecified complication status, unspecified pancreatitis type 2025    Hypertension 2019      Past Surgical History:   Procedure Laterality Date    AS REMOVAL OF OVARIAN CYST(S) Left 2016    BIOPSY      Right Breast Biopsy    LAPAROSCOPIC CHOLECYSTECTOMY WITH CHOLANGIOGRAMS N/A 2025    Procedure: Laparoscopic cholecystectomy with cholangiopgram;  Surgeon: Yury Oswald MD;  Location: [facility] OR      No Known Allergies   Social History     Tobacco Use    Smoking status: Never    Smokeless tobacco: Never   Substance Use Topics    Alcohol use: Never      Wt Readings from Last 1 Encounters:   25 65.3 kg (143 lb 14.4 oz)        Anesthesia Evaluation            ROS/MED HX  ENT/Pulmonary:  - neg pulmonary ROS     Neurologic:  - neg neurologic ROS     Cardiovascular:     (+)  hypertension- -   -  - -                                      METS/Exercise Tolerance:     Hematologic:  - neg hematologic  ROS     Musculoskeletal:  - neg musculoskeletal ROS     GI/Hepatic:  - neg GI/hepatic ROS     Renal/Genitourinary:  - neg Renal ROS     Endo:  - neg endo ROS     Psychiatric/Substance Use:  - neg psychiatric ROS     Infectious Disease:       Malignancy:       Other:              Physical Exam  Airway  Mallampati: II  TM distance: >3 FB  Neck ROM: full  Mouth opening: >= 4 cm    Cardiovascular   Rhythm: regular  Rate: normal rate     Dental   (+) Minor Abnormalities - some fillings, tiny chips      Pulmonary - normal exam      Neurological - normal exam  She appears awake, alert and oriented x3.    Other Findings       OUTSIDE LABS:  CBC:   Lab Results   Component Value Date    WBC 9.8 2025    WBC 6.8  "01/05/2025    HGB 11.9 07/08/2025    HGB 11.7 01/27/2025    HCT 35.6 01/27/2025    HCT 33.6 (L) 01/05/2025     01/27/2025     01/05/2025     BMP:   Lab Results   Component Value Date     07/08/2025     01/27/2025    POTASSIUM 3.7 07/08/2025    POTASSIUM 3.6 01/27/2025    CHLORIDE 102 07/08/2025    CHLORIDE 100 01/27/2025    CO2 25 07/08/2025    CO2 26 01/27/2025    BUN 16.6 07/08/2025    BUN 8.8 01/27/2025    CR 0.52 07/08/2025    CR 0.48 (L) 01/27/2025    GLC 83 07/08/2025    GLC 90 01/27/2025     COAGS: No results found for: \"PTT\", \"INR\", \"FIBR\"  POC:   Lab Results   Component Value Date    HCG Negative 07/23/2025    HCGS Negative 01/03/2025     HEPATIC:   Lab Results   Component Value Date    ALBUMIN 4.3 01/27/2025    PROTTOTAL 7.6 01/27/2025    ALT 20 01/27/2025    AST 19 01/27/2025    ALKPHOS 104 01/27/2025    BILITOTAL 0.5 01/27/2025     OTHER:   Lab Results   Component Value Date    A1C 5.7 (H) 01/03/2025    JUAN 9.1 07/08/2025    LIPASE 40 01/05/2025    TSH 2.32 11/12/2024       Anesthesia Plan    ASA Status:  2      NPO Status: NPO Appropriate   Anesthesia Type: General.  Airway: oral.  Induction: intravenous.  Maintenance: Balanced.   Techniques and Equipment:       - Monitoring Plan: standard ASA monitoring     Consents    Anesthesia Plan(s) and associated risks, benefits, and realistic alternatives discussed. Questions answered and patient/representative(s) expressed understanding.     - Discussed: anesthesiologist     - Discussed with:  Patient        - Pt is DNR/DNI Status: no DNR     Blood Consent:      - Discussed with: not discussed.     Postoperative Care    Pain management: non-narcotic analgesics, plan for postoperative opioid use, multimodal analgesia.     Comments:    Other Comments:   Methadone 20 mg  Dexadron 8 mg  Zofran 4 mg  Toradol 15 mg  Precedex  Sugammadex if paralytic to be used                Junior Murguia MD      Clinically Significant Risk Factors " Present on Admission                   # Hypertension: Noted on problem list

## 2025-07-23 NOTE — OR NURSING
Pt ambulated with steady gait and SBA to br to void. Returned to room, set up in recliner. Pt had small emesis. Pt given essential oils. Reassess in 10-15 min to see if symptoms improve.

## 2025-07-23 NOTE — ANESTHESIA POSTPROCEDURE EVALUATION
Patient: Piyush Feldman    Procedure: Procedure(s):  Robot assisted Total Laparoscopic Hysterectomy, Bilateral Salpingectomy,left ovarian cystectomy       Anesthesia Type:  General    Note:     Postop Pain Control: Uneventful            Sign Out: Well controlled pain   PONV: No   Neuro/Psych: Uneventful            Sign Out: Acceptable/Baseline neuro status   Airway/Respiratory: Uneventful            Sign Out: Acceptable/Baseline resp. status   CV/Hemodynamics: Uneventful            Sign Out: Acceptable CV status   Other NRE: NONE   DID A NON-ROUTINE EVENT OCCUR? No           Last vitals:  Vitals Value Taken Time   /66 07/23/25 13:45   Temp 96.98  F (36.1  C) 07/23/25 13:52   Pulse 59 07/23/25 13:53   Resp 12 07/23/25 13:53   SpO2 100 % 07/23/25 13:53   Vitals shown include unfiled device data.    Electronically Signed By: Greg Elizabeth MD  July 23, 2025  1:54 PM

## 2025-07-23 NOTE — OR NURSING
Dr Stephens to bedside to assess pt for ongoing nausea. Awaiting new orders. Dr Mustafa (OB, Dr Rodríguez's partner) to bedside to assess and check in on pt. Pt ok to discharge home once sx's improved.

## 2025-07-23 NOTE — ANESTHESIA PROCEDURE NOTES
Airway       Patient location during procedure: OR       Procedure Start/Stop Times: 7/23/2025 10:25 AM  Staff -        Anesthesiologist:  Junior Murguia MD       CRNA: Carolin Rock APRN CRNA       Performed By: anesthesiologist  Consent for Airway        Urgency: elective  Indications and Patient Condition       Indications for airway management: bret-procedural       Induction type:intravenous       Mask difficulty assessment: 1 - vent by mask    Final Airway Details       Final airway type: endotracheal airway       Successful airway: ETT - single and Oral  Endotracheal Airway Details        ETT size (mm): 7.0       Cuffed: yes       Cuff volume (mL): 6       Successful intubation technique: direct laryngoscopy       DL Blade Type: MAC 3       Grade View of Cords: 3       Adjucts: stylet       Position: Right       Measured from: gums/teeth       Secured at (cm): 22       Bite block used: None    Post intubation assessment        Placement verified by: capnometry, equal breath sounds and chest rise        Number of attempts at approach: 2 (DL with MAC 3 attempted by CRNA, unsuccessful. MD successful DL with MAC 3, grade 3 view.)       Number of other approaches attempted: 0       Secured with: tape       Ease of procedure: easy       Dentition: Intact and Unchanged    Medication(s) Administered   Medication Administration Time: 7/23/2025 10:25 AM

## 2025-07-23 NOTE — DISCHARGE INSTRUCTIONS
Maximum acetaminophen (Tylenol) dose from all sources should not exceed 4 grams (4000 mg) per day. You last had 975 mg at 9am, your next dose is 3pm or later.    You received Toradol, an IV form of Ibuprofen (Motrin) at 10:20am.  Do not take any Ibuprofen products until 4:20pm.    DR. WALESKA STOVALL     [facility] PHONE NUMBER:  [phone]  [facility]

## 2025-07-23 NOTE — OP NOTE
Operative Note    Patient: Piyush Feldman  : 1975  MRN: [id number]    Date of Service: 2025    Pre-operative diagnosis:  Abnormal uterine bleeding  Left ovarian cyst  Fibroid uterus  Dysmenorrhea  Endometriosis     Post-operative diagnosis:  Same   Extensive scarring from endometriosis  Left hydrosalpinx  Appendix with small endometriosis implants     Procedure:   Robot assisted total laparoscopic hysterectomy  Bilateral salpingectomy  Lysis of adhesions   Decompression of left ovarian cyst  Cystoscopy     Surgeon: Ce Rodríguez MD  Assistant: Deborah Santos CST     Anesthesia: General    EBL: 50cc  Urine: 600cc  Fluids: 1200cc    Specimens: uterus, cervix, bilateral fallopian tubes.  Specimen weight 357g.   Complications: none apparent    Findings: stage 3 endometriosis with obliteration of the posterior cul de sac and a left hydrosalpinx, left ovarian endometrioma, right ovarian simple cysts, endometriosis on appendix    Indications: Piyush Feldman is a 49 year old female with a history of cystectomy of ovarian endometrioma with AUB and dysmenorrhea with known fibroids who failed conservative mgmt and presented for hysterectomy and decompression of a left ovarian cyst.  Discussed risks, benefits, and alternatives to the procedure including risk of infection, bleeding, damage to local organs, blood clots, possible compromise or loss of left ovary. The patient's questions were answered, understanding confirmed, and the patient signed written informed consent.    Procedure:     The patient was taken to the operating room where she underwent anesthesia and was prepped and draped in the usual sterile fashion in dorsal lithotomy position.  A time-out was performed.  A saldivar was inserted.  A speculum was inserted into the vagina and the [employer]in[employer]-care uterine manipulator was inserted. The speculum was removed.     Attention was then turned to the abdomen where penetrating towel clips were applied to  the lateral sides of the area 3 cm superior to the umbilicus.  Local anesthetic as infiltrated, and an 11-blade scalpel was used to make an incision at the base. The abdominal skin was tented and a Veress needle inserted into the abdomen. Syringe withdrawal and saline drip were normal, and opening pressure was < 5 mmHg.  Insufflation was then turned to high flow and once the intra-abdominal pressure reached 15 mmHg, the needle was removed.     The 8mm port was placed though the incision while the skin was tented manually. Visualization with the laparoscope showed adequate entry into the peritoneum without injury. Pneumoperitoneum was achieved with good tympany of the abdomen. The first robot port was marked in the LLQ, local anesthetic injected, a 5 mm horizontal skin incision made, and an 8mm port was placed under direct visualization within the abdomen. The second robot port was placed in similar fashion in the RLQ. An assistant port was placed in a similar fashion 8 cm medial and slightly superior to the right port. The patient was placed into steep Trendelenburg, and the robot was docked to the patient's left side. All instruments and the camera were placed into the abdomen through the ports, using the fenestrated bipolar in the left port and the monopolar scissors on the right.    A survey was done and very small endometriosis lesions were noted on the appendix.  General surgery was consulted and they looked at the lesions.  Determined that the appendix was not overly inflamed or diseased and the patient could defer definitive surgical mgmt of this until later, as it was not known prior to the procedure that this would be found.      Attention was turned to the left hydrosalpinx which was dissected including going down in to the posterior cul de sac which required 30 minutes of lysis of adhesions.  The left ovarian cyst was decompressed of chocolate cyst endometrioma fluid.  The left fallopian tube was  dissected down the mesosalpinx from the fimbriated end to the cornua using cautery.  The left round ligament was isolated and cauterized, and then transected.  The left utero ovarian ligament was cauterized and transected.   Then the vesicouterine peritoneum was incised and the bladder flap was developed at the upper vagina.  The right fallopian tube, utero ovarian ligament, and round ligament were then taken down in a similar manner.  The bilateral uterine arteries were isolated, cauterized with bipolar cautery, and transected.  The incision was then extended along the cardinal and uterosacral ligaments, keeping close to the cervix to the level of the upper vagina.  The V-Care cup was then palpated at the level of the upper vagina.  The upper vagina was incised in a circumferential manner with monopolar cautery, resecting the uterus and cervix.  The specimen was brought out with traction after bivalving the uterus with the monopolar cautery in order to fit it out, then was pulled out through the vaginal opening and was sent to pathology.      The vaginal balloon was inserted to obtain adequate insufflation.  The vaginal cuff was closed with a V-lock barbed suture, going from the right to the left and then back again in a second layer, enveloping the posterior peritoneum.  The pelvis was then irrigated and after cautery of areas that were bleeding as well as placement of surgicel powder, excellent hemostasis was observed.      At this point, the saldivar was removed and the cystoscope was inserted.  There was brisk efflux from bilateral ureteral orifices and with distension of the bladder, no evidence of bladder damage from thermal injury or suture noted from cystoscope or from above when viewed laparoscopically.  The cystoscope was removed and the bladder was drained.     The skin incisions were closed with 4-0 monocryl, and covered with a steri strip and bandage.  The vaginal balloon was removed, the saldivar was  removed, and the vaginal cuff observed from below was noted to be intact with no bleeding.    Instrument, needle, and sponge counts were correct times 2.  The patient was extubated in the OR and transferred to the PACU in stable condition.     The assistance of the co-surgeon was required due to the need for additional skilled surgical hands to retract and hold instruments at the bedside especially needing suction irrigation due to bleeding as well as manipulation and retraction with endometriosis, due to the nature of the surgical procedure and risk of complications.    Ce Rodríguez MD, MPH  [facility]      Uterus, left hydrosalpinx, fibroids     Left cyst, hydrosalpinx, adhesion posterior cul de sac    Appendix with small endometriosis lesions     Left tube and ovary, chocolate cyst     After

## 2025-07-29 ENCOUNTER — TELEPHONE (OUTPATIENT)
Dept: OBGYN | Facility: CLINIC | Age: 50
End: 2025-07-29
Payer: COMMERCIAL

## 2025-07-29 NOTE — TELEPHONE ENCOUNTER
Form received from: [employer]     Form requesting following info/need: Short term disability     TATIANA needed?: no     Location of form: above desk at      When completed the route for return: [phone].      Please call patient when done as she would like a copy of forms to  at clinic.

## 2025-08-16 ENCOUNTER — APPOINTMENT (OUTPATIENT)
Dept: CT IMAGING | Facility: CLINIC | Age: 50
End: 2025-08-16
Attending: RADIOLOGY
Payer: COMMERCIAL

## 2025-08-16 ENCOUNTER — APPOINTMENT (OUTPATIENT)
Dept: CT IMAGING | Facility: CLINIC | Age: 50
End: 2025-08-16
Attending: PHYSICIAN ASSISTANT
Payer: COMMERCIAL

## 2025-08-16 ENCOUNTER — HOSPITAL ENCOUNTER (INPATIENT)
Facility: CLINIC | Age: 50
LOS: 3 days | Discharge: HOME | DRG: 699 | End: 2025-08-19
Attending: EMERGENCY MEDICINE | Admitting: OBSTETRICS & GYNECOLOGY
Payer: COMMERCIAL

## 2025-08-16 PROBLEM — N73.9 ABSCESS OF FEMALE PELVIS: Status: ACTIVE | Noted: 2025-08-16

## 2025-08-16 PROBLEM — T81.49XA ABSCESS AFTER PROCEDURE: Status: ACTIVE | Noted: 2025-08-16

## 2025-08-16 ASSESSMENT — ACTIVITIES OF DAILY LIVING (ADL)
ADLS_ACUITY_SCORE: 25
ADLS_ACUITY_SCORE: 48
ADLS_ACUITY_SCORE: 48
ADLS_ACUITY_SCORE: 25
ADLS_ACUITY_SCORE: 48
ADLS_ACUITY_SCORE: 25
ADLS_ACUITY_SCORE: 48
ADLS_ACUITY_SCORE: 25
ADLS_ACUITY_SCORE: 48
ADLS_ACUITY_SCORE: 25
ADLS_ACUITY_SCORE: 25
ADLS_ACUITY_SCORE: 48

## 2025-08-16 ASSESSMENT — COLUMBIA-SUICIDE SEVERITY RATING SCALE - C-SSRS
2. HAVE YOU ACTUALLY HAD ANY THOUGHTS OF KILLING YOURSELF IN THE PAST MONTH?: NO
1. IN THE PAST MONTH, HAVE YOU WISHED YOU WERE DEAD OR WISHED YOU COULD GO TO SLEEP AND NOT WAKE UP?: NO
6. HAVE YOU EVER DONE ANYTHING, STARTED TO DO ANYTHING, OR PREPARED TO DO ANYTHING TO END YOUR LIFE?: NO

## 2025-08-17 ASSESSMENT — ACTIVITIES OF DAILY LIVING (ADL)
ADLS_ACUITY_SCORE: 25

## 2025-08-18 ENCOUNTER — APPOINTMENT (OUTPATIENT)
Dept: INTERVENTIONAL RADIOLOGY/VASCULAR | Facility: CLINIC | Age: 50
End: 2025-08-18
Attending: RADIOLOGY
Payer: COMMERCIAL

## 2025-08-18 ASSESSMENT — ACTIVITIES OF DAILY LIVING (ADL)
ADLS_ACUITY_SCORE: 25

## 2025-08-19 ASSESSMENT — ACTIVITIES OF DAILY LIVING (ADL)
ADLS_ACUITY_SCORE: 25

## 2025-08-21 ENCOUNTER — PATIENT OUTREACH (OUTPATIENT)
Dept: CARE COORDINATION | Facility: CLINIC | Age: 50
End: 2025-08-21
Payer: COMMERCIAL

## 2025-08-22 ENCOUNTER — HOSPITAL ENCOUNTER (OUTPATIENT)
Dept: CT IMAGING | Facility: CLINIC | Age: 50
Discharge: HOME | End: 2025-08-22
Attending: OBSTETRICS & GYNECOLOGY | Admitting: OBSTETRICS & GYNECOLOGY
Payer: COMMERCIAL

## 2025-08-22 DIAGNOSIS — R50.82 POSTSURGICAL FEVER: ICD-10-CM

## 2025-08-22 PROCEDURE — 74177 CT ABD & PELVIS W/CONTRAST: CPT

## 2025-08-22 PROCEDURE — 255N000002 HC RX 255 OP 636: Performed by: OBSTETRICS & GYNECOLOGY

## 2025-08-22 PROCEDURE — 250N000009 HC RX 250: Performed by: OBSTETRICS & GYNECOLOGY

## 2025-08-22 RX ADMIN — SODIUM CHLORIDE 62 ML: 9 INJECTION, SOLUTION INTRAVENOUS at 16:02

## 2025-08-22 RX ADMIN — IOHEXOL 77 ML: 350 INJECTION, SOLUTION INTRAVENOUS at 16:02

## 2025-08-25 ENCOUNTER — TELEPHONE (OUTPATIENT)
Dept: OTHER | Facility: CLINIC | Age: 50
End: 2025-08-25
Payer: COMMERCIAL

## 2025-09-03 ENCOUNTER — HOSPITAL ENCOUNTER (OUTPATIENT)
Facility: CLINIC | Age: 50
Discharge: HOME | End: 2025-09-03
Admitting: RADIOLOGY
Payer: COMMERCIAL

## 2025-09-03 ENCOUNTER — TELEPHONE (OUTPATIENT)
Dept: INTERVENTIONAL RADIOLOGY/VASCULAR | Facility: CLINIC | Age: 50
End: 2025-09-03
Payer: COMMERCIAL

## 2025-09-03 DIAGNOSIS — T81.49XA ABSCESS AFTER PROCEDURE: Primary | ICD-10-CM

## 2025-09-03 ASSESSMENT — ACTIVITIES OF DAILY LIVING (ADL)
ADLS_ACUITY_SCORE: 48

## (undated) DEVICE — ESU CORD MONOPOLAR 10'  E0510

## (undated) DEVICE — LUBRICANT INST ELECTROLUBE EL101

## (undated) DEVICE — UNDERPAD 36X30 PREMIERPRO MAX ABS NS LF 676111

## (undated) DEVICE — LINEN HALF SHEET 5512

## (undated) DEVICE — GLOVE BIOGEL PI MICRO SZ 8.0 48580

## (undated) DEVICE — SOLUTION IV 0.9% NACL 1000ML E8000

## (undated) DEVICE — SUCTION IRR STRYKERFLOW II W/TIP 250-070-520

## (undated) DEVICE — CATH CHOLANGIOGRAM 4.5FR TAUT METAL TIP 20018-M55

## (undated) DEVICE — PREP DYNA-HEX 4% CHG SCRUB 4OZ BOTTLE MDS098710

## (undated) DEVICE — DRAPE C-ARM 60X42" 1013

## (undated) DEVICE — ENDO POUCH UNIV RETRIEVAL SYSTEM INZII 10MM CD001

## (undated) DEVICE — LINEN ORTHO ACL PACK 5447

## (undated) DEVICE — ENDO TROCAR CONMED AIRSEAL BLADELESS 05X120MM IAS5-120LP

## (undated) DEVICE — Device

## (undated) DEVICE — GLOVE PROTEXIS BLUE W/NEU-THERA 7.0  2D73EB70

## (undated) DEVICE — ESU GROUND PAD ADULT W/CORD E7507

## (undated) DEVICE — SOL NACL 0.9% IRRIG 3000ML BAG 2B7477

## (undated) DEVICE — TUBING IV EXTENSION SET ANESTHESIA 34" MLL 2C6227

## (undated) DEVICE — SYSTEM PSTN 29X20X1IN PNK PD LF NS 40580 (COI)

## (undated) DEVICE — DAVINCI XI SEAL UNIVERSAL 5-12MM 470500

## (undated) DEVICE — SU VICRYL+ 0 27IN CT-2 UND VCP270H

## (undated) DEVICE — SUCTION MANIFOLD NEPTUNE 2 SYS 4 PORT 0702-020-000

## (undated) DEVICE — ENDO TROCAR SLEEVE KII Z-THREADED 05X100MM CTS02

## (undated) DEVICE — DAVINCI XI OBTURATOR BLADELESS 8MM 470359

## (undated) DEVICE — SURGICEL POWDER ABSORBABLE HEMOSTAT 3GM 3013SP

## (undated) DEVICE — SU WND CLOSURE VLOC 180 ABS 0 9" GS-21 VLOCL0346

## (undated) DEVICE — LINEN TOWEL PACK X5 5464

## (undated) DEVICE — PACK DAVINCI GYN SMA15GDFS1

## (undated) DEVICE — ENDO TROCAR OPTICAL ACCESS KII Z-THRD 12X100MM C0R85

## (undated) DEVICE — SYR 30ML LL W/O NDL 302832

## (undated) DEVICE — SOLUTION WATER 1000ML BOTTLE R5000-01

## (undated) DEVICE — ESU ELEC BLADE 2.75" COATED/INSULATED E1455

## (undated) DEVICE — GLOVE BIOGEL PI MICRO SZ 7.5 48575

## (undated) DEVICE — DRAPE LEGGINGS 8421

## (undated) DEVICE — BAG CLEAR TRASH 1.3M 39X33" P4040C

## (undated) DEVICE — CLIP APPLIER ENDO 5MM M/L LIGAMAX EL5ML

## (undated) DEVICE — SOL NACL 0.9% INJ 250ML BAG 2B1322Q

## (undated) DEVICE — ANTIFOG SOLUTION SEE SHARP 150M TROCAR SWABS 30978 (COI)

## (undated) DEVICE — DAVINCI XI DRAPE ARM 470015

## (undated) DEVICE — LINEN POUCH DBL 5427

## (undated) DEVICE — SYR 10ML LL W/O NDL 302995

## (undated) DEVICE — ESU PENCIL W/HOLSTER E2350H

## (undated) DEVICE — LINEN FULL SHEET 5511

## (undated) DEVICE — PROTECTOR ARM ONE-STEP TRENDELENBURG 40418 (COI)

## (undated) DEVICE — SU MONOCRYL 4-0 PS-2 27" UND Y426H

## (undated) DEVICE — CATH IV ANGIO INTRO 12GA 382277

## (undated) DEVICE — TUBING FILTER TRI-LUMEN AIRSEAL ASC-EVAC1

## (undated) DEVICE — RETR ELEV / UTERINE MANIPULATOR V-CARE XLG CUP 60-6085-203A

## (undated) DEVICE — SU VICRYL 4-0 P-3 18" UND J494G

## (undated) DEVICE — APPLICATOR ENDOSCOPIC 5 SURGICEL POWDER 3123SPEA

## (undated) DEVICE — LINEN TOWEL PACK X10 5473

## (undated) DEVICE — DECANTER BAG 2002S

## (undated) DEVICE — SUTURE VICRYL+ 0 CT-2 18" VCP727H

## (undated) DEVICE — CONNECTOR STOPCOCK 3 WAY MALE LL HI-FLO MX9311L

## (undated) DEVICE — CONTRAST ISOVUE 300 61% IOPAMIDOL 10X30ML VIAL 131525

## (undated) DEVICE — DAVINCI HOT SHEARS TIP COVER  400180

## (undated) DEVICE — ENDO TROCAR FIRST ENTRY KII FIOS Z-THRD 05X100MM CTF03

## (undated) RX ORDER — INDOCYANINE GREEN AND WATER 25 MG
KIT INJECTION
Status: DISPENSED
Start: 2025-01-05

## (undated) RX ORDER — METRONIDAZOLE 500 MG/100ML
INJECTION, SOLUTION INTRAVENOUS
Status: DISPENSED
Start: 2025-07-23

## (undated) RX ORDER — FENTANYL CITRATE 50 UG/ML
INJECTION, SOLUTION INTRAMUSCULAR; INTRAVENOUS
Status: DISPENSED
Start: 2025-01-05

## (undated) RX ORDER — ONDANSETRON 2 MG/ML
INJECTION INTRAMUSCULAR; INTRAVENOUS
Status: DISPENSED
Start: 2025-07-23

## (undated) RX ORDER — KETOROLAC TROMETHAMINE 30 MG/ML
INJECTION, SOLUTION INTRAMUSCULAR; INTRAVENOUS
Status: DISPENSED
Start: 2025-01-05

## (undated) RX ORDER — ONDANSETRON 2 MG/ML
INJECTION INTRAMUSCULAR; INTRAVENOUS
Status: DISPENSED
Start: 2025-01-05

## (undated) RX ORDER — BUPIVACAINE HYDROCHLORIDE AND EPINEPHRINE 5; 5 MG/ML; UG/ML
INJECTION, SOLUTION EPIDURAL; INTRACAUDAL; PERINEURAL
Status: DISPENSED
Start: 2025-01-05

## (undated) RX ORDER — CEFAZOLIN SODIUM/WATER 2 G/20 ML
SYRINGE (ML) INTRAVENOUS
Status: DISPENSED
Start: 2025-01-05

## (undated) RX ORDER — CEFAZOLIN SODIUM/WATER 2 G/20 ML
SYRINGE (ML) INTRAVENOUS
Status: DISPENSED
Start: 2025-07-23

## (undated) RX ORDER — ACETAMINOPHEN 325 MG/1
TABLET ORAL
Status: DISPENSED
Start: 2025-07-23

## (undated) RX ORDER — METHADONE HYDROCHLORIDE 10 MG/ML
INJECTION, SOLUTION INTRAMUSCULAR; INTRAVENOUS; SUBCUTANEOUS
Status: DISPENSED
Start: 2025-07-23

## (undated) RX ORDER — GLYCOPYRROLATE 0.2 MG/ML
INJECTION, SOLUTION INTRAMUSCULAR; INTRAVENOUS
Status: DISPENSED
Start: 2025-07-23

## (undated) RX ORDER — ACETAMINOPHEN 325 MG/1
TABLET ORAL
Status: DISPENSED
Start: 2025-01-05

## (undated) RX ORDER — GLYCOPYRROLATE 0.2 MG/ML
INJECTION, SOLUTION INTRAMUSCULAR; INTRAVENOUS
Status: DISPENSED
Start: 2025-01-05

## (undated) RX ORDER — PHENAZOPYRIDINE HYDROCHLORIDE 200 MG/1
TABLET, FILM COATED ORAL
Status: DISPENSED
Start: 2025-07-23

## (undated) RX ORDER — BUPIVACAINE HYDROCHLORIDE AND EPINEPHRINE 5; 5 MG/ML; UG/ML
INJECTION, SOLUTION EPIDURAL; INTRACAUDAL; PERINEURAL
Status: DISPENSED
Start: 2025-07-23

## (undated) RX ORDER — SCOPOLAMINE 1 MG/3D
PATCH, EXTENDED RELEASE TRANSDERMAL
Status: DISPENSED
Start: 2025-07-23